# Patient Record
Sex: FEMALE | Race: WHITE | NOT HISPANIC OR LATINO | Employment: OTHER | URBAN - METROPOLITAN AREA
[De-identification: names, ages, dates, MRNs, and addresses within clinical notes are randomized per-mention and may not be internally consistent; named-entity substitution may affect disease eponyms.]

---

## 2023-03-23 RX ORDER — SPIRONOLACTONE 25 MG/1
25 TABLET ORAL DAILY
Status: ON HOLD | COMMUNITY

## 2023-03-23 RX ORDER — SIMVASTATIN 20 MG
20 TABLET ORAL AT BEDTIME
Status: ON HOLD | COMMUNITY

## 2023-03-23 RX ORDER — LATANOPROST 50 UG/ML
1 SOLUTION/ DROPS OPHTHALMIC AT BEDTIME
Status: ON HOLD | COMMUNITY

## 2023-03-28 ENCOUNTER — TRANSFERRED RECORDS (OUTPATIENT)
Dept: HEALTH INFORMATION MANAGEMENT | Facility: CLINIC | Age: 78
End: 2023-03-28
Payer: COMMERCIAL

## 2023-03-31 ENCOUNTER — ANESTHESIA (OUTPATIENT)
Dept: SURGERY | Facility: CLINIC | Age: 78
End: 2023-03-31
Payer: COMMERCIAL

## 2023-03-31 ENCOUNTER — ANESTHESIA EVENT (OUTPATIENT)
Dept: SURGERY | Facility: CLINIC | Age: 78
End: 2023-03-31
Payer: COMMERCIAL

## 2023-03-31 ENCOUNTER — HOSPITAL ENCOUNTER (OUTPATIENT)
Facility: CLINIC | Age: 78
Discharge: HOME | End: 2023-04-01
Attending: OBSTETRICS & GYNECOLOGY | Admitting: OBSTETRICS & GYNECOLOGY
Payer: COMMERCIAL

## 2023-03-31 DIAGNOSIS — Z98.890 POSTOPERATIVE STATE: Primary | ICD-10-CM

## 2023-03-31 LAB
ANION GAP SERPL CALCULATED.3IONS-SCNC: 19 MMOL/L (ref 7–15)
BUN SERPL-MCNC: 15.2 MG/DL (ref 8–23)
CALCIUM SERPL-MCNC: 9.2 MG/DL (ref 8.8–10.2)
CHLORIDE SERPL-SCNC: 97 MMOL/L (ref 98–107)
CREAT SERPL-MCNC: 0.73 MG/DL (ref 0.51–0.95)
DEPRECATED HCO3 PLAS-SCNC: 19 MMOL/L (ref 22–29)
GFR SERPL CREATININE-BSD FRML MDRD: 85 ML/MIN/1.73M2
GLUCOSE SERPL-MCNC: 92 MG/DL (ref 70–99)
HGB BLD-MCNC: 14.6 G/DL (ref 11.7–15.7)
POTASSIUM SERPL-SCNC: 4.5 MMOL/L (ref 3.4–5.3)
SODIUM SERPL-SCNC: 135 MMOL/L (ref 136–145)

## 2023-03-31 PROCEDURE — C1781 MESH (IMPLANTABLE): HCPCS | Performed by: OBSTETRICS & GYNECOLOGY

## 2023-03-31 PROCEDURE — 250N000011 HC RX IP 250 OP 636: Performed by: OBSTETRICS & GYNECOLOGY

## 2023-03-31 PROCEDURE — 258N000003 HC RX IP 258 OP 636: Performed by: NURSE ANESTHETIST, CERTIFIED REGISTERED

## 2023-03-31 PROCEDURE — C1771 REP DEV, URINARY, W/SLING: HCPCS | Performed by: OBSTETRICS & GYNECOLOGY

## 2023-03-31 PROCEDURE — 85018 HEMOGLOBIN: CPT | Performed by: OBSTETRICS & GYNECOLOGY

## 2023-03-31 PROCEDURE — 250N000009 HC RX 250: Performed by: NURSE ANESTHETIST, CERTIFIED REGISTERED

## 2023-03-31 PROCEDURE — 999N000141 HC STATISTIC PRE-PROCEDURE NURSING ASSESSMENT: Performed by: OBSTETRICS & GYNECOLOGY

## 2023-03-31 PROCEDURE — 250N000011 HC RX IP 250 OP 636: Performed by: NURSE ANESTHETIST, CERTIFIED REGISTERED

## 2023-03-31 PROCEDURE — 258N000003 HC RX IP 258 OP 636: Performed by: ANESTHESIOLOGY

## 2023-03-31 PROCEDURE — 250N000025 HC SEVOFLURANE, PER MIN: Performed by: OBSTETRICS & GYNECOLOGY

## 2023-03-31 PROCEDURE — 250N000009 HC RX 250: Performed by: OBSTETRICS & GYNECOLOGY

## 2023-03-31 PROCEDURE — 370N000017 HC ANESTHESIA TECHNICAL FEE, PER MIN: Performed by: OBSTETRICS & GYNECOLOGY

## 2023-03-31 PROCEDURE — 710N000009 HC RECOVERY PHASE 1, LEVEL 1, PER MIN: Performed by: OBSTETRICS & GYNECOLOGY

## 2023-03-31 PROCEDURE — 272N000001 HC OR GENERAL SUPPLY STERILE: Performed by: OBSTETRICS & GYNECOLOGY

## 2023-03-31 PROCEDURE — 80048 BASIC METABOLIC PNL TOTAL CA: CPT | Performed by: ANESTHESIOLOGY

## 2023-03-31 PROCEDURE — 88305 TISSUE EXAM BY PATHOLOGIST: CPT | Mod: TC | Performed by: OBSTETRICS & GYNECOLOGY

## 2023-03-31 PROCEDURE — 258N000003 HC RX IP 258 OP 636: Performed by: OBSTETRICS & GYNECOLOGY

## 2023-03-31 PROCEDURE — 88305 TISSUE EXAM BY PATHOLOGIST: CPT | Mod: 26 | Performed by: STUDENT IN AN ORGANIZED HEALTH CARE EDUCATION/TRAINING PROGRAM

## 2023-03-31 PROCEDURE — 360N000080 HC SURGERY LEVEL 7, PER MIN: Performed by: OBSTETRICS & GYNECOLOGY

## 2023-03-31 PROCEDURE — 250N000013 HC RX MED GY IP 250 OP 250 PS 637: Performed by: OBSTETRICS & GYNECOLOGY

## 2023-03-31 DEVICE — IMPLANTABLE DEVICE: Type: IMPLANTABLE DEVICE | Site: PELVIS | Status: FUNCTIONAL

## 2023-03-31 DEVICE — TRANSVAGINAL MID-URETHRAL SLING
Type: IMPLANTABLE DEVICE | Site: PELVIS | Status: FUNCTIONAL
Brand: ADVANTAGE FIT™  SYSTEM

## 2023-03-31 RX ORDER — POLYETHYLENE GLYCOL 3350 17 G/17G
17 POWDER, FOR SOLUTION ORAL DAILY
Status: DISCONTINUED | OUTPATIENT
Start: 2023-03-31 | End: 2023-04-01 | Stop reason: HOSPADM

## 2023-03-31 RX ORDER — CEFAZOLIN SODIUM/WATER 2 G/20 ML
2 SYRINGE (ML) INTRAVENOUS SEE ADMIN INSTRUCTIONS
Status: DISCONTINUED | OUTPATIENT
Start: 2023-03-31 | End: 2023-03-31 | Stop reason: HOSPADM

## 2023-03-31 RX ORDER — CEFAZOLIN SODIUM/WATER 2 G/20 ML
2 SYRINGE (ML) INTRAVENOUS
Status: COMPLETED | OUTPATIENT
Start: 2023-03-31 | End: 2023-03-31

## 2023-03-31 RX ORDER — HYDROMORPHONE HYDROCHLORIDE 2 MG/1
2 TABLET ORAL EVERY 4 HOURS PRN
Status: DISCONTINUED | OUTPATIENT
Start: 2023-03-31 | End: 2023-04-01 | Stop reason: HOSPADM

## 2023-03-31 RX ORDER — ONDANSETRON 4 MG/1
4 TABLET, ORALLY DISINTEGRATING ORAL EVERY 6 HOURS PRN
Status: DISCONTINUED | OUTPATIENT
Start: 2023-03-31 | End: 2023-04-01 | Stop reason: HOSPADM

## 2023-03-31 RX ORDER — LABETALOL HYDROCHLORIDE 5 MG/ML
10 INJECTION, SOLUTION INTRAVENOUS
Status: DISCONTINUED | OUTPATIENT
Start: 2023-03-31 | End: 2023-03-31 | Stop reason: HOSPADM

## 2023-03-31 RX ORDER — PHENAZOPYRIDINE HYDROCHLORIDE 200 MG/1
200 TABLET, FILM COATED ORAL ONCE
Status: COMPLETED | OUTPATIENT
Start: 2023-03-31 | End: 2023-03-31

## 2023-03-31 RX ORDER — ACETAMINOPHEN 325 MG/1
650 TABLET ORAL EVERY 6 HOURS
Status: DISCONTINUED | OUTPATIENT
Start: 2023-03-31 | End: 2023-04-01 | Stop reason: HOSPADM

## 2023-03-31 RX ORDER — HYDROMORPHONE HCL IN WATER/PF 6 MG/30 ML
0.4 PATIENT CONTROLLED ANALGESIA SYRINGE INTRAVENOUS
Status: DISCONTINUED | OUTPATIENT
Start: 2023-03-31 | End: 2023-04-01 | Stop reason: HOSPADM

## 2023-03-31 RX ORDER — PROCHLORPERAZINE MALEATE 5 MG
5 TABLET ORAL EVERY 6 HOURS PRN
Status: DISCONTINUED | OUTPATIENT
Start: 2023-03-31 | End: 2023-04-01 | Stop reason: HOSPADM

## 2023-03-31 RX ORDER — PROPOFOL 10 MG/ML
INJECTION, EMULSION INTRAVENOUS CONTINUOUS PRN
Status: DISCONTINUED | OUTPATIENT
Start: 2023-03-31 | End: 2023-03-31

## 2023-03-31 RX ORDER — NALOXONE HYDROCHLORIDE 0.4 MG/ML
0.4 INJECTION, SOLUTION INTRAMUSCULAR; INTRAVENOUS; SUBCUTANEOUS
Status: DISCONTINUED | OUTPATIENT
Start: 2023-03-31 | End: 2023-04-01 | Stop reason: HOSPADM

## 2023-03-31 RX ORDER — PROPOFOL 10 MG/ML
INJECTION, EMULSION INTRAVENOUS PRN
Status: DISCONTINUED | OUTPATIENT
Start: 2023-03-31 | End: 2023-03-31

## 2023-03-31 RX ORDER — HYDROMORPHONE HCL IN WATER/PF 6 MG/30 ML
0.4 PATIENT CONTROLLED ANALGESIA SYRINGE INTRAVENOUS EVERY 5 MIN PRN
Status: DISCONTINUED | OUTPATIENT
Start: 2023-03-31 | End: 2023-03-31 | Stop reason: HOSPADM

## 2023-03-31 RX ORDER — HYDROMORPHONE HYDROCHLORIDE 2 MG/1
4 TABLET ORAL EVERY 4 HOURS PRN
Status: DISCONTINUED | OUTPATIENT
Start: 2023-03-31 | End: 2023-04-01 | Stop reason: HOSPADM

## 2023-03-31 RX ORDER — ONDANSETRON 2 MG/ML
4 INJECTION INTRAMUSCULAR; INTRAVENOUS EVERY 6 HOURS PRN
Status: DISCONTINUED | OUTPATIENT
Start: 2023-03-31 | End: 2023-04-01 | Stop reason: HOSPADM

## 2023-03-31 RX ORDER — ONDANSETRON 4 MG/1
4 TABLET, ORALLY DISINTEGRATING ORAL EVERY 30 MIN PRN
Status: DISCONTINUED | OUTPATIENT
Start: 2023-03-31 | End: 2023-03-31 | Stop reason: HOSPADM

## 2023-03-31 RX ORDER — ONDANSETRON 2 MG/ML
INJECTION INTRAMUSCULAR; INTRAVENOUS PRN
Status: DISCONTINUED | OUTPATIENT
Start: 2023-03-31 | End: 2023-03-31

## 2023-03-31 RX ORDER — DEXAMETHASONE SODIUM PHOSPHATE 4 MG/ML
INJECTION, SOLUTION INTRA-ARTICULAR; INTRALESIONAL; INTRAMUSCULAR; INTRAVENOUS; SOFT TISSUE PRN
Status: DISCONTINUED | OUTPATIENT
Start: 2023-03-31 | End: 2023-03-31

## 2023-03-31 RX ORDER — FENTANYL CITRATE 50 UG/ML
50 INJECTION, SOLUTION INTRAMUSCULAR; INTRAVENOUS EVERY 5 MIN PRN
Status: DISCONTINUED | OUTPATIENT
Start: 2023-03-31 | End: 2023-03-31 | Stop reason: HOSPADM

## 2023-03-31 RX ORDER — HYDROMORPHONE HCL IN WATER/PF 6 MG/30 ML
0.2 PATIENT CONTROLLED ANALGESIA SYRINGE INTRAVENOUS EVERY 5 MIN PRN
Status: DISCONTINUED | OUTPATIENT
Start: 2023-03-31 | End: 2023-03-31 | Stop reason: HOSPADM

## 2023-03-31 RX ORDER — SODIUM CHLORIDE, SODIUM LACTATE, POTASSIUM CHLORIDE, CALCIUM CHLORIDE 600; 310; 30; 20 MG/100ML; MG/100ML; MG/100ML; MG/100ML
INJECTION, SOLUTION INTRAVENOUS CONTINUOUS
Status: DISCONTINUED | OUTPATIENT
Start: 2023-03-31 | End: 2023-03-31 | Stop reason: HOSPADM

## 2023-03-31 RX ORDER — KETOROLAC TROMETHAMINE 15 MG/ML
15 INJECTION, SOLUTION INTRAMUSCULAR; INTRAVENOUS EVERY 6 HOURS
Status: COMPLETED | OUTPATIENT
Start: 2023-03-31 | End: 2023-04-01

## 2023-03-31 RX ORDER — HYDROMORPHONE HCL IN WATER/PF 6 MG/30 ML
0.2 PATIENT CONTROLLED ANALGESIA SYRINGE INTRAVENOUS
Status: DISCONTINUED | OUTPATIENT
Start: 2023-03-31 | End: 2023-04-01 | Stop reason: HOSPADM

## 2023-03-31 RX ORDER — NALOXONE HYDROCHLORIDE 0.4 MG/ML
0.2 INJECTION, SOLUTION INTRAMUSCULAR; INTRAVENOUS; SUBCUTANEOUS
Status: DISCONTINUED | OUTPATIENT
Start: 2023-03-31 | End: 2023-04-01 | Stop reason: HOSPADM

## 2023-03-31 RX ORDER — FENTANYL CITRATE 50 UG/ML
INJECTION, SOLUTION INTRAMUSCULAR; INTRAVENOUS PRN
Status: DISCONTINUED | OUTPATIENT
Start: 2023-03-31 | End: 2023-03-31

## 2023-03-31 RX ORDER — FENTANYL CITRATE 50 UG/ML
25 INJECTION, SOLUTION INTRAMUSCULAR; INTRAVENOUS EVERY 5 MIN PRN
Status: DISCONTINUED | OUTPATIENT
Start: 2023-03-31 | End: 2023-03-31 | Stop reason: HOSPADM

## 2023-03-31 RX ORDER — ACETAMINOPHEN 325 MG/1
975 TABLET ORAL ONCE
Status: COMPLETED | OUTPATIENT
Start: 2023-03-31 | End: 2023-03-31

## 2023-03-31 RX ORDER — LIDOCAINE HYDROCHLORIDE 20 MG/ML
INJECTION, SOLUTION INFILTRATION; PERINEURAL PRN
Status: DISCONTINUED | OUTPATIENT
Start: 2023-03-31 | End: 2023-03-31

## 2023-03-31 RX ORDER — LIDOCAINE 40 MG/G
CREAM TOPICAL
Status: DISCONTINUED | OUTPATIENT
Start: 2023-03-31 | End: 2023-03-31 | Stop reason: HOSPADM

## 2023-03-31 RX ORDER — SODIUM CHLORIDE 9 MG/ML
INJECTION, SOLUTION INTRAVENOUS CONTINUOUS
Status: DISCONTINUED | OUTPATIENT
Start: 2023-03-31 | End: 2023-04-01 | Stop reason: HOSPADM

## 2023-03-31 RX ORDER — ONDANSETRON 2 MG/ML
4 INJECTION INTRAMUSCULAR; INTRAVENOUS EVERY 30 MIN PRN
Status: DISCONTINUED | OUTPATIENT
Start: 2023-03-31 | End: 2023-03-31 | Stop reason: HOSPADM

## 2023-03-31 RX ORDER — PROCHLORPERAZINE 25 MG
12.5 SUPPOSITORY, RECTAL RECTAL EVERY 12 HOURS PRN
Status: DISCONTINUED | OUTPATIENT
Start: 2023-03-31 | End: 2023-04-01 | Stop reason: HOSPADM

## 2023-03-31 RX ADMIN — PHENAZOPYRIDINE 200 MG: 200 TABLET ORAL at 10:54

## 2023-03-31 RX ADMIN — SUGAMMADEX 350 MG: 100 INJECTION, SOLUTION INTRAVENOUS at 15:18

## 2023-03-31 RX ADMIN — Medication 2 G: at 11:30

## 2023-03-31 RX ADMIN — ROCURONIUM BROMIDE 10 MG: 50 INJECTION, SOLUTION INTRAVENOUS at 14:04

## 2023-03-31 RX ADMIN — HYDROMORPHONE HYDROCHLORIDE 0.5 MG: 1 INJECTION, SOLUTION INTRAMUSCULAR; INTRAVENOUS; SUBCUTANEOUS at 13:37

## 2023-03-31 RX ADMIN — ACETAMINOPHEN 650 MG: 325 TABLET, FILM COATED ORAL at 18:36

## 2023-03-31 RX ADMIN — KETOROLAC TROMETHAMINE 15 MG: 15 INJECTION, SOLUTION INTRAMUSCULAR; INTRAVENOUS at 18:36

## 2023-03-31 RX ADMIN — PHENYLEPHRINE HYDROCHLORIDE 50 MCG: 10 INJECTION INTRAVENOUS at 12:03

## 2023-03-31 RX ADMIN — HYDROMORPHONE HYDROCHLORIDE 0.5 MG: 1 INJECTION, SOLUTION INTRAMUSCULAR; INTRAVENOUS; SUBCUTANEOUS at 12:04

## 2023-03-31 RX ADMIN — PROPOFOL 50 MCG/KG/MIN: 10 INJECTION, EMULSION INTRAVENOUS at 11:46

## 2023-03-31 RX ADMIN — POLYETHYLENE GLYCOL 3350 17 G: 17 POWDER, FOR SOLUTION ORAL at 18:36

## 2023-03-31 RX ADMIN — PROPOFOL 150 MG: 10 INJECTION, EMULSION INTRAVENOUS at 11:38

## 2023-03-31 RX ADMIN — TAZOBACTAM SODIUM AND PIPERACILLIN SODIUM 3.38 G: 375; 3 INJECTION, SOLUTION INTRAVENOUS at 20:20

## 2023-03-31 RX ADMIN — FENTANYL CITRATE 100 MCG: 50 INJECTION, SOLUTION INTRAMUSCULAR; INTRAVENOUS at 11:38

## 2023-03-31 RX ADMIN — ROCURONIUM BROMIDE 50 MG: 50 INJECTION, SOLUTION INTRAVENOUS at 11:38

## 2023-03-31 RX ADMIN — DEXAMETHASONE SODIUM PHOSPHATE 4 MG: 4 INJECTION, SOLUTION INTRA-ARTICULAR; INTRALESIONAL; INTRAMUSCULAR; INTRAVENOUS; SOFT TISSUE at 11:38

## 2023-03-31 RX ADMIN — SODIUM CHLORIDE, POTASSIUM CHLORIDE, SODIUM LACTATE AND CALCIUM CHLORIDE: 600; 310; 30; 20 INJECTION, SOLUTION INTRAVENOUS at 10:54

## 2023-03-31 RX ADMIN — LIDOCAINE HYDROCHLORIDE 30 MG: 20 INJECTION, SOLUTION INFILTRATION; PERINEURAL at 11:38

## 2023-03-31 RX ADMIN — SODIUM CHLORIDE: 9 INJECTION, SOLUTION INTRAVENOUS at 20:19

## 2023-03-31 RX ADMIN — HYDROMORPHONE HYDROCHLORIDE 0.5 MG: 1 INJECTION, SOLUTION INTRAMUSCULAR; INTRAVENOUS; SUBCUTANEOUS at 11:56

## 2023-03-31 RX ADMIN — HYDROMORPHONE HYDROCHLORIDE 0.5 MG: 1 INJECTION, SOLUTION INTRAMUSCULAR; INTRAVENOUS; SUBCUTANEOUS at 13:27

## 2023-03-31 RX ADMIN — ACETAMINOPHEN 975 MG: 325 TABLET ORAL at 10:54

## 2023-03-31 RX ADMIN — SODIUM CHLORIDE, POTASSIUM CHLORIDE, SODIUM LACTATE AND CALCIUM CHLORIDE: 600; 310; 30; 20 INJECTION, SOLUTION INTRAVENOUS at 12:22

## 2023-03-31 RX ADMIN — ROCURONIUM BROMIDE 10 MG: 50 INJECTION, SOLUTION INTRAVENOUS at 12:37

## 2023-03-31 RX ADMIN — ONDANSETRON 4 MG: 2 INJECTION INTRAMUSCULAR; INTRAVENOUS at 15:24

## 2023-03-31 ASSESSMENT — ACTIVITIES OF DAILY LIVING (ADL)
ADLS_ACUITY_SCORE: 24
ADLS_ACUITY_SCORE: 24
ADLS_ACUITY_SCORE: 20
ADLS_ACUITY_SCORE: 24

## 2023-03-31 ASSESSMENT — ENCOUNTER SYMPTOMS: DYSRHYTHMIAS: 0

## 2023-03-31 NOTE — ANESTHESIA CARE TRANSFER NOTE
Patient: Connie Greenwood    Procedure: Procedure(s):  Xi Robotic Assisted Laparoscopic Sacral Colpopexy, Robotic Laparoscopic Supracervical Hysterectomy with Bilateral Salpingectomy-Oopherectomy, Lysis of Adhesions,  Robotic Laparoscopic Abdominal Enterocele Repair, Cystoscopy, and Midurethral Sling       Diagnosis: Uterovaginal prolapse, incomplete [N81.2]  Enterocele [K46.9]  Female stress incontinence [N39.3]  Diagnosis Additional Information: No value filed.    Anesthesia Type:   General     Note:    Oropharynx: oral airway in place  Level of Consciousness: drowsy and awake  Oxygen Supplementation: face mask  Level of Supplemental Oxygen (L/min / FiO2): 6  Independent Airway: airway patency satisfactory and stable  Dentition: dentition unchanged  Vital Signs Stable: post-procedure vital signs reviewed and stable  Report to RN Given: handoff report given  Patient transferred to: PACU    Handoff Report: Identifed the Patient, Identified the Reponsible Provider, Reviewed the pertinent medical history, Discussed the surgical course, Reviewed Intra-OP anesthesia mangement and issues during anesthesia, Set expectations for post-procedure period and Allowed opportunity for questions and acknowledgement of understanding      Vitals:  Vitals Value Taken Time   /75 03/31/23 1535   Temp 37    Pulse 72 03/31/23 1538   Resp 26 03/31/23 1538   SpO2 100 % 03/31/23 1538   Vitals shown include unvalidated device data.    Electronically Signed By: KATHI Horvath CRNA  March 31, 2023  3:39 PM

## 2023-03-31 NOTE — ANESTHESIA PROCEDURE NOTES
Airway       Patient location during procedure: OR       Procedure Start/Stop Times: 3/31/2023 11:41 AM  Staff -        CRNA: Krish Abdullahi APRN CRNA       Performed By: CRNA  Consent for Airway        Urgency: elective  Indications and Patient Condition       Indications for airway management: sukh-procedural       Induction type:intravenous       Mask difficulty assessment: 1 - vent by mask    Final Airway Details       Final airway type: endotracheal airway       Successful airway: ETT - single and Oral  Endotracheal Airway Details        ETT size (mm): 7.0       Cuffed: yes       Cuff volume (mL): 6       Successful intubation technique: direct laryngoscopy       DL Blade Type: Dias 2       Grade View of Cords: 2       Adjucts: stylet       Position: Right       Measured from: gums/teeth       Secured at (cm): 21       Bite block used: Soft    Post intubation assessment        Placement verified by: capnometry, equal breath sounds and chest rise        Number of attempts at approach: 1       Number of other approaches attempted: 0       Secured with: plastic tape       Ease of procedure: easy       Dentition: Intact and Unchanged    Medication(s) Administered   Medication Administration Time: 3/31/2023 11:41 AM

## 2023-03-31 NOTE — ANESTHESIA PREPROCEDURE EVALUATION
Anesthesia Pre-Procedure Evaluation    Patient: Connie Gerenwood   MRN: [id number] : 1945        Procedure : Procedure(s):  Xi Robotic Assisted Laparoscopic Sacral Colpopexy, Robotic Laparoscopic Supracervical Hysterectomy with Bilateral Salpingectomy, Robotic Laparoscopic Abdominal Enterocele Repair, Cystoscopy, Possible Midurethral Sling and Posterior Repair          Past Medical History:   Diagnosis Date     Hypertension      Sleep apnea     Wears a mouth piece      Past Surgical History:   Procedure Laterality Date     CHOLECYSTECTOMY        No Known Allergies   Social History     Tobacco Use     Smoking status: Never     Smokeless tobacco: Never   Substance Use Topics     Alcohol use: Yes     Comment: occ      Wt Readings from Last 1 Encounters:   23 79.4 kg (175 lb)        Anesthesia Evaluation            ROS/MED HX  ENT/Pulmonary:     (+) sleep apnea (oral appliance), mild,     Neurologic:  - neg neurologic ROS     Cardiovascular:     (+) hypertension----- (-) CAD, CHF, arrhythmias and pulmonary hypertension   METS/Exercise Tolerance:     Hematologic:    (-) anemia   Musculoskeletal:   (+) arthritis,     GI/Hepatic:    (-) GERD and hepatitis   Renal/Genitourinary:  - neg Renal ROS     Endo:  - neg endo ROS     Psychiatric/Substance Use:  - neg psychiatric ROS     Infectious Disease:  - neg infectious disease ROS     Malignancy:       Other:            Physical Exam    Airway        Mallampati: II   TM distance: > 3 FB   Neck ROM: full   Mouth opening: > 3 cm    Respiratory Devices and Support         Dental           Cardiovascular   cardiovascular exam normal          Pulmonary   pulmonary exam normal            Other findings: No lab results found.   No lab results found.    OUTSIDE LABS:  CBC: No results found for: WBC, HGB, HCT, PLT  BMP: No results found for: NA, POTASSIUM, CHLORIDE, CO2, BUN, CR, GLC  COAGS: No results found for: PTT, INR, FIBR  POC: No results found for: BGM, HCG,  HCGS  HEPATIC: No results found for: ALBUMIN, PROTTOTAL, ALT, AST, GGT, ALKPHOS, BILITOTAL, BILIDIRECT, BRIE  OTHER: No results found for: PH, LACT, A1C, ISAURO, PHOS, MAG, LIPASE, AMYLASE, TSH, T4, T3, CRP, SED    Anesthesia Plan    ASA Status:  2      Anesthesia Type: General.     - Airway: ETT   Induction: Propofol.   Maintenance: Balanced.        Consents    Anesthesia Plan(s) and associated risks, benefits, and realistic alternatives discussed. Questions answered and patient/representative(s) expressed understanding.    - Discussed:     - Discussed with:  Patient      - Extended Intubation/Ventilatory Support Discussed: No.      - Patient is DNR/DNI Status: No    Use of blood products discussed: No .     Postoperative Care    Pain management: IV analgesics, Oral pain medications.   PONV prophylaxis: Ondansetron (or other 5HT-3), Background Propofol Infusion     Comments:                Darrion Leyva MD

## 2023-03-31 NOTE — PLAN OF CARE
Goal Outcome Evaluation:    ROOM # 206-1    Living Situation (if not independent, order SW consult):  Facility name: Home  : Not sure but it will be a friend.     Activity level at baseline: Indep  Activity level on admit: Assists X 1    Who will be transporting you at discharge: Family    Patient registered to observation; given Patient Bill of Rights; given the opportunity to ask questions about observation status and their plan of care.  Patient has been oriented to the observation room, bathroom and call light is in place.    Discussed discharge goals and expectations with patient/family.

## 2023-03-31 NOTE — PROVIDER NOTIFICATION
Pt continues to have urge to void.  Bladder scanned for 0ml.  Catheter draining without difficulty.  Text page sent to Dr. Patiño to see if other interventions are possible.  Denies pain.

## 2023-03-31 NOTE — OP NOTE
OPERATIVE REPORT    NAME: Connie Greenwood  MR#: [id number]  : 1945    DATE OF OPERATION: 2023    SURGEON: Sheryl Patiño MD    PREOPERATIVE DIAGNOSES:  1. Complete uterovaginal prolapse.  2. Associated cystocele, rectocele and enterocele  3. Stress urinary incontinence with intrinsic urethral sphincter deficiency    POSTOPERATIVE DIAGNOSES:  1. Complete uterovaginal prolapse.  2. Associated cystocele, rectocele and enterocele  3. Stress urinary incontinence with intrinsic sphincter deficiency  4. Intra-abdominal adhesions of omentum to anterior abdominal wall and sigmoid to the left pelvic side wall and adnexa.    PROCEDURE:  1. Da Jabari laparoscopic sacral colpopexy  2. Da Jabari laparoscopic supracervical hysterectomy  3. Da Jabari laparoscopic bilateral salpingo-Oophorectomy  4. Da Jabari laparoscopic abdominal anterior, posterior and enterocele repairs  5. Retropubic midurethral sling  6. Cystourethroscopy    ASSISTANT:  TOMÁS Domínguez    ANESTHESIA:  General endotracheal.    ESTIMATED BLOOD LOSS:  75 ml    IV FLUIDS:  1800 ml crystalloid    FINDINGS:  1. The bladder was found to be free of lesion. Ureters were in their normal anatomic positions, were noted to be patent via administration of dye.  2. The ovaries were normal appearing, removed per surgical plan  3. Normal anorectal examination at the conclusion of the procedure.    DRAINS:  16-Romanian Roldan catheter to gravity drainage.    PACKING:  Saline-soaked vaginal packing placed.    COMPLICATIONS:  None.    INDICATIONS :  This patient was seen in consultation regarding uterovaginal prolapse and urinary incontinence . Please refer to her clinic documentation for a complete description of her evaluation and treatment plan. She was desirous of a definitive surgical approach. Prior to the procedure the risks, benefits, indications, and alternatives were discussed. Written and verbal consent were obtained.    PROCEDURE IN DETAIL:  The patient  was brought to the operating suite. She was administered prophylactic IV antibiotics, had sequential compression devices present and functioning on her lower extremities.    She was placed in a supine position, administered general endotracheal anesthesia without complication. She was now carefully positioned in the dorsal lithotomy position with her legs carefully stationed in Yellofin stirrups, with attention to avoiding pressure points. An exam under anesthesia was performed with noted relaxation, no adnexal or parametrial masses, normal anorectal exam. She was now sterilely prepped and draped both abdominally and vaginally, and an 16-Slovak Roldan catheter was placed to gravity drainage.    Laparoscopic entry:  At the base of the umbilicus, a skin incision was created. The incision was extended to 25- 30 mm and a dissection was performed down to the peritoneum and entry into the abdominal cavity was achieved. The gelpoint retractor/trocar galicia was inserted. Inspection of the intra-abdominal cavity showed there to be no lesions. She was placed in steep Trendelenburg position and 3 additional laparoscopic ports were placed, 8 mm far right quadrant, 8 mm mid left quadrant, and 8 mm far left quadrant. The da Jabari robotic arms were brought to the patient's bedside and operative control was assumed at the console.    Lysis of adhesions:  In order to visualize and enter the operative space, the Intra-abdominal adhesions of omentum to anterior abdominal wall and sigmoid to the left pelvic side wall and adnexa were taken down sharply.    Bilateral Salpingo-oophorectomy:  The right infundibulo-pelvic ligament was elevated. A peritoneal window was created below the ligament and above the level of the visualized ureter. The right IP ligament was then cauterized. The adnexa was grasped, the peritoneum was cauterized mobilizing and the specimen. This was repeated on the left side in-a similar fashion. The specimens were  left attached to the uterus.ed to the uterus.    Supracervical hysterectomy:  The left round ligament was grasped, cauterized, and incised. The anterior broad ligament was then scored opened. The utero-ovarian pedicle was now cauterized and incised. The broad ligament was further dissected using cautery. The uterine vessels were visualized and cauterized. Anteriorly, a peritoneal bladder flap had been developed transversely and dissected down past the external cervical os. This procedure was then repeated in a similar fashion on the patient's right side. At this point, the specimen was truncated from the residual cervix leaving 1-2 cm of residual cervical tissue. The specimen was tagged for later removal.    Sacral colpopexy:  A Lucite probe was placed within the vaginal canal. Anteriorly, the bladder was dissected down the anterior vaginal muscularis approximately 9 cm sagitally. Posteriorly, the peritoneum was dissected off the posterior rectovaginal septum and dissected down to the rectovaginal septum, approximately 11 cm sagitally, as close to the perineal body as possible.    Anterior and Posterior Repairs:  The posterior perirectal tissue and vaginal muscularis were cinched using several longitudinal placed 2-0 PDS sutures, performing an internal vaginal rectocele repair.  The anterior perivesical tissue and vaginal muscularis were cinched using several longitudinal placed sutures, performing an internal vaginal cystocele repair.    Sacral dissection:  At the sacral promontory the right ureter was noted to be lateral to the area of dissection. The peritoneum was elevated and incised. The peritoneal incision was taken down around the pelvic curvature to meet up with the posterior vaginal dissection. The peritoneal edges were carefully mobilized for future closure. At the sacral promontory the connective tissue was dissected down to the anterior longitudinal ligament. The middle sacral vessel was  cauterized.    Mesh Attachment.  A 5 cm x 15 cm piece of [employer] Polyform polypropylene mesh was attached to the anterior vaginal muscularis using approximately 9 interrupted sutures of 2-0 PDS. An identical sheet of mesh was attached to the posterior vaginal wall using approximately 9 interrupted sutures of 2-0 PDS. The long arms of the mesh were now brought to the sacral promontory and attached to the anterior longitudinal ligament using 3 interrupted sutures of 0 Othello-Tesfaye. Appropriate tensioning was ascertained using visual and palpating clues. Redundant longitudinal mesh was trimmed and the resultant peritoneum was closed with monocryl suture, thus retroperitonealizing the mesh repair.    Abdominal Enterocele Repair  Using a Halban technique, the deep pelvis was closed/obliterated using 2-0 PDS suture, imbricating the peritoneal tissue.    Cystourethroscopy was now performed, which noted patent ureters bilaterally and normal appearing bladder.    Specimen removal:  The uterine/adnexal specimen was now removed from the abdominal cavity through the umbilical incision using an endo-catch bag.    Laparoscopic closure:  The umbilical fascia was closed with 0-vicryl. The CO2 gas was allowed to escape from the patient's abdomen, and the resultant 5 skin incisions were closed with a subcuticular suture of 4-0 vicryl and skin glue was applied.    Retropubic midurethral sling:  Two marks were created on the anterior abdominal wall 2.5 cm lateral to midline at the level of the pubic bone. Attention was turned to the vagina, where an Allis clamp was applied 1 cm distal from the meatus, an additional Allis clamp 2.5 cm from the meatus. Periurethral tissue was injected with a solution of Marcaine with epinephrine. A 1.5 cm incision was created between the 2 Allis clamps beneath the mid urethra in the sagittal plane. Two periurethral tunnels were then created out laterally towards the pubic bone. Once an adequate  dissection had been performed, the Safello Advantage Fit device was selected and loaded. Trocar was brought through the patient's left periurethral tunnel back behind the pubic bone, through the space of Retzius, and out through the previously created peri on the anterior abdominal wall. The blue stay sheath was left in place.    This was repeated in a similar fashion on the patient's right side. The urethra was diverted in ipsilateral fashion during trocar placement. Cystourethroscopy was now performed with the above noted normal findings. The cystoscope was removed. The sling material was appropriately positioned beneath the mid urethra with no overt tension. The resultant incision was closed with a running locking suture of 2-0 Vicryl. Excess sling material on the anterior abdominal wall was trimmed. The resultant incisions were closed with Dermabond.    The vagina was packed with a saline-soaked vaginal packing. She had a 16-Guinean Roldan catheter present to gravity drainage. Sponge, lap, and needle counts were found to be correct. There were no complications from surgery. Patient was awoken from anesthesia, and brought to the recovery room in excellent condition.    Sheryl Patiño MD

## 2023-03-31 NOTE — PHARMACY-ADMISSION MEDICATION HISTORY
Medication reconciliation interview completed by pre-admitting nurse     Reviewed by Rosenda Blancas, RN (Registered Nurse) on 03/23/23 at 1746    Reviewed by pharmacy. No further clarifications needed.       Prior to Admission medications    Medication Sig Last Dose Taking? Auth Provider Long Term End Date   latanoprost (XALATAN) 0.005 % ophthalmic solution Place 1 drop into both eyes At Bedtime Past Week Yes Reported, Patient Yes    simvastatin (ZOCOR) 20 MG tablet Take 20 mg by mouth At Bedtime Past Week Yes Reported, Patient Yes    spironolactone (ALDACTONE) 25 MG tablet Take 25 mg by mouth daily Past Week Yes Reported, Patient Yes

## 2023-03-31 NOTE — ANESTHESIA POSTPROCEDURE EVALUATION
Patient: Connie Greenwood    Procedure: Procedure(s):  Xi Robotic Assisted Laparoscopic Sacral Colpopexy, Robotic Laparoscopic Supracervical Hysterectomy with Bilateral Salpingectomy-Oopherectomy, Lysis of Adhesions,  Robotic Laparoscopic Abdominal Enterocele Repair, Cystoscopy, and Midurethral Sling       Anesthesia Type:  General    Note:  Disposition: Inpatient   Postop Pain Control: Uneventful            Sign Out: Well controlled pain   PONV: No   Neuro/Psych: Uneventful            Sign Out: Acceptable/Baseline neuro status   Airway/Respiratory: Uneventful            Sign Out: Acceptable/Baseline resp. status   CV/Hemodynamics: Uneventful            Sign Out: Acceptable CV status; No obvious hypovolemia; No obvious fluid overload   Other NRE: NONE   DID A NON-ROUTINE EVENT OCCUR? No           Last vitals:  Vitals Value Taken Time   /70 03/31/23 1700   Temp 97.7  F (36.5  C) 03/31/23 1705   Pulse 64 03/31/23 1715   Resp 11 03/31/23 1715   SpO2 100 % 03/31/23 1715   Vitals shown include unvalidated device data.    Electronically Signed By: Darrion Leyva MD  March 31, 2023  5:16 PM

## 2023-04-01 VITALS
RESPIRATION RATE: 18 BRPM | SYSTOLIC BLOOD PRESSURE: 145 MMHG | HEART RATE: 83 BPM | WEIGHT: 176 LBS | DIASTOLIC BLOOD PRESSURE: 94 MMHG | BODY MASS INDEX: 28.28 KG/M2 | TEMPERATURE: 97.8 F | HEIGHT: 66 IN | OXYGEN SATURATION: 94 %

## 2023-04-01 LAB
HGB BLD-MCNC: 12.5 G/DL (ref 11.7–15.7)
HOLD SPECIMEN: NORMAL

## 2023-04-01 PROCEDURE — 250N000011 HC RX IP 250 OP 636: Performed by: OBSTETRICS & GYNECOLOGY

## 2023-04-01 PROCEDURE — 250N000013 HC RX MED GY IP 250 OP 250 PS 637: Performed by: OBSTETRICS & GYNECOLOGY

## 2023-04-01 PROCEDURE — 36415 COLL VENOUS BLD VENIPUNCTURE: CPT | Performed by: OBSTETRICS & GYNECOLOGY

## 2023-04-01 PROCEDURE — 85018 HEMOGLOBIN: CPT | Performed by: OBSTETRICS & GYNECOLOGY

## 2023-04-01 RX ORDER — POLYETHYLENE GLYCOL 3350 17 G/17G
17 POWDER, FOR SOLUTION ORAL DAILY
Qty: 510 G | Refills: 0 | Status: ON HOLD | OUTPATIENT
Start: 2023-04-01 | End: 2024-09-16

## 2023-04-01 RX ORDER — HYDROMORPHONE HYDROCHLORIDE 2 MG/1
2 TABLET ORAL EVERY 4 HOURS
Qty: 20 TABLET | Refills: 0 | Status: SHIPPED | OUTPATIENT
Start: 2023-04-01 | End: 2023-04-04

## 2023-04-01 RX ORDER — CIPROFLOXACIN 250 MG/1
250 TABLET, FILM COATED ORAL 2 TIMES DAILY
Qty: 14 TABLET | Refills: 0 | Status: SHIPPED | OUTPATIENT
Start: 2023-04-01 | End: 2023-04-08

## 2023-04-01 RX ORDER — IBUPROFEN 600 MG/1
600 TABLET, FILM COATED ORAL EVERY 6 HOURS PRN
Qty: 30 TABLET | Refills: 0 | Status: ON HOLD | OUTPATIENT
Start: 2023-04-01 | End: 2024-09-16

## 2023-04-01 RX ADMIN — TAZOBACTAM SODIUM AND PIPERACILLIN SODIUM 3.38 G: 375; 3 INJECTION, SOLUTION INTRAVENOUS at 01:59

## 2023-04-01 RX ADMIN — TAZOBACTAM SODIUM AND PIPERACILLIN SODIUM 3.38 G: 375; 3 INJECTION, SOLUTION INTRAVENOUS at 09:09

## 2023-04-01 RX ADMIN — ACETAMINOPHEN 650 MG: 325 TABLET, FILM COATED ORAL at 00:17

## 2023-04-01 RX ADMIN — POLYETHYLENE GLYCOL 3350 17 G: 17 POWDER, FOR SOLUTION ORAL at 09:09

## 2023-04-01 RX ADMIN — KETOROLAC TROMETHAMINE 15 MG: 15 INJECTION, SOLUTION INTRAMUSCULAR; INTRAVENOUS at 06:22

## 2023-04-01 RX ADMIN — KETOROLAC TROMETHAMINE 15 MG: 15 INJECTION, SOLUTION INTRAMUSCULAR; INTRAVENOUS at 12:10

## 2023-04-01 RX ADMIN — KETOROLAC TROMETHAMINE 15 MG: 15 INJECTION, SOLUTION INTRAMUSCULAR; INTRAVENOUS at 00:18

## 2023-04-01 RX ADMIN — ACETAMINOPHEN 650 MG: 325 TABLET, FILM COATED ORAL at 12:01

## 2023-04-01 RX ADMIN — ACETAMINOPHEN 650 MG: 325 TABLET, FILM COATED ORAL at 06:22

## 2023-04-01 ASSESSMENT — ACTIVITIES OF DAILY LIVING (ADL)
ADLS_ACUITY_SCORE: 26

## 2023-04-01 NOTE — PROGRESS NOTES
"UROGYNECOLOGY    POST-OP DAY #1    S: Doing well   Ambulating: up once  Diet: regular  Flatus: yes  Pain control: adequate  Vaginal Pack: removed now by MD  Roldan catheter: in place    O:  Vitals: /64   Pulse 90   Temp 98.1  F (36.7  C)   Resp 18   Ht 1.676 m (5' 6\")   Wt 79.8 kg (176 lb)   SpO2 94%   BMI 28.41 kg/m    BMI= Body mass index is 28.41 kg/m .      Intake/Output Summary (Last 24 hours) at 4/1/2023 0715  Last data filed at 4/1/2023 0600  Gross per 24 hour   Intake 4128 ml   Output 1800 ml   Net 2328 ml       Exam:  Appears healthy and well, A&O x3  Abdomen is soft, slight bloating, incisions C/D/I, good BS  Ext SCD, no edema  Roldan catheter in place  Vaginal packing removed now by MD  no perineal edema, incisions intact.    Labs:  HGB:  pre-op 14.6   Post-op 12.5    Assessment and Plan:  POD# 1  A) Post-Operative Care:    ambulate     ADAT    continue with pain control strategies.    perform voiding trial when able to ambulate without assistance. ORDER NOW.    I reviewed post-operative instructions and precautions/ written information provided.    Discharge home anticipated THIS AFTERNOON    Follow-up based on findings of voiding trial.    B) Medical:     STABLE    Sheryl Patiño MD    "

## 2023-04-01 NOTE — PLAN OF CARE
PRIMARY DIAGNOSIS: Gynecology Procedure    OUTPATIENT/OBSERVATION GOALS TO BE MET BEFORE DISCHARGE:  1. Stable vital signs Yes  2. Tolerating diet:Yes tolerating yogurt and coffee for breakfast   3. Pain controlled with oral pain medications:  Yes  4. Positive bowel sounds:  Yes  5. Voiding without difficulty:   Roldan present  6. Able to ambulate:  Yes  7. Provider specific discharge goals met:  Yes    Discharge Planner Nurse   Safe discharge environment identified: Yes  Barriers to discharge: Yes       Entered by: ISA FISHER RN 04/01/2023 12:48 PM   Vital signs:  Temp: 98.7  F (37.1  C) Temp src: Oral BP: 135/72 Pulse: 83   Resp: 16 SpO2: 99 % O2 Device: None (Room air) Alert and oriented x3. Appeared to be disoriented to palce at times. Pt thought she is at her home and wanted to go downstairs to get newspaper. Reoriented to place. Pain controlled with scheduled Tylenol and Toradol. Ambulated in a arizmendi stand by assist with a GB. Gait steady. Denies lightheadedness/ dizziness. Roldan catheter and x1 vaginal packing present. Small brownish vaginal drainage noted. Incision sites c.d.i. on regular diet. PIV saline locked. Zosyn Q6 hrs. Will continue to monitor.       Please review provider order for any additional goals.   Nurse to notify provider when observation goals have been met and patient is ready for discharge.

## 2023-04-01 NOTE — PROGRESS NOTES
Voiding trial completed per order. Bladder filled with 170 ml saline water per pt's tolerance. With in ten minutes, pt was able to void 75cc. PVR showed 9cc. Dr. Patiño updated. Okay to discharge pt without Roldan.

## 2023-04-01 NOTE — PLAN OF CARE
PRIMARY DIAGNOSIS: Gyn Procedure  OUTPATIENT/OBSERVATION GOALS TO BE MET BEFORE DISCHARGE:  1. Stable vital signs Yes   2. Tolerating diet: Tolerated crackers and water at bedside  3. Pain controlled with oral pain medications: On scheduled Tylenol and IV Toradol q6h  4. Positive bowel sounds:  Yes  5. Voiding without difficulty: Roldan in place  6. Able to ambulate:  Patient up at bedside with assist of one  7. Provider specific discharge goals met:  No    Discharge Planner Nurse   Safe discharge environment identified: Yes  Barriers to discharge: Yes       Entered by: Ema Beal RN 04/01/2023 4:25 AM     Vitals are Temp: 98.1  F (36.7  C) Temp src: Oral BP: 133/64 Pulse: 90   Resp: 18 SpO2: 94 %.  Patient is Alert and Oriented x4 but forgetful. They are 1 Assist with Gait Belt. Pt is a Regular diet.  They are complaining of 8/10 pain in their abdomen/incision sites.  Tylenol and Toradol given for pain, ice intermittently used.  Patient has Normal Saline 0.9% running at 100 mL per hour. Patient has 6 lap sites WDL. Brownish vaginal drainage. Juanita pad changed. Vaginal packing x1, UTV. Patient denies dizziness, SOB, and nausea. CMS intact. Mild facial swelling remains.             Please review provider order for any additional goals.   Nurse to notify provider when observation goals have been met and patient is ready for discharge.

## 2023-04-01 NOTE — PLAN OF CARE
PRIMARY DIAGNOSIS: Gyn Procedure  OUTPATIENT/OBSERVATION GOALS TO BE MET BEFORE DISCHARGE:  1. Stable vital signs Yes but still requiring oxygen  2. Tolerating diet: has not eaten postop  3. Pain controlled with oral pain medications: On scheduled Tylenol and IV Toradol q6h  4. Positive bowel sounds:  Yes  5. Voiding without difficulty: Roldan in place  6. Able to ambulate:  has not been OOB postop  7. Provider specific discharge goals met:  No    Discharge Planner Nurse   Safe discharge environment identified: Yes  Barriers to discharge: Yes       Entered by: Jordan Leyva RN 03/31/2023 9:51 PM        Please review provider order for any additional goals.   Nurse to notify provider when observation goals have been met and patient is ready for discharge.

## 2023-04-01 NOTE — DISCHARGE INSTRUCTIONS
[facility]  Prolapse/Pelvic Reconstructive Surgery  Instructions for Caring for yourself after Surgery     How do I manage my pain?   Pain and tenderness should lessen each day. To help keep pain under control, use the following guidelines:  Apply ice packs to your perineum (vaginal and rectal area) for the 1st couple of days.  Take 600 milligrams (mg) of ibuprofen (Advil) every 6 hours for the 1st several days.   Use your prescribed narcotic (hydromorphone) for additional pain relief as needed.  Do not drive, drink alcohol or make any major decisions, such as signing important papers or managing legal issues, while taking prescription pain medication.   Take pain medication with food to avoid an upset stomach.    How do I care for my perineum?  Use pads for vaginal discharge after surgery. Discharge is normal and can last several weeks. Discharge may appear bloody, yellow or white.  Do not place anything in your vagina until advised by your doctor.     What about bathing?     Do not take a tub bath, use a hot tub or swim until advised by your doctor. You may take showers.    What about bowel and bladder management?  Keep stools soft and regular. We recommend using the following medicine that loosens stools and increases bowel movements:  MiraLAX-  17 grams or a capful daily following surgery.    When urinating, do not bear down. Relax and allow the bladder muscle to contract. If you are unable to urinate, contact your doctor.  If you go home with a catheter, your doctor may prescribe an antibiotic for you to take before bed to help prevent infection. Follow up in the clinic as instructed to have the catheter removed.    What about activity?  Do not lift more than 10 pounds for 12 weeks after surgery. Avoid heavy pushing or pulling, such as vacuuming or lawn mowing. Your body s tissues need time to heal and regain maximum strength.  Keep Active. Walking is encouraged. Gradually build up how long and far  you walk. Climbing stairs is OK if able.      You may resume driving when you are no longer taking narcotic pain medication and have the strength to use the brake pedal as needed.     When do I call my doctor?  Call Dr. Patiño call [phone] if you have:     (for urgent questions/concerns CELL PHONE [phone])  -Any post-operative questions or concerns   -A fever over 100.4 F (38 C)    -Difficulty emptying your bladder  -Chills       -Worsening pain              -Nausea or vomiting

## 2023-04-01 NOTE — PLAN OF CARE
PRIMARY DIAGNOSIS: Gyn Procedure  OUTPATIENT/OBSERVATION GOALS TO BE MET BEFORE DISCHARGE:  1. Stable vital signs Yes   2. Tolerating diet: has not eaten postop  3. Pain controlled with oral pain medications: On scheduled Tylenol and IV Toradol q6h  4. Positive bowel sounds:  Yes  5. Voiding without difficulty: Roldan in place  6. Able to ambulate:  Patient up at bedside with assist of one  7. Provider specific discharge goals met:  No    Discharge Planner Nurse   Safe discharge environment identified: Yes  Barriers to discharge: Yes       Entered by: Ema Beal RN 04/01/2023 12:31 AM     Vitals are Temp: 98.4  F (36.9  C) Temp src: Oral BP: (!) 141/74 Pulse: 90   Resp: 12 SpO2: 100 %.  Patient is Alert and Oriented x4 but forgetful. They are 1 Assist with Gait Belt. Pt is a Regular diet.  They are complaining of 8/10 pain in their abdomen/incision sites.  Tylenol and Toradol given for pain, ice intermittently used.  Patient has Normal Saline 0.9% running at 100 mL per hour. Patient has 6 lap sites WDL. Brownish vaginal drainage. Juanita pad changed. Vaginal packing x1, UTV. Patient denies dizziness, SOB, and nausea. CMS intact. Mild facial swelling remains.             Please review provider order for any additional goals.   Nurse to notify provider when observation goals have been met and patient is ready for discharge.

## 2023-04-01 NOTE — PLAN OF CARE
PRIMARY DIAGNOSIS: Gynecology Procedure    OUTPATIENT/OBSERVATION GOALS TO BE MET BEFORE DISCHARGE:  1. Stable vital signs Yes  2. Tolerating diet:Yes  3. Pain controlled with oral pain medications:  Yes  4. Positive bowel sounds:  Yes  5. Voiding without difficulty:   Roldan present   6. Able to ambulate:  No  7. Provider specific discharge goals met:  No    Discharge Planner Nurse   Safe discharge environment identified: Yes  Barriers to discharge: Yes       Entered by: ISA FISHER RN 04/01/2023    Vital signs:  Temp: 98.7  F (37.1  C) Temp src: Oral BP: 135/72 Pulse: 83   Resp: 16 SpO2: 99 % O2 Device: None (Room air) Alert and oriented x4. Denies pain. Hasn't been out of bed yet. Will ambulate after breakfast. Advanced to regular diet. IVF saline locked. Zosyn Q6 hrs. Roldan catheter present and draining without an issue. Vaginal packing x1 present. Lap sites x6 c.d.I. will continue to monitor.       Please review provider order for any additional goals.   Nurse to notify provider when observation goals have been met and patient is ready for discharge.

## 2023-04-01 NOTE — PLAN OF CARE
Patient's After Visit Summary was reviewed with patient and Friend.   Patient verbalized understanding of After Visit Summary, recommended follow up and was given an opportunity to ask questions.   Discharge medications sent home with patient/family: No   Discharged with other: friend  VSS. Scripts for Cipro, Dilaudid, Ibuprofen, and Miralax given. WC ride provided.

## 2023-04-03 LAB
PATH REPORT.COMMENTS IMP SPEC: NORMAL
PATH REPORT.COMMENTS IMP SPEC: NORMAL
PATH REPORT.FINAL DX SPEC: NORMAL
PATH REPORT.GROSS SPEC: NORMAL
PATH REPORT.MICROSCOPIC SPEC OTHER STN: NORMAL
PATH REPORT.RELEVANT HX SPEC: NORMAL
PHOTO IMAGE: NORMAL

## 2023-05-21 ENCOUNTER — HEALTH MAINTENANCE LETTER (OUTPATIENT)
Age: 78
End: 2023-05-21

## 2024-07-28 ENCOUNTER — HEALTH MAINTENANCE LETTER (OUTPATIENT)
Age: 79
End: 2024-07-28

## 2024-09-15 ENCOUNTER — APPOINTMENT (OUTPATIENT)
Dept: CT IMAGING | Facility: CLINIC | Age: 79
DRG: 641 | End: 2024-09-15
Attending: EMERGENCY MEDICINE
Payer: COMMERCIAL

## 2024-09-15 ENCOUNTER — HOSPITAL ENCOUNTER (INPATIENT)
Facility: CLINIC | Age: 79
LOS: 1 days | Discharge: HOME | DRG: 641 | End: 2024-09-17
Attending: EMERGENCY MEDICINE | Admitting: HOSPITALIST
Payer: COMMERCIAL

## 2024-09-15 ENCOUNTER — APPOINTMENT (OUTPATIENT)
Dept: GENERAL RADIOLOGY | Facility: CLINIC | Age: 79
DRG: 641 | End: 2024-09-15
Attending: EMERGENCY MEDICINE
Payer: COMMERCIAL

## 2024-09-15 DIAGNOSIS — S09.90XA CLOSED HEAD INJURY, INITIAL ENCOUNTER: ICD-10-CM

## 2024-09-15 DIAGNOSIS — F03.90 DEMENTIA WITHOUT BEHAVIORAL DISTURBANCE, PSYCHOTIC DISTURBANCE, MOOD DISTURBANCE, OR ANXIETY, UNSPECIFIED DEMENTIA SEVERITY, UNSPECIFIED DEMENTIA TYPE (H): Primary | ICD-10-CM

## 2024-09-15 DIAGNOSIS — S01.81XA LACERATION OF FOREHEAD, INITIAL ENCOUNTER: ICD-10-CM

## 2024-09-15 DIAGNOSIS — S52.502A: ICD-10-CM

## 2024-09-15 DIAGNOSIS — S62.102A WRIST FRACTURE, LEFT, CLOSED, INITIAL ENCOUNTER: ICD-10-CM

## 2024-09-15 DIAGNOSIS — S52.602A: ICD-10-CM

## 2024-09-15 DIAGNOSIS — R55 SYNCOPE AND COLLAPSE: ICD-10-CM

## 2024-09-15 LAB
ALBUMIN SERPL BCG-MCNC: 3.8 G/DL (ref 3.5–5.2)
ALP SERPL-CCNC: 61 U/L (ref 40–150)
ALT SERPL W P-5'-P-CCNC: 16 U/L (ref 0–50)
ANION GAP SERPL CALCULATED.3IONS-SCNC: 12 MMOL/L (ref 7–15)
AST SERPL W P-5'-P-CCNC: 27 U/L (ref 0–45)
BASOPHILS # BLD AUTO: 0 10E3/UL (ref 0–0.2)
BASOPHILS NFR BLD AUTO: 0 %
BILIRUB SERPL-MCNC: 0.4 MG/DL
BUN SERPL-MCNC: 17.8 MG/DL (ref 8–23)
CALCIUM SERPL-MCNC: 8.8 MG/DL (ref 8.8–10.4)
CHLORIDE SERPL-SCNC: 105 MMOL/L (ref 98–107)
CREAT SERPL-MCNC: 0.81 MG/DL (ref 0.51–0.95)
EGFRCR SERPLBLD CKD-EPI 2021: 74 ML/MIN/1.73M2
EOSINOPHIL # BLD AUTO: 0.1 10E3/UL (ref 0–0.7)
EOSINOPHIL NFR BLD AUTO: 1 %
ERYTHROCYTE [DISTWIDTH] IN BLOOD BY AUTOMATED COUNT: 12.7 % (ref 10–15)
GLUCOSE SERPL-MCNC: 94 MG/DL (ref 70–99)
HCO3 SERPL-SCNC: 25 MMOL/L (ref 22–29)
HCT VFR BLD AUTO: 37.6 % (ref 35–47)
HGB BLD-MCNC: 12.6 G/DL (ref 11.7–15.7)
HOLD SPECIMEN: NORMAL
HOLD SPECIMEN: NORMAL
IMM GRANULOCYTES # BLD: 0 10E3/UL
IMM GRANULOCYTES NFR BLD: 0 %
LYMPHOCYTES # BLD AUTO: 2.3 10E3/UL (ref 0.8–5.3)
LYMPHOCYTES NFR BLD AUTO: 42 %
MCH RBC QN AUTO: 31.7 PG (ref 26.5–33)
MCHC RBC AUTO-ENTMCNC: 33.5 G/DL (ref 31.5–36.5)
MCV RBC AUTO: 95 FL (ref 78–100)
MONOCYTES # BLD AUTO: 0.6 10E3/UL (ref 0–1.3)
MONOCYTES NFR BLD AUTO: 10 %
NEUTROPHILS # BLD AUTO: 2.5 10E3/UL (ref 1.6–8.3)
NEUTROPHILS NFR BLD AUTO: 46 %
NRBC # BLD AUTO: 0 10E3/UL
NRBC BLD AUTO-RTO: 0 /100
PLATELET # BLD AUTO: 183 10E3/UL (ref 150–450)
POTASSIUM SERPL-SCNC: 3.9 MMOL/L (ref 3.4–5.3)
PROT SERPL-MCNC: 6.5 G/DL (ref 6.4–8.3)
RBC # BLD AUTO: 3.97 10E6/UL (ref 3.8–5.2)
SODIUM SERPL-SCNC: 142 MMOL/L (ref 135–145)
TROPONIN T SERPL HS-MCNC: 11 NG/L
TROPONIN T SERPL HS-MCNC: 9 NG/L
WBC # BLD AUTO: 5.4 10E3/UL (ref 4–11)

## 2024-09-15 PROCEDURE — 99222 1ST HOSP IP/OBS MODERATE 55: CPT | Performed by: HOSPITALIST

## 2024-09-15 PROCEDURE — 29125 APPL SHORT ARM SPLINT STATIC: CPT | Mod: LT

## 2024-09-15 PROCEDURE — 84484 ASSAY OF TROPONIN QUANT: CPT | Performed by: EMERGENCY MEDICINE

## 2024-09-15 PROCEDURE — G0378 HOSPITAL OBSERVATION PER HR: HCPCS

## 2024-09-15 PROCEDURE — 0HQ0XZZ REPAIR SCALP SKIN, EXTERNAL APPROACH: ICD-10-PCS | Performed by: EMERGENCY MEDICINE

## 2024-09-15 PROCEDURE — 90471 IMMUNIZATION ADMIN: CPT | Performed by: EMERGENCY MEDICINE

## 2024-09-15 PROCEDURE — 90715 TDAP VACCINE 7 YRS/> IM: CPT | Performed by: EMERGENCY MEDICINE

## 2024-09-15 PROCEDURE — 73110 X-RAY EXAM OF WRIST: CPT | Mod: LT

## 2024-09-15 PROCEDURE — 70450 CT HEAD/BRAIN W/O DYE: CPT

## 2024-09-15 PROCEDURE — 2W3DX1Z IMMOBILIZATION OF LEFT LOWER ARM USING SPLINT: ICD-10-PCS | Performed by: EMERGENCY MEDICINE

## 2024-09-15 PROCEDURE — 250N000013 HC RX MED GY IP 250 OP 250 PS 637: Performed by: EMERGENCY MEDICINE

## 2024-09-15 PROCEDURE — 36415 COLL VENOUS BLD VENIPUNCTURE: CPT | Performed by: EMERGENCY MEDICINE

## 2024-09-15 PROCEDURE — 99285 EMERGENCY DEPT VISIT HI MDM: CPT | Mod: 25

## 2024-09-15 PROCEDURE — 93005 ELECTROCARDIOGRAM TRACING: CPT

## 2024-09-15 PROCEDURE — 85004 AUTOMATED DIFF WBC COUNT: CPT | Performed by: EMERGENCY MEDICINE

## 2024-09-15 PROCEDURE — 12001 RPR S/N/AX/GEN/TRNK 2.5CM/<: CPT

## 2024-09-15 PROCEDURE — 250N000011 HC RX IP 250 OP 636: Performed by: EMERGENCY MEDICINE

## 2024-09-15 PROCEDURE — 80053 COMPREHEN METABOLIC PANEL: CPT | Performed by: EMERGENCY MEDICINE

## 2024-09-15 RX ORDER — ACETAMINOPHEN 500 MG
1000 TABLET ORAL EVERY 6 HOURS PRN
Qty: 60 TABLET | Refills: 0 | Status: SHIPPED | OUTPATIENT
Start: 2024-09-15 | End: 2024-09-17

## 2024-09-15 RX ORDER — OXYCODONE HYDROCHLORIDE 5 MG/1
5 TABLET ORAL EVERY 6 HOURS PRN
Qty: 12 TABLET | Refills: 0 | Status: SHIPPED | OUTPATIENT
Start: 2024-09-15 | End: 2024-09-17

## 2024-09-15 RX ORDER — ACETAMINOPHEN 500 MG
1000 TABLET ORAL ONCE
Status: COMPLETED | OUTPATIENT
Start: 2024-09-15 | End: 2024-09-15

## 2024-09-15 RX ORDER — OXYCODONE HYDROCHLORIDE 5 MG/1
5 TABLET ORAL ONCE
Status: COMPLETED | OUTPATIENT
Start: 2024-09-15 | End: 2024-09-15

## 2024-09-15 RX ADMIN — ACETAMINOPHEN 1000 MG: 500 TABLET ORAL at 21:05

## 2024-09-15 RX ADMIN — OXYCODONE HYDROCHLORIDE 5 MG: 5 TABLET ORAL at 21:21

## 2024-09-15 RX ADMIN — CLOSTRIDIUM TETANI TOXOID ANTIGEN (FORMALDEHYDE INACTIVATED), CORYNEBACTERIUM DIPHTHERIAE TOXOID ANTIGEN (FORMALDEHYDE INACTIVATED), BORDETELLA PERTUSSIS TOXOID ANTIGEN (GLUTARALDEHYDE INACTIVATED), BORDETELLA PERTUSSIS FILAMENTOUS HEMAGGLUTININ ANTIGEN (FORMALDEHYDE INACTIVATED), BORDETELLA PERTUSSIS PERTACTIN ANTIGEN, AND BORDETELLA PERTUSSIS FIMBRIAE 2/3 ANTIGEN 0.5 ML: 5; 2; 2.5; 5; 3; 5 INJECTION, SUSPENSION INTRAMUSCULAR at 21:06

## 2024-09-15 ASSESSMENT — ACTIVITIES OF DAILY LIVING (ADL)
ADLS_ACUITY_SCORE: 38
ADLS_ACUITY_SCORE: 39

## 2024-09-15 ASSESSMENT — COLUMBIA-SUICIDE SEVERITY RATING SCALE - C-SSRS
1. IN THE PAST MONTH, HAVE YOU WISHED YOU WERE DEAD OR WISHED YOU COULD GO TO SLEEP AND NOT WAKE UP?: NO
2. HAVE YOU ACTUALLY HAD ANY THOUGHTS OF KILLING YOURSELF IN THE PAST MONTH?: NO
6. HAVE YOU EVER DONE ANYTHING, STARTED TO DO ANYTHING, OR PREPARED TO DO ANYTHING TO END YOUR LIFE?: NO

## 2024-09-15 NOTE — ED NOTES
Bed: ED08  Expected date:   Expected time:   Means of arrival:   Comments:  542  79 F hit head/+ loc/head lac/no thinners/vss/fentanyl for pain  1850

## 2024-09-16 ENCOUNTER — APPOINTMENT (OUTPATIENT)
Dept: GENERAL RADIOLOGY | Facility: CLINIC | Age: 79
DRG: 641 | End: 2024-09-16
Attending: HOSPITALIST
Payer: COMMERCIAL

## 2024-09-16 ENCOUNTER — APPOINTMENT (OUTPATIENT)
Dept: GENERAL RADIOLOGY | Facility: CLINIC | Age: 79
DRG: 641 | End: 2024-09-16
Attending: PHYSICIAN ASSISTANT
Payer: COMMERCIAL

## 2024-09-16 PROBLEM — S22.49XA RIB FRACTURES: Status: ACTIVE | Noted: 2024-09-16

## 2024-09-16 PROBLEM — R41.0 CONFUSION: Status: ACTIVE | Noted: 2024-09-16

## 2024-09-16 LAB
ANION GAP SERPL CALCULATED.3IONS-SCNC: 10 MMOL/L (ref 7–15)
ATRIAL RATE - MUSE: 66 BPM
BUN SERPL-MCNC: 15.3 MG/DL (ref 8–23)
CALCIUM SERPL-MCNC: 8.7 MG/DL (ref 8.8–10.4)
CHLORIDE SERPL-SCNC: 106 MMOL/L (ref 98–107)
CREAT SERPL-MCNC: 0.69 MG/DL (ref 0.51–0.95)
DIASTOLIC BLOOD PRESSURE - MUSE: NORMAL MMHG
EGFRCR SERPLBLD CKD-EPI 2021: 88 ML/MIN/1.73M2
GLUCOSE SERPL-MCNC: 113 MG/DL (ref 70–99)
HCO3 SERPL-SCNC: 23 MMOL/L (ref 22–29)
INTERPRETATION ECG - MUSE: NORMAL
P AXIS - MUSE: 56 DEGREES
POTASSIUM SERPL-SCNC: 4 MMOL/L (ref 3.4–5.3)
PR INTERVAL - MUSE: 150 MS
QRS DURATION - MUSE: 82 MS
QT - MUSE: 418 MS
QTC - MUSE: 438 MS
R AXIS - MUSE: 20 DEGREES
SODIUM SERPL-SCNC: 139 MMOL/L (ref 135–145)
SYSTOLIC BLOOD PRESSURE - MUSE: NORMAL MMHG
T AXIS - MUSE: 31 DEGREES
VENTRICULAR RATE- MUSE: 66 BPM

## 2024-09-16 PROCEDURE — 250N000013 HC RX MED GY IP 250 OP 250 PS 637: Performed by: STUDENT IN AN ORGANIZED HEALTH CARE EDUCATION/TRAINING PROGRAM

## 2024-09-16 PROCEDURE — 258N000003 HC RX IP 258 OP 636: Performed by: EMERGENCY MEDICINE

## 2024-09-16 PROCEDURE — 99221 1ST HOSP IP/OBS SF/LOW 40: CPT | Performed by: SURGERY

## 2024-09-16 PROCEDURE — 99232 SBSQ HOSP IP/OBS MODERATE 35: CPT | Performed by: STUDENT IN AN ORGANIZED HEALTH CARE EDUCATION/TRAINING PROGRAM

## 2024-09-16 PROCEDURE — 36415 COLL VENOUS BLD VENIPUNCTURE: CPT | Performed by: STUDENT IN AN ORGANIZED HEALTH CARE EDUCATION/TRAINING PROGRAM

## 2024-09-16 PROCEDURE — G0378 HOSPITAL OBSERVATION PER HR: HCPCS

## 2024-09-16 PROCEDURE — 94150 VITAL CAPACITY TEST: CPT

## 2024-09-16 PROCEDURE — 250N000013 HC RX MED GY IP 250 OP 250 PS 637: Performed by: HOSPITALIST

## 2024-09-16 PROCEDURE — 80048 BASIC METABOLIC PNL TOTAL CA: CPT | Performed by: STUDENT IN AN ORGANIZED HEALTH CARE EDUCATION/TRAINING PROGRAM

## 2024-09-16 PROCEDURE — 258N000003 HC RX IP 258 OP 636: Performed by: HOSPITALIST

## 2024-09-16 PROCEDURE — 120N000001 HC R&B MED SURG/OB

## 2024-09-16 PROCEDURE — 71101 X-RAY EXAM UNILAT RIBS/CHEST: CPT | Mod: LT

## 2024-09-16 PROCEDURE — 999N000065 XR WRIST LEFT G/E 3 VIEWS: Mod: LT

## 2024-09-16 RX ORDER — OXYCODONE HYDROCHLORIDE 5 MG/1
10 TABLET ORAL EVERY 4 HOURS PRN
Status: DISCONTINUED | OUTPATIENT
Start: 2024-09-16 | End: 2024-09-17

## 2024-09-16 RX ORDER — ACETAMINOPHEN 650 MG/1
650 SUPPOSITORY RECTAL EVERY 4 HOURS PRN
Status: DISCONTINUED | OUTPATIENT
Start: 2024-09-16 | End: 2024-09-16

## 2024-09-16 RX ORDER — METHOCARBAMOL 500 MG/1
500 TABLET, FILM COATED ORAL EVERY 6 HOURS PRN
Status: DISCONTINUED | OUTPATIENT
Start: 2024-09-16 | End: 2024-09-17

## 2024-09-16 RX ORDER — SODIUM CHLORIDE, SODIUM LACTATE, POTASSIUM CHLORIDE, CALCIUM CHLORIDE 600; 310; 30; 20 MG/100ML; MG/100ML; MG/100ML; MG/100ML
INJECTION, SOLUTION INTRAVENOUS CONTINUOUS
Status: DISCONTINUED | OUTPATIENT
Start: 2024-09-16 | End: 2024-09-17 | Stop reason: HOSPADM

## 2024-09-16 RX ORDER — GABAPENTIN 100 MG/1
100 CAPSULE ORAL 3 TIMES DAILY
Status: DISCONTINUED | OUTPATIENT
Start: 2024-09-16 | End: 2024-09-16

## 2024-09-16 RX ORDER — AMOXICILLIN 250 MG
2 CAPSULE ORAL 2 TIMES DAILY PRN
Status: DISCONTINUED | OUTPATIENT
Start: 2024-09-16 | End: 2024-09-17 | Stop reason: HOSPADM

## 2024-09-16 RX ORDER — HYDROMORPHONE HCL IN WATER/PF 6 MG/30 ML
0.4 PATIENT CONTROLLED ANALGESIA SYRINGE INTRAVENOUS
Status: DISCONTINUED | OUTPATIENT
Start: 2024-09-16 | End: 2024-09-16

## 2024-09-16 RX ORDER — NALOXONE HYDROCHLORIDE 0.4 MG/ML
0.4 INJECTION, SOLUTION INTRAMUSCULAR; INTRAVENOUS; SUBCUTANEOUS
Status: DISCONTINUED | OUTPATIENT
Start: 2024-09-16 | End: 2024-09-17 | Stop reason: HOSPADM

## 2024-09-16 RX ORDER — DONEPEZIL HYDROCHLORIDE 5 MG/1
5 TABLET, FILM COATED ORAL DAILY
Status: DISCONTINUED | OUTPATIENT
Start: 2024-09-17 | End: 2024-09-17 | Stop reason: HOSPADM

## 2024-09-16 RX ORDER — AMOXICILLIN 250 MG
1 CAPSULE ORAL 2 TIMES DAILY PRN
Status: DISCONTINUED | OUTPATIENT
Start: 2024-09-16 | End: 2024-09-17 | Stop reason: HOSPADM

## 2024-09-16 RX ORDER — ONDANSETRON 4 MG/1
4 TABLET, ORALLY DISINTEGRATING ORAL EVERY 6 HOURS PRN
Status: DISCONTINUED | OUTPATIENT
Start: 2024-09-16 | End: 2024-09-17 | Stop reason: HOSPADM

## 2024-09-16 RX ORDER — IBUPROFEN 200 MG
200 TABLET ORAL DAILY PRN
Status: ON HOLD | COMMUNITY

## 2024-09-16 RX ORDER — ACETAMINOPHEN 325 MG/1
975 TABLET ORAL EVERY 8 HOURS
Status: DISCONTINUED | OUTPATIENT
Start: 2024-09-16 | End: 2024-09-17 | Stop reason: HOSPADM

## 2024-09-16 RX ORDER — ONDANSETRON 2 MG/ML
4 INJECTION INTRAMUSCULAR; INTRAVENOUS EVERY 6 HOURS PRN
Status: DISCONTINUED | OUTPATIENT
Start: 2024-09-16 | End: 2024-09-17 | Stop reason: HOSPADM

## 2024-09-16 RX ORDER — NALOXONE HYDROCHLORIDE 0.4 MG/ML
0.2 INJECTION, SOLUTION INTRAMUSCULAR; INTRAVENOUS; SUBCUTANEOUS
Status: DISCONTINUED | OUTPATIENT
Start: 2024-09-16 | End: 2024-09-17 | Stop reason: HOSPADM

## 2024-09-16 RX ORDER — LIDOCAINE 4 G/G
1 PATCH TOPICAL
Status: DISCONTINUED | OUTPATIENT
Start: 2024-09-16 | End: 2024-09-17 | Stop reason: HOSPADM

## 2024-09-16 RX ORDER — ACETAMINOPHEN 325 MG/1
650 TABLET ORAL EVERY 4 HOURS PRN
Status: DISCONTINUED | OUTPATIENT
Start: 2024-09-16 | End: 2024-09-16

## 2024-09-16 RX ORDER — POLYETHYLENE GLYCOL 3350 17 G/17G
17 POWDER, FOR SOLUTION ORAL DAILY PRN
Status: ON HOLD | COMMUNITY

## 2024-09-16 RX ORDER — OXYCODONE HYDROCHLORIDE 5 MG/1
5 TABLET ORAL EVERY 4 HOURS PRN
Status: DISCONTINUED | OUTPATIENT
Start: 2024-09-16 | End: 2024-09-17

## 2024-09-16 RX ADMIN — SODIUM CHLORIDE, POTASSIUM CHLORIDE, SODIUM LACTATE AND CALCIUM CHLORIDE: 600; 310; 30; 20 INJECTION, SOLUTION INTRAVENOUS at 01:55

## 2024-09-16 RX ADMIN — ACETAMINOPHEN 650 MG: 325 TABLET ORAL at 12:39

## 2024-09-16 RX ADMIN — ACETAMINOPHEN 975 MG: 325 TABLET ORAL at 16:46

## 2024-09-16 RX ADMIN — LIDOCAINE 1 PATCH: 4 PATCH TOPICAL at 20:36

## 2024-09-16 RX ADMIN — ACETAMINOPHEN 975 MG: 325 TABLET ORAL at 22:35

## 2024-09-16 RX ADMIN — SODIUM CHLORIDE 1000 ML: 9 INJECTION, SOLUTION INTRAVENOUS at 00:36

## 2024-09-16 RX ADMIN — HYDROMORPHONE HYDROCHLORIDE 1 MG: 2 TABLET ORAL at 12:39

## 2024-09-16 RX ADMIN — GABAPENTIN 100 MG: 100 CAPSULE ORAL at 16:46

## 2024-09-16 ASSESSMENT — ACTIVITIES OF DAILY LIVING (ADL)
ADLS_ACUITY_SCORE: 39
ADLS_ACUITY_SCORE: 24
ADLS_ACUITY_SCORE: 39
ADLS_ACUITY_SCORE: 24

## 2024-09-16 NOTE — PHARMACY-ADMISSION MEDICATION HISTORY
Pharmacist Admission Medication History    Admission medication history is complete. The information provided in this note is only as accurate as the sources available at the time of the update.    Information Source(s): Patient and CareEverywhere/SureScripts via in-person      Changes made to PTA medication list:  Added: None  Deleted: None  Changed: Ibuprofen lowered to 200mg daily as needed and Miralax updated to as needed.     Allergies reviewed with patient and updates made in EHR: yes    Medication History Completed By: Arlette Georges, PharmD 9/16/2024 8:51 AM    PTA Med List   Medication Sig Last Dose    acetaminophen (TYLENOL) 500 MG tablet Take 2 tablets (1,000 mg) by mouth every 6 hours as needed for pain or fever.     ibuprofen (ADVIL/MOTRIN) 200 MG tablet Take 200 mg by mouth daily as needed for pain. 9/15/2024    latanoprost (XALATAN) 0.005 % ophthalmic solution Place 1 drop into both eyes At Bedtime Past Week    oxyCODONE (ROXICODONE) 5 MG tablet Take 1 tablet (5 mg) by mouth every 6 hours as needed for moderate to severe pain.     polyethylene glycol (MIRALAX) 17 GM/Dose powder Take 17 g by mouth daily as needed for constipation. prn med    simvastatin (ZOCOR) 20 MG tablet Take 20 mg by mouth At Bedtime Past Week    spironolactone (ALDACTONE) 25 MG tablet Take 25 mg by mouth daily Past Week

## 2024-09-16 NOTE — PROGRESS NOTES
Diagnosis: Fall, LOC, Left wrist fx   POD#: NA  Mental Status: A&Ox4  Activity/dangle SBA  Diet: reg  Pain: denies   Roldan/Voiding: BR  Tele/Restraints/Iso: tele NSR  02/LDA: RA, PIV infusing   D/C Date: TBD  Other Info: scattered bruising, left wrist splinted, 3 sutures to left side of head

## 2024-09-16 NOTE — ED NOTES
Observation Brochure and Video    Patient informed of observation status based on provider's order.  Observation brochure was given and the video watched. Patient/Family stated understanding. Questions answered.  Nereida Dias RN

## 2024-09-16 NOTE — PLAN OF CARE
Goal Outcome Evaluation:      Plan of Care Reviewed With: patient    Overall Patient Progress: no changeOverall Patient Progress: no change     A&O x 2-3, forgetful. VSS, RA. CMS intact. LUE splint CDI. Pain managed with po dilaudid. SBA/Independent in room. Pending UA sample.

## 2024-09-16 NOTE — ED NOTES
Patient was about to go ome when she felt dizzy and almost fell again, VS were taken and patient BP was low.  She was reassessed by MD and she decided that she would stay the night
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
## 2024-09-16 NOTE — CONSULTS
General Surgery Trauma Consultation/H&P    Connie Greenwood MRN#: [id number]   Age: 79 year old YOB: 1945     Date of Admission:          9/15/2024  Reason for consult/H&P: Fall with injuries   Surgeon:      Chaz Gastelum MD                  Chief Complaint:   Left wrist pain, left chest pain         History of Present Illness:   This patient is a 79 year old  female who presented to the [facility] ER with with injuries after a fall outside. She does not remember all of the details. Her left temple, wrist, and chest hurt. She is able to stand. . Denies fever, chills, nausea, vomiting, change in BM or urination.    History is obtained from the patient and chart.         Past Medical History:    has a past medical history of Hypertension and Sleep apnea.          Past Surgical History:     Past Surgical History:   Procedure Laterality Date    CHOLECYSTECTOMY      DAVINCI PELVIC PROCEDURE N/A 3/31/2023    Procedure: Xi Robotic Assisted Laparoscopic Sacral Colpopexy, Robotic Laparoscopic Supracervical Hysterectomy with Bilateral Salpingectomy-Oopherectomy, Lysis of Adhesions,  Robotic Laparoscopic Abdominal Enterocele Repair, Cystoscopy, and Midurethral Sling;  Surgeon: Sheryl Patiño MD;  Location:  OR            Medications:     Prior to Admission medications    Medication Sig Start Date End Date Taking? Authorizing Provider   acetaminophen (TYLENOL) 500 MG tablet Take 2 tablets (1,000 mg) by mouth every 6 hours as needed for pain or fever. 9/15/24 9/23/24 Yes Francisco Blue MD   ibuprofen (ADVIL/MOTRIN) 200 MG tablet Take 200 mg by mouth daily as needed for pain.   Yes Unknown, Entered By History   latanoprost (XALATAN) 0.005 % ophthalmic solution Place 1 drop into both eyes At Bedtime   Yes Reported, Patient   oxyCODONE (ROXICODONE) 5 MG tablet Take 1 tablet (5 mg) by mouth every 6 hours as needed for moderate to severe pain. 9/15/24 9/18/24 Yes Francisco Blue MD   polyethylene  glycol (MIRALAX) 17 GM/Dose powder Take 17 g by mouth daily as needed for constipation.   Yes Unknown, Entered By History   simvastatin (ZOCOR) 20 MG tablet Take 20 mg by mouth At Bedtime   Yes Reported, Patient   spironolactone (ALDACTONE) 25 MG tablet Take 25 mg by mouth daily   Yes Reported, Patient            Allergies:   No Known Allergies         Social History:     Social History     Tobacco Use    Smoking status: Never    Smokeless tobacco: Never   Substance Use Topics    Alcohol use: Yes     Comment: occ             Family History:    This patient has no significant relevant family history.  Family history is reviewed in detail.          Review of Systems:   Complete ROS is negative other than noted in the HPI.  C: NEGATIVE for fever, chills, change in weight  R: NEGATIVE for significant cough or SOB  CV: NEGATIVE for palpitations or peripheral edema  GI:  NEGATIVE for dysuria, heartburn, or change in bowel habits  H: NEGATIVE for bleeding problems         Physical Exam:   Blood pressure 115/71, pulse 66, temperature 97.3  F (36.3  C), temperature source Oral, resp. rate 16, SpO2 100%.  No intake/output data recorded.    General - This is a well developed, well nourished female .  Facial bones: no clinical fracture.   Scalp: repaired laceration left temple. Several scrapes. No other deep wounds.   HEENT - Normocephalic. Moist mucous membranes. Pupils equal.  No scleral icterus. Nose normal.  Neck - Supple without masses. No cervical adenopathy or thyromegaly. No tenderness  Clavicles and sternum: non tender  Lungs - Breathing not labored  Chest - Not tender on right. Tender low, lateral left chest. CVA's nontender  Heart - Regular rate & rhythm   Abdomen - Soft, nontender, nondistended with +bowel sounds, no organomegaly.  Pelvis, non tender  Extremities - Moves all extremities. Warm without edema. Pulses noted. Left forearm in a splint. Hand hurts to move, but she can move all fingers.  Neurologic -  Nonfocal. Alert and oriented          Data:   Labs:  Recent Labs   Lab Test 09/15/24  1915 04/01/23  0649 03/31/23  1039   WBC 5.4  --   --    HGB 12.6 12.5 14.6   HCT 37.6  --   --      --   --      Recent Labs   Lab Test 09/16/24  1556 09/15/24  1915 03/31/23  1039   POTASSIUM 4.0 3.9 4.5   CHLORIDE 106 105 97*   CO2 23 25 19*   BUN 15.3 17.8 15.2   CR 0.69 0.81 0.73     Recent Labs   Lab Test 09/15/24  1915   BILITOTAL 0.4   ALT 16   AST 27   ALKPHOS 61     No lab results found.  Recent Labs   Lab Test 09/16/24  1556 09/15/24  1915 03/31/23  1039   ISAURO 8.7* 8.8 9.2     Recent Labs   Lab Test 09/16/24  1556 09/15/24  1915 03/31/23  1039   ANIONGAP 10 12 19*   ALBUMIN  --  3.8  --        Chest and rib views: left rib fx 7, 8  Head CT: no acute injury  L wrist films: fx both bones                 Assessment:   Fall from standing with wrist fx, rib fx. We discussed the need for ambulation and pulmonary care over the next few weeks. The family and patient appear to understand.          Plan:    Ortho care. Ambulate. OK with us to feed. Home when stable and independent.        I have discussed the history, physical, and plan with the patient.   Chaz Gastelum M.D.

## 2024-09-16 NOTE — DISCHARGE INSTRUCTIONS
Discharge Instructions  Syncope    Syncope (fainting) is a sudden, short loss of consciousness (passing out spell). People will usually fall to the ground when they faint or slump over if seated.  People may also shake when this happens, and it can sometimes be difficult to tell the difference between syncope and a seizure. At this time, your provider does not find a reason to suspect that your fainting spell is a sign of anything dangerous or life-threatening.  However, sometimes the signs of serious illness do not show up right away.     Generally, every Emergency Department visit should have a follow-up clinic visit with either a primary or a specialty clinic/provider. Please follow-up as instructed by your emergency provider today.    Return to the Emergency Department if:  You faint again.   You have any significant bleeding.  You have chest pain or a fast or irregular heartbeat.  You feel short of breath.  You cough up any blood.  You have abdominal (belly) pain or unusual back pain.  You have ongoing vomiting (throwing up) or diarrhea (loose stools).  You have a black or tarry bowel movement, or blood in the stool or in your vomit.  You have a fever over 101 F.  You lose feeling or cannot move a part of your body or cannot talk normally.  You are confused, have a headache, cannot see well, or have a seizure.  DO NOT DRIVE. CALL 911 INSTEAD!    What can I do to help myself?  Follow any specific instructions that your provider discussed with you.  If you feel light-headed, make sure to sit down right away, even if you have to sit on the floor.  Follow up with your regular medical provider as discussed for further management. This may include lowering your blood pressure medications, insulin or other diabetic medications, checking your blood sugar more frequently, and drinking more fluids, taking medicines for vomiting or diarrhea or getting up slower.  If you were given a prescription for medicine here today,  be sure to read all of the information (including the package insert) that comes with your prescription.  This will include important information about the medicine, its side effects, and any warnings that you need to know about.  The pharmacist who fills the prescription can provide more information and answer questions you may have about the medicine.  If you have questions or concerns that the pharmacist cannot address, please call or return to the Emergency Department.   Remember that you can always come back to the Emergency Department if you are not able to see your regular provider in the amount of time listed above, if you get any new symptoms, or if there is anything that worries you.      Discharge Instructions  Splint Care    You had a splint put on today to help protect your injury and help it heal.  Splints are used to treat things like strains, sprains, large cuts, and fractures (broken bones). Splints are temporary and are designed to allow for swelling.    Be sure your splint is not too tight!  If you splint is too tight, it may cause loss of blood supply.  Signs of your splint being too tight include: your arm or leg hurting a lot more; your fingers or toes getting numb, cold, pale or blue; or your child is crying, fussing or seeming restless.    Generally, every Emergency Department visit should have a follow-up clinic visit with either a primary or a specialty clinic/provider. Please follow-up as instructed by your emergency provider today.  Return to the Emergency Department right away if:  You have increased pain or pressure around the injury.  You have numbness, tingling, or cool, pale, or blue toes or fingers past the injury.  Your child is more fussy than normal, crying a lot, or restless.  Your splint becomes soft, breaks, or is wet.  Your splint begins to smell bad.  Your splint is cutting into your skin.    Home care:  Keep the injured area above the level of your heart while laying or  sitting down.  This will help decrease the swelling and the pain.  Keep the splint dry.  Do not put objects down or inside the splint.  If there is an elastic bandage (Ace  wrap) holding the splint on this may be loosened slightly to relieve pressure or pain.  If pain continues return to the Emergency Department right away.  Do not remove your splint by yourself unless told to by your provider.    Follow-up:  Sometimes the splint put on in the Emergency Department needs to be changed once the swelling has gone down and a more permanent cast needs to be placed.  This is usually done by a bone specialist provider (Orthopedist).  Follow the instructions given to you by your provider today.    If you were given a prescription for medicine here today, be sure to read all of the information (including the package insert) that comes with your prescription.  This will include important information about the medicine, its side effects, and any warnings that you need to know about.  The pharmacist who fills the prescription can provide more information and answer questions you may have about the medicine.  If you have questions or concerns that the pharmacist cannot address, please call or return to the Emergency Department.     Remember that you can always come back to the Emergency Department if you are not able to see your regular provider in the amount of time listed above, if you get any new symptoms, or if there is anything that worries you.  Opioid Medication Information    You have been given a prescription for an opioid (narcotic) pain medicine and/or have received a pain medicine while here in the Emergency Department. These medicines can make you drowsy or impaired. You must not drive, operate dangerous equipment, or engage in any other dangerous activities while taking these medications. If you drive while taking these medications, you could be arrested for driving under the influence (DUI). Do not drink any  alcohol while you are taking these medications.     Opioid pain medications can cause addiction. If you have a history of chemical dependency of any type, you are at a higher risk of becoming addicted to pain medications.  Only take these prescribed medications to treat your pain when all other options have been tried. Take it for as short a time and as few doses as possible. Store your pain pills in a secure place, as they are frequently stolen and provide a dangerous opportunity for children or visitors in your house to start abusing these powerful medications. We will not replace any lost or stolen medicine.    If you do not finish your medication, it is a good idea to get rid of it but please do not flush it down the toilet. Please dispose of the remaining medication at a local pharmacy or law enforcement facility. The [place] [employer] has additional information on medication disposal: https://www.pca.[email or web address].[email or web address]/managing-[email or web address]-medications.      Many prescription pain medications contain Tylenol  (acetaminophen), including Vicodin , Tylenol #3 , Norco , Lortab , and Percocet .  You should not take any extra pills of Tylenol  if you are using these prescription medications or you can get very sick.  Do not ever take more than 3000 mg of acetaminophen in any 24 hour period.    All opioids tend to cause constipation. Drink plenty of water and eat foods that have a lot of fiber, such as fruits, vegetables, prune juice, apple juice and high fiber cereal.  Take a laxative if you don t move your bowels at least every other day. Miralax , Milk of Magnesia, Colace , or Senna  can be used to keep you regular.
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
## 2024-09-16 NOTE — PROGRESS NOTES
RECEIVING UNIT ED HANDOFF REVIEW    ED Nurse Handoff Report was reviewed by: Soheila Cannon RN on September 16, 2024 at 1:17 AM
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
## 2024-09-17 ENCOUNTER — APPOINTMENT (OUTPATIENT)
Dept: OCCUPATIONAL THERAPY | Facility: CLINIC | Age: 79
DRG: 641 | End: 2024-09-17
Attending: STUDENT IN AN ORGANIZED HEALTH CARE EDUCATION/TRAINING PROGRAM
Payer: COMMERCIAL

## 2024-09-17 ENCOUNTER — APPOINTMENT (OUTPATIENT)
Dept: GENERAL RADIOLOGY | Facility: CLINIC | Age: 79
DRG: 641 | End: 2024-09-17
Attending: STUDENT IN AN ORGANIZED HEALTH CARE EDUCATION/TRAINING PROGRAM
Payer: COMMERCIAL

## 2024-09-17 ENCOUNTER — APPOINTMENT (OUTPATIENT)
Dept: CARDIOLOGY | Facility: CLINIC | Age: 79
DRG: 641 | End: 2024-09-17
Attending: STUDENT IN AN ORGANIZED HEALTH CARE EDUCATION/TRAINING PROGRAM
Payer: COMMERCIAL

## 2024-09-17 VITALS
TEMPERATURE: 98.2 F | SYSTOLIC BLOOD PRESSURE: 113 MMHG | HEART RATE: 60 BPM | RESPIRATION RATE: 16 BRPM | OXYGEN SATURATION: 98 % | DIASTOLIC BLOOD PRESSURE: 55 MMHG

## 2024-09-17 LAB
ALBUMIN UR-MCNC: NEGATIVE MG/DL
ANION GAP SERPL CALCULATED.3IONS-SCNC: 7 MMOL/L (ref 7–15)
APPEARANCE UR: CLEAR
BILIRUB UR QL STRIP: NEGATIVE
BUN SERPL-MCNC: 10.7 MG/DL (ref 8–23)
CALCIUM SERPL-MCNC: 8.4 MG/DL (ref 8.8–10.4)
CHLORIDE SERPL-SCNC: 107 MMOL/L (ref 98–107)
COLOR UR AUTO: ABNORMAL
CREAT SERPL-MCNC: 0.59 MG/DL (ref 0.51–0.95)
EGFRCR SERPLBLD CKD-EPI 2021: >90 ML/MIN/1.73M2
GLUCOSE SERPL-MCNC: 97 MG/DL (ref 70–99)
GLUCOSE UR STRIP-MCNC: NEGATIVE MG/DL
HCO3 SERPL-SCNC: 26 MMOL/L (ref 22–29)
HGB UR QL STRIP: NEGATIVE
KETONES UR STRIP-MCNC: NEGATIVE MG/DL
LEUKOCYTE ESTERASE UR QL STRIP: ABNORMAL
LVEF ECHO: NORMAL
MAGNESIUM SERPL-MCNC: 2.1 MG/DL (ref 1.7–2.3)
NITRATE UR QL: NEGATIVE
PH UR STRIP: 6 [PH] (ref 5–7)
PHOSPHATE SERPL-MCNC: 2.6 MG/DL (ref 2.5–4.5)
POTASSIUM SERPL-SCNC: 4.7 MMOL/L (ref 3.4–5.3)
RBC URINE: 1 /HPF
SODIUM SERPL-SCNC: 140 MMOL/L (ref 135–145)
SP GR UR STRIP: 1.01 (ref 1–1.03)
SQUAMOUS EPITHELIAL: <1 /HPF
UROBILINOGEN UR STRIP-MCNC: NORMAL MG/DL
WBC URINE: 1 /HPF

## 2024-09-17 PROCEDURE — 999N000157 HC STATISTIC RCP TIME EA 10 MIN

## 2024-09-17 PROCEDURE — 97165 OT EVAL LOW COMPLEX 30 MIN: CPT | Mod: GO | Performed by: OCCUPATIONAL THERAPIST

## 2024-09-17 PROCEDURE — 93306 TTE W/DOPPLER COMPLETE: CPT | Mod: 26 | Performed by: INTERNAL MEDICINE

## 2024-09-17 PROCEDURE — 93306 TTE W/DOPPLER COMPLETE: CPT

## 2024-09-17 PROCEDURE — 99239 HOSP IP/OBS DSCHRG MGMT >30: CPT | Performed by: STUDENT IN AN ORGANIZED HEALTH CARE EDUCATION/TRAINING PROGRAM

## 2024-09-17 PROCEDURE — 999N000147 HC STATISTIC PT IP EVAL DEFER

## 2024-09-17 PROCEDURE — 94150 VITAL CAPACITY TEST: CPT

## 2024-09-17 PROCEDURE — 97535 SELF CARE MNGMENT TRAINING: CPT | Mod: GO | Performed by: OCCUPATIONAL THERAPIST

## 2024-09-17 PROCEDURE — 84100 ASSAY OF PHOSPHORUS: CPT | Performed by: STUDENT IN AN ORGANIZED HEALTH CARE EDUCATION/TRAINING PROGRAM

## 2024-09-17 PROCEDURE — 80048 BASIC METABOLIC PNL TOTAL CA: CPT | Performed by: STUDENT IN AN ORGANIZED HEALTH CARE EDUCATION/TRAINING PROGRAM

## 2024-09-17 PROCEDURE — 250N000013 HC RX MED GY IP 250 OP 250 PS 637: Performed by: STUDENT IN AN ORGANIZED HEALTH CARE EDUCATION/TRAINING PROGRAM

## 2024-09-17 PROCEDURE — 71045 X-RAY EXAM CHEST 1 VIEW: CPT

## 2024-09-17 PROCEDURE — 36415 COLL VENOUS BLD VENIPUNCTURE: CPT | Performed by: STUDENT IN AN ORGANIZED HEALTH CARE EDUCATION/TRAINING PROGRAM

## 2024-09-17 PROCEDURE — 83735 ASSAY OF MAGNESIUM: CPT | Performed by: STUDENT IN AN ORGANIZED HEALTH CARE EDUCATION/TRAINING PROGRAM

## 2024-09-17 PROCEDURE — 81001 URINALYSIS AUTO W/SCOPE: CPT | Performed by: STUDENT IN AN ORGANIZED HEALTH CARE EDUCATION/TRAINING PROGRAM

## 2024-09-17 RX ORDER — ACETAMINOPHEN 500 MG
1000 TABLET ORAL EVERY 8 HOURS PRN
Qty: 60 TABLET | Refills: 0 | Status: ON HOLD | OUTPATIENT
Start: 2024-09-17

## 2024-09-17 RX ORDER — DONEPEZIL HYDROCHLORIDE 5 MG/1
5 TABLET, FILM COATED ORAL DAILY
Qty: 30 TABLET | Refills: 0 | Status: ON HOLD | OUTPATIENT
Start: 2024-09-18 | End: 2024-10-18

## 2024-09-17 RX ADMIN — DONEPEZIL HYDROCHLORIDE 5 MG: 5 TABLET, FILM COATED ORAL at 08:36

## 2024-09-17 RX ADMIN — ACETAMINOPHEN 975 MG: 325 TABLET ORAL at 15:30

## 2024-09-17 RX ADMIN — ACETAMINOPHEN 975 MG: 325 TABLET ORAL at 06:14

## 2024-09-17 ASSESSMENT — ACTIVITIES OF DAILY LIVING (ADL)
ADLS_ACUITY_SCORE: 24
PREVIOUS_RESPONSIBILITIES: MEAL PREP;HOUSEKEEPING;LAUNDRY;SHOPPING;YARDWORK;MEDICATION MANAGEMENT;FINANCES;DRIVING
ADLS_ACUITY_SCORE: 24

## 2024-09-17 NOTE — PROVIDER NOTIFICATION
.MD Notification    Notified Person: MD    Notified Person Name:Zainab Bunn     Notification Date/Time:9/17/24 @ 1047    Notification Interaction:Vocera    Purpose of Notification:  FYI her Potassium 4.7 per Lab specimen Moderately Hemolyzed, due to the result will be falsely high. do you want to redraw / thank you    Orders Received: No need to order.     Comments:
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
## 2024-09-17 NOTE — PROGRESS NOTES
DX: L wrist fx, splinted, scalp laceration with 3 sutures, ff a fall    Alert, oriented to self only. Impulsive.  VSS, room air. Pain managed with scheduled Tylenol. Dressing to LUE CDI.  PIV saline locked. Tele: NSR. SBA assist ot BR, voiding adequately.  UA collected, result negative. Discharge TBD.

## 2024-09-17 NOTE — PLAN OF CARE
PT orders received, chart reviewed. OT initiated evaluation. Per discussion with OT, pt mobilizing near baseline. Falls related to syncope. No skilled IP PT needs identified. OT will continue to follow to address mobility needs during IP stay. PT will complete orders

## 2024-09-17 NOTE — PROGRESS NOTES
Patient Alert & Oriented x3, Intermittently confused. VSS on RA. Pain controlled with tylenol. Dressing LUE intact. Sling on LUE for comfort. Tele NSR. Family by Bedside. Up with SBA, walked the hallway. OK to Be discharge home with OT & pt ready to be discharged home. All belonging & Discharge instruction Folder with pt & Family member.

## 2024-09-17 NOTE — DISCHARGE SUMMARY
[facility]  Hospitalist Discharge Summary      Date of Admission:  9/15/2024  Date of Discharge:  9/17/2024  5:30 PM  Discharging Provider: Zainab Bunn MD  Discharge Service: Hospitalist Service    Discharge Diagnoses     Clinically Significant Risk Factors          Follow-ups Needed After Discharge   Follow-up Appointments     Follow-up and recommended labs and tests       Follow up with a hand/wrist specialist at [facility] [place]   (preferred location) such as Dr. Jose Cruz Jung, Dr. Ami Chavez, Dr. Marsha Lucas, or Dr. Juliana Carballo within 1 week for repeat imaging and   reevaluation of the left wrist. Family should call [phone] to   schedule.        Follow-up and recommended labs and tests       Follow up with primary care provider, Jae Mendiola, within 7   days for hospital follow- up.  The following labs/tests are recommended:   cbc and bmp.          Unresulted Labs Ordered in the Past 30 Days of this Admission       No orders found from 8/16/2024 to 9/16/2024.          Discharge Disposition   Discharged to home  Condition at discharge: Stable    Hospital Course    Connie Greenwood is a 79 year old female with a past medical history of hypertension, dyslipidemia, primary degenerative dementia presents hospital after a fall with loss of consciousness.     Fall  Possible syncopal episode  .  CBC, chemistry, EKG, CT head all within normal limits.  She may have syncopized.  She denies any palpitations, chest pain, shortness of breath before or after her syncopal episode.  She was experiencing dizziness and lightheadedness in the emergency room and may be mildly dehydrated.  Patient could have vasovagal syncope because of hot weather/dehydration and was a musical festival  Telemetry did not show any concerning arrhythmias.  Patient's symptoms of dizziness improved she was discharged home.    Close follow-up with primary care provider              Left sided rib Fractures     -Rib fracture protocol  -Consulted trauma surgery discussed with trauma surgery and no additional workup needed for the patient  -Patient was seen by trauma surgery and no additional workup recommended  -Incentive spirometry  -Patient does not want any oxycodone for pain and okay with Tylenol        # Delirium/Confusion  # Cognitive decline, Dementia ?     .  She has a history of dementia and she has been previously on Aricept.  She follows [facility] neurology  Patient has stopped taking Aricept due to increased urination.  Currently patient is more confused as per the family  No no pneumonia on chest x-ray and urinalysis does not show any UTI  Patient's mental status improved and she was at her baseline cognition.  Therapy has seen the patient and discharged home with home assist    Patient's family members live nearby and assist the patient when needed    Patient stopped taking Aricept at in the evening because of increased urinary frequency and asked her to take Aricept in the morning at a lower dose of 5 mg and this can be closely followed up by her primary care to see if the dose can be increased        Fractured left wrist  Comminuted, transverse predominant, impacted and angulated fracture of the distal left radial meta physis.  Mildly impacted transverse fracture distal left ulna.  In a splint  -Ortho consulted    -No surgical intervention outpatient follow-up with orthopedics  -Patient asked not to drive until she is in the splint and outpatient follow-up with orthopedics    Scalp laceration  Status post 3 nylon sutures in emergency room.  Removed in 5 days ~9/20  -Patient needs outpatient follow-up with primary care to remove sutures     Chronic stable medical conditions:  Hypertension  Dyslipidemia  Primary degenerative dementia    Consultations This Hospital Stay   ORTHOPEDIC SURGERY IP CONSULT  SURGERY GENERAL IP CONSULT  PHYSICAL THERAPY ADULT IP  CONSULT  OCCUPATIONAL THERAPY ADULT IP CONSULT  CARE MANAGEMENT / SOCIAL WORK IP CONSULT    Code Status   Full Code    Time Spent on this Encounter   I, Zainab Bunn MD, personally saw the patient today and spent greater than 30 minutes discharging this patient.       Zainab Bunn MD  [facility]  [address] [place] [facility] [address]  Phone: [phone]  Fax: [phone]  ______________________________________________________________________    Physical Exam   Vital Signs: Temp: 98.2  F (36.8  C) Temp src: Oral BP: 113/55 Pulse: 60   Resp: 16 SpO2: 98 % O2 Device: None (Room air)    Weight: 0 lbs 0 oz  Physical Exam  Cardiovascular:      Rate and Rhythm: Normal rate and regular rhythm.      Heart sounds: Normal heart sounds.   Pulmonary:      Effort: Pulmonary effort is normal. No respiratory distress.      Breath sounds: Normal breath sounds.   Abdominal:      General: There is no distension.      Palpations: Abdomen is soft.      Tenderness: There is no abdominal tenderness.   Musculoskeletal:      Comments: Splint +             Primary Care Physician   Jae Mendiola    Discharge Orders      Follow-up and recommended labs and tests     Follow up with a [facility]wrist specialist [facility] (preferred [facility]) such as Dr. Jose Cruz Jung, Dr. Ami Chavez, Dr. Marsha Lucas, or Dr. Juliana Carballo within 1 week for repeat imaging and reevaluation of the left wrist. Family should call [phone] to schedule.     Activity    Your activity upon discharge: Left wrist:  -Remain in right sugar tong splint. This must remain clean, dry, and intact at all times. Cover securely for showering. Absolutely no soaking.   -No lifting more than an eating utensil with the left hand. Okay for use of platform walker if needed.  -Okay for finger/thumb and shoulder ROMAT. Sling ordered to wear for comfort.  -Encourage use of cold compresses and  elevation for swelling management.  -Analgesics per medicine team.     Reason for your hospital stay    Fall ,Loss of consciouness     Follow-up and recommended labs and tests     Follow up with primary care provider, Jae Mendiola, within 7 days for hospital follow- up.  The following labs/tests are recommended: cbc and bmp.     Activity    No driving until cleared by orthopedics     Neck/Shoulder/Back/Abdomen Bracing Supplies Order Sling; Left    Bracing Documentation:   Patient requires the use of the ordered bracing device due to following medical need/condition: Left wrist fracture.    I, the undersigned, certify that the above prescribed supplies are medically necessary for this patient and is both reasonable and necessary in reference to accepted standards of medical and necessary in reference to accepted standards of medical practice in the treatment of this patient's condition and is not prescribed as a convenience.     Diet    Follow this diet upon discharge: Current Diet:Orders Placed This Encounter      Regular Diet Adult       Significant Results and Procedures   Most Recent 3 CBC's:  Recent Labs   Lab Test 09/15/24  1915 04/01/23  0649 03/31/23  1039   WBC 5.4  --   --    HGB 12.6 12.5 14.6   MCV 95  --   --      --   --      Most Recent 3 BMP's:  Recent Labs   Lab Test 09/17/24  0817 09/16/24  1556 09/15/24  1915    139 142   POTASSIUM 4.7 4.0 3.9   CHLORIDE 107 106 105   CO2 26 23 25   BUN 10.7 15.3 17.8   CR 0.59 0.69 0.81   ANIONGAP 7 10 12   ISAURO 8.4* 8.7* 8.8   GLC 97 113* 94   ,   Results for orders placed or performed during the hospital encounter of 09/15/24   XR Wrist Left G/E 3 Views    Narrative    EXAM: XR WRIST LEFT G/E 3 VIEWS  LOCATION: [facility]  DATE: 9/15/2024    INDICATION: Fall, swelling deformed.  COMPARISON: None.      Impression    IMPRESSION: Comminuted, transverse predominant, impacted and angulated fracture distal left radial  metaphysis. Mildly impacted transverse fracture distal left ulna. Mild ulnar positive variance. Left wrist soft tissue swelling. Carpal bones and   metacarpals intact. Degenerative change at the thumb CMC and STT joints. Diffuse bone demineralization.    NOTE: ABNORMAL REPORT    THE DICTATION ABOVE DESCRIBES AN ABNORMALITY FOR WHICH FOLLOW-UP IS NEEDED.    CT Head w/o Contrast    Narrative    EXAM: CT HEAD W/O CONTRAST  LOCATION: [facility]  DATE: 9/15/2024    INDICATION: Fall, head injury, left forehead laceration.  COMPARISON: None.  TECHNIQUE: Routine CT Head without IV contrast. Multiplanar reformats. Dose reduction techniques were used.    FINDINGS:  INTRACRANIAL CONTENTS: No intracranial hemorrhage, extraaxial collection, or mass effect.  No CT evidence of acute infarct. Mild presumed chronic small vessel ischemic changes. Mild generalized volume loss. No hydrocephalus.     VISUALIZED ORBITS/SINUSES/MASTOIDS: No intraorbital abnormality. No paranasal sinus mucosal disease. No middle ear or mastoid effusion.    BONES/SOFT TISSUES: No acute abnormality.      Impression    IMPRESSION:  1.  No CT evidence for acute intracranial process.  2.  Brain atrophy and presumed chronic microvascular ischemic changes as above.   XR Ribs & Chest Left G/E 3 Views    Narrative    EXAM: XR RIBS AND CHEST LEFT 3 VIEWS  LOCATION: [facility]  DATE: 9/16/2024    INDICATION: left sided pain after fall  COMPARISON: None.      Impression    IMPRESSION: Cardiomediastinal silhouette within normal limits. No focal consolidation or pleural effusion. Acute fracture anterolateral left rib 7 and 8. Multiple remote left rib fractures. Surgical clips right upper quadrant.   XR Wrist Left G/E 3 Views    Narrative    LEFT WRIST THREE OR MORE VIEWS  9/16/2024 3:50 PM     HISTORY: Left distal radius and distal ulna fractures, evaluate for  post-splint application and possible reduction  alignment.    COMPARISON: 9/15/2024      Impression    IMPRESSION:  Reduced and splinted distal left radial metaphyseal  fracture and comminuted ulnar metaphyseal and styloid fracture. There  is similar impaction and apex volar angulation. Splinting obscures  fine osseous detail.    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    MISHEL FERMIN MD         SYSTEM ID:  [id number]   XR Chest 1 View    Narrative    CHEST ONE VIEW  2024 10:23 AM     HISTORY: Trauma Patient with Rib Fracture(s).    COMPARISON: Chest radiograph 2024.      Impression    IMPRESSION: No focal consolidation, significant pleural effusion or  convincing pneumothorax. Acute left rib fractures are better  appreciated on the prior dedicated rib radiograph. Remote left rib  fractures.    DANIELA DE LA CRUZ MD         SYSTEM ID:  [id number]   Echocardiogram Complete     Value    LVEF  60-65%    Narrative    [id number]  [id number]  [id number]  [id number]^GALO^PATRICA^WESTON     [facility]  Echocardiography Laboratory  [address] [place] [address]     Name: DAHLIA CARRERA  MRN: [id number]  : 1945  Study Date: 2024 01:04 PM  Age: 79 yrs  Gender: Female  Patient Location: [facility]  Reason For Study: Syncope and Collapse, Syncope  Ordering Physician: PATRICA ROSENTHAL  Referring Physician: Jae Mendiola  Performed By: Parker Medellin     BSA: 1.9 m2  Height: 66 in  Weight: 176 lb  HR: 63  BP: 115/61 mmHg  ______________________________________________________________________________  Procedure  Complete Portable Echo Adult.  ______________________________________________________________________________  Interpretation Summary     1. Normal biventricular size and function. Left ventricular ejection fraction  of 60-65%.  2. No segmental wall motion abnormalities noted.  3. There is mild (1+) tricuspid regurgitation.  No prior study for comparison.  Technically adequate study.  ______________________________________________________________________________  Left Ventricle  The left ventricle is normal in size. There is normal left ventricular wall  thickness. Left ventricular systolic function is normal. The visual ejection  fraction is 60-65%. Grade I or early diastolic dysfunction. No regional wall  motion abnormalities noted.     Right Ventricle  The right ventricle is normal in size and function.     Atria  The left atrium is mildly dilated. Right atrial size is normal.     Mitral Valve  The mitral valve leaflets appear normal. There is no evidence of stenosis,  fluttering, or prolapse. There is trace mitral regurgitation.     Tricuspid Valve  Normal tricuspid valve. There is mild (1+) tricuspid regurgitation. The right  ventricular systolic pressure is approximated at 28.7 mmHg plus the right  atrial pressure.     Aortic Valve  There is mild trileaflet aortic sclerosis. There is trace aortic  regurgitation.     Pulmonic Valve  The pulmonic valve is not well visualized.     Vessels  Normal size aorta. IVC diameter <2.1 cm collapsing >50% with sniff suggests a  normal RA pressure of 3 mmHg.     Pericardium  There is no pericardial effusion.     Rhythm  Sinus rhythm was noted.  ______________________________________________________________________________  MMode/2D Measurements & Calculations  IVSd: 0.80 cm     LVIDd: 4.2 cm  LVIDs: 3.4 cm  LVPWd: 0.90 cm  FS: 20.0 %  LV mass(C)d: 109.8 grams  LV mass(C)dI: 58.0 grams/m2  Ao root diam: 3.0 cm  asc Aorta Diam: 3.4 cm  LVOT diam: 1.9 cm  LVOT area: 2.8 cm2  Ao root diam index Ht(cm/m): 1.8  Ao root diam index BSA (cm/m2): 1.6  Asc Ao diam index BSA (cm/m2): 1.8  Asc Ao diam index Ht(cm/m): 2.0  LA Volume (BP): 47.9 ml     LA Volume Index (BP): 25.3 ml/m2  RV Base: 2.4 cm  RWT: 0.43  TAPSE: 2.4 cm     Doppler Measurements & Calculations  MV E max jordon: 83.8 cm/sec  MV A max jordon: 104.0 cm/sec  MV E/A: 0.81  MV dec  time: 0.26 sec  PA acc time: 0.09 sec  TR max kendall: 267.9 cm/sec  TR max P.7 mmHg  E/E' av.8  Lateral E/e': 6.0  Medial E/e': 11.5  RV S Kendall: 16.9 cm/sec     ______________________________________________________________________________  Report approved by: Pia Carrasco 2024 02:55 PM             Discharge Medications   Current Discharge Medication List        START taking these medications    Details   acetaminophen (TYLENOL) 500 MG tablet Take 2 tablets (1,000 mg) by mouth every 8 hours as needed for pain or fever.  Qty: 60 tablet, Refills: 0    Associated Diagnoses: Wrist fracture, left, closed, initial encounter      donepezil (ARICEPT) 5 MG tablet Take 1 tablet (5 mg) by mouth daily.  Qty: 30 tablet, Refills: 0    Associated Diagnoses: Dementia without behavioral disturbance, psychotic disturbance, mood disturbance, or anxiety, unspecified dementia severity, unspecified dementia type (H)           CONTINUE these medications which have NOT CHANGED    Details   ibuprofen (ADVIL/MOTRIN) 200 MG tablet Take 200 mg by mouth daily as needed for pain.      latanoprost (XALATAN) 0.005 % ophthalmic solution Place 1 drop into both eyes At Bedtime      polyethylene glycol (MIRALAX) 17 GM/Dose powder Take 17 g by mouth daily as needed for constipation.      simvastatin (ZOCOR) 20 MG tablet Take 20 mg by mouth At Bedtime      spironolactone (ALDACTONE) 25 MG tablet Take 25 mg by mouth daily           Allergies   No Known Allergies

## 2024-09-18 ENCOUNTER — PATIENT OUTREACH (OUTPATIENT)
Dept: CARE COORDINATION | Facility: CLINIC | Age: 79
End: 2024-09-18
Payer: COMMERCIAL

## 2024-09-18 NOTE — PROGRESS NOTES
"Clinic Care Coordination Contact  Transitions of Care Outreach  Chief Complaint   Patient presents with    Clinic Care Coordination - [facility]       Most Recent Admission Date: 9/15/2024   Most Recent Admission Diagnosis: Syncope and collapse - R55  Closed head injury, initial encounter - S09.90XA  Laceration of forehead, initial encounter - S01.81XA  Wrist fracture, left, closed, initial encounter - S62.102A  Radius with ulna, distal end fracture, left, closed, initial encounter - S52.502A, S52.602A     Most Recent Discharge Date: 9/17/2024   Most Recent Discharge Diagnosis: Syncope and collapse - R55  Closed head injury, initial encounter - S09.90XA  Laceration of forehead, initial encounter - S01.81XA  Wrist fracture, left, closed, initial encounter - S62.102A  Radius with ulna, distal end fracture, left, closed, initial encounter - S52.502A, S52.602A  Dementia without behavioral disturbance, psychotic disturbance, mood disturbance, or anxiety, unspecified dementia severity, unspecified dementia type (H) - F03.90     Transitions of Care Assessment    Discharge Assessment  How are you doing now that you are home?: \" Okay \"  How are your symptoms? (Red Flag symptoms escalate to triage hotline per guidelines): Improved  Do you know how to contact your clinic care team if you have future questions or changes to your health status? : Yes  Does the patient have their discharge instructions? : Yes  Does the patient have questions regarding their discharge instructions? : No  Were you started on any new medications or were there changes to any of your previous medications? : Yes  Does the patient have all of their medications?: Yes  Do you have questions regarding any of your medications? : No  Do you have all of your needed medical supplies or equipment (DME)?  (i.e. oxygen tank, CPAP, cane, etc.): Yes    Post-op ([facility] CTA Only)  If the patient had a surgery or procedure, do they have any questions for a nurse?: " No             Follow up Plan     Discharge Follow-Up  Discharge follow up appointment scheduled in alignment with recommended follow up timeframe or Transitions of Risk Category? (Low = within 30 days; Moderate= within 14 days; High= within 7 days): No    No future appointments.    Outpatient Plan as outlined on AVS reviewed with patient.    For any urgent concerns, please contact our 24 hour nurse triage line: [phone] ([phone])       Fifi Pinon MA

## 2024-09-18 NOTE — PLAN OF CARE
Occupational Therapy Discharge Summary    Reason for therapy discharge:    Discharged to home.    Progress towards therapy goal(s). See goals on Care Plan in [facility] electronic health record for goal details.  Goals partially met.  Barriers to achieving goals:   discharge from facility.    Therapy recommendation(s):    Patient appears near her baseline with ADLs and mobility. Pt will require assist with IADLs including driving, grocery shopping, laundry and cooking. Family reports they are able to assist as needed. Defer further therapy until after restrictions are lifted.

## 2024-10-06 ENCOUNTER — APPOINTMENT (OUTPATIENT)
Dept: CT IMAGING | Facility: CLINIC | Age: 79
DRG: 965 | End: 2024-10-06
Attending: PHYSICIAN ASSISTANT
Payer: COMMERCIAL

## 2024-10-06 ENCOUNTER — APPOINTMENT (OUTPATIENT)
Dept: GENERAL RADIOLOGY | Facility: CLINIC | Age: 79
DRG: 965 | End: 2024-10-06
Attending: STUDENT IN AN ORGANIZED HEALTH CARE EDUCATION/TRAINING PROGRAM
Payer: COMMERCIAL

## 2024-10-06 ENCOUNTER — HOSPITAL ENCOUNTER (INPATIENT)
Facility: CLINIC | Age: 79
LOS: 4 days | Discharge: NURSING, REHAB OR LONG-TERM CARE | DRG: 965 | End: 2024-10-10
Attending: STUDENT IN AN ORGANIZED HEALTH CARE EDUCATION/TRAINING PROGRAM | Admitting: INTERNAL MEDICINE
Payer: COMMERCIAL

## 2024-10-06 ENCOUNTER — APPOINTMENT (OUTPATIENT)
Dept: CT IMAGING | Facility: CLINIC | Age: 79
DRG: 965 | End: 2024-10-06
Attending: STUDENT IN AN ORGANIZED HEALTH CARE EDUCATION/TRAINING PROGRAM
Payer: COMMERCIAL

## 2024-10-06 DIAGNOSIS — S06.5XAA SUBDURAL HEMATOMA (H): Primary | ICD-10-CM
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
## 2024-10-06 DIAGNOSIS — T79.6XXA TRAUMATIC RHABDOMYOLYSIS, INITIAL ENCOUNTER (H): ICD-10-CM

## 2024-10-06 DIAGNOSIS — S42.002A CLOSED NONDISPLACED FRACTURE OF LEFT CLAVICLE, UNSPECIFIED PART OF CLAVICLE, INITIAL ENCOUNTER: ICD-10-CM

## 2024-10-06 PROBLEM — G47.33 OBSTRUCTIVE SLEEP APNEA ON CPAP: Status: ACTIVE | Noted: 2018-07-06

## 2024-10-06 PROBLEM — F03.90: Status: ACTIVE | Noted: 2023-05-12

## 2024-10-06 PROBLEM — I10 HTN (HYPERTENSION): Status: ACTIVE | Noted: 2017-06-14

## 2024-10-06 PROBLEM — R60.9 EDEMA: Status: ACTIVE | Noted: 2024-10-06

## 2024-10-06 LAB
ALBUMIN UR-MCNC: NEGATIVE MG/DL
ANION GAP SERPL CALCULATED.3IONS-SCNC: 14 MMOL/L (ref 7–15)
APPEARANCE UR: CLEAR
ATRIAL RATE - MUSE: 68 BPM
BASOPHILS # BLD AUTO: 0 10E3/UL (ref 0–0.2)
BASOPHILS NFR BLD AUTO: 0 %
BILIRUB UR QL STRIP: NEGATIVE
BUN SERPL-MCNC: 20 MG/DL (ref 8–23)
CALCIUM SERPL-MCNC: 9.1 MG/DL (ref 8.8–10.4)
CHLORIDE SERPL-SCNC: 101 MMOL/L (ref 98–107)
CK SERPL-CCNC: 1186 U/L (ref 26–192)
COLOR UR AUTO: ABNORMAL
CREAT SERPL-MCNC: 0.74 MG/DL (ref 0.51–0.95)
DIASTOLIC BLOOD PRESSURE - MUSE: NORMAL MMHG
EGFRCR SERPLBLD CKD-EPI 2021: 82 ML/MIN/1.73M2
EOSINOPHIL # BLD AUTO: 0 10E3/UL (ref 0–0.7)
EOSINOPHIL NFR BLD AUTO: 0 %
ERYTHROCYTE [DISTWIDTH] IN BLOOD BY AUTOMATED COUNT: 12.6 % (ref 10–15)
GLUCOSE SERPL-MCNC: 99 MG/DL (ref 70–99)
GLUCOSE UR STRIP-MCNC: NEGATIVE MG/DL
HCO3 SERPL-SCNC: 25 MMOL/L (ref 22–29)
HCT VFR BLD AUTO: 37.1 % (ref 35–47)
HGB BLD-MCNC: 12.7 G/DL (ref 11.7–15.7)
HGB UR QL STRIP: NEGATIVE
HOLD SPECIMEN: 0
HOLD SPECIMEN: 0
HOLD SPECIMEN: NORMAL
IMM GRANULOCYTES # BLD: 0 10E3/UL
IMM GRANULOCYTES NFR BLD: 0 %
INTERPRETATION ECG - MUSE: NORMAL
KETONES UR STRIP-MCNC: ABNORMAL MG/DL
LEUKOCYTE ESTERASE UR QL STRIP: NEGATIVE
LYMPHOCYTES # BLD AUTO: 0.8 10E3/UL (ref 0.8–5.3)
LYMPHOCYTES NFR BLD AUTO: 6 %
MCH RBC QN AUTO: 32.1 PG (ref 26.5–33)
MCHC RBC AUTO-ENTMCNC: 34.2 G/DL (ref 31.5–36.5)
MCV RBC AUTO: 94 FL (ref 78–100)
MONOCYTES # BLD AUTO: 1.2 10E3/UL (ref 0–1.3)
MONOCYTES NFR BLD AUTO: 9 %
MUCOUS THREADS #/AREA URNS LPF: PRESENT /LPF
NEUTROPHILS # BLD AUTO: 12 10E3/UL (ref 1.6–8.3)
NEUTROPHILS NFR BLD AUTO: 85 %
NITRATE UR QL: NEGATIVE
NRBC # BLD AUTO: 0 10E3/UL
NRBC BLD AUTO-RTO: 0 /100
P AXIS - MUSE: 46 DEGREES
PH UR STRIP: 5.5 [PH] (ref 5–7)
PLATELET # BLD AUTO: 252 10E3/UL (ref 150–450)
POTASSIUM SERPL-SCNC: 4 MMOL/L (ref 3.4–5.3)
PR INTERVAL - MUSE: 154 MS
QRS DURATION - MUSE: 82 MS
QT - MUSE: 400 MS
QTC - MUSE: 425 MS
R AXIS - MUSE: 13 DEGREES
RBC # BLD AUTO: 3.96 10E6/UL (ref 3.8–5.2)
RBC URINE: <1 /HPF
SODIUM SERPL-SCNC: 140 MMOL/L (ref 135–145)
SP GR UR STRIP: 1.01 (ref 1–1.03)
SQUAMOUS EPITHELIAL: <1 /HPF
SYSTOLIC BLOOD PRESSURE - MUSE: NORMAL MMHG
T AXIS - MUSE: 67 DEGREES
UROBILINOGEN UR STRIP-MCNC: NORMAL MG/DL
VENTRICULAR RATE- MUSE: 68 BPM
WBC # BLD AUTO: 14 10E3/UL (ref 4–11)
WBC URINE: 1 /HPF

## 2024-10-06 PROCEDURE — 250N000013 HC RX MED GY IP 250 OP 250 PS 637: Performed by: PHYSICIAN ASSISTANT

## 2024-10-06 PROCEDURE — 23500 CLTX CLAVICULAR FX W/O MNPJ: CPT | Mod: LT

## 2024-10-06 PROCEDURE — 85025 COMPLETE CBC W/AUTO DIFF WBC: CPT | Performed by: STUDENT IN AN ORGANIZED HEALTH CARE EDUCATION/TRAINING PROGRAM

## 2024-10-06 PROCEDURE — 93005 ELECTROCARDIOGRAM TRACING: CPT

## 2024-10-06 PROCEDURE — 99207 PR APP CREDIT; MD BILLING SHARED VISIT: CPT | Performed by: PHYSICIAN ASSISTANT

## 2024-10-06 PROCEDURE — 81001 URINALYSIS AUTO W/SCOPE: CPT | Performed by: STUDENT IN AN ORGANIZED HEALTH CARE EDUCATION/TRAINING PROGRAM

## 2024-10-06 PROCEDURE — 73030 X-RAY EXAM OF SHOULDER: CPT | Mod: LT

## 2024-10-06 PROCEDURE — 70450 CT HEAD/BRAIN W/O DYE: CPT

## 2024-10-06 PROCEDURE — 82550 ASSAY OF CK (CPK): CPT | Performed by: PHYSICIAN ASSISTANT

## 2024-10-06 PROCEDURE — 99223 1ST HOSP IP/OBS HIGH 75: CPT | Mod: FS | Performed by: INTERNAL MEDICINE

## 2024-10-06 PROCEDURE — 80048 BASIC METABOLIC PNL TOTAL CA: CPT | Performed by: STUDENT IN AN ORGANIZED HEALTH CARE EDUCATION/TRAINING PROGRAM

## 2024-10-06 PROCEDURE — 258N000003 HC RX IP 258 OP 636: Performed by: PHYSICIAN ASSISTANT

## 2024-10-06 PROCEDURE — 120N000013 HC R&B IMCU

## 2024-10-06 PROCEDURE — 36415 COLL VENOUS BLD VENIPUNCTURE: CPT | Performed by: STUDENT IN AN ORGANIZED HEALTH CARE EDUCATION/TRAINING PROGRAM

## 2024-10-06 PROCEDURE — 70450 CT HEAD/BRAIN W/O DYE: CPT | Mod: 77

## 2024-10-06 PROCEDURE — 99285 EMERGENCY DEPT VISIT HI MDM: CPT | Mod: 25

## 2024-10-06 RX ORDER — ACETAMINOPHEN 650 MG/1
650 SUPPOSITORY RECTAL EVERY 4 HOURS PRN
Status: DISCONTINUED | OUTPATIENT
Start: 2024-10-06 | End: 2024-10-10 | Stop reason: HOSPADM

## 2024-10-06 RX ORDER — LATANOPROST 50 UG/ML
1 SOLUTION/ DROPS OPHTHALMIC AT BEDTIME
Status: DISCONTINUED | OUTPATIENT
Start: 2024-10-06 | End: 2024-10-10 | Stop reason: HOSPADM

## 2024-10-06 RX ORDER — DONEPEZIL HYDROCHLORIDE 5 MG/1
5 TABLET, FILM COATED ORAL DAILY
Status: DISCONTINUED | OUTPATIENT
Start: 2024-10-07 | End: 2024-10-10 | Stop reason: HOSPADM

## 2024-10-06 RX ORDER — CALCIUM CARBONATE 500 MG/1
1000 TABLET, CHEWABLE ORAL 4 TIMES DAILY PRN
Status: DISCONTINUED | OUTPATIENT
Start: 2024-10-06 | End: 2024-10-10 | Stop reason: HOSPADM

## 2024-10-06 RX ORDER — LIDOCAINE 4 G/G
1 PATCH TOPICAL
Status: DISCONTINUED | OUTPATIENT
Start: 2024-10-07 | End: 2024-10-10 | Stop reason: HOSPADM

## 2024-10-06 RX ORDER — AMOXICILLIN 250 MG
2 CAPSULE ORAL 2 TIMES DAILY PRN
Status: DISCONTINUED | OUTPATIENT
Start: 2024-10-06 | End: 2024-10-10 | Stop reason: HOSPADM

## 2024-10-06 RX ORDER — ONDANSETRON 4 MG/1
4 TABLET, ORALLY DISINTEGRATING ORAL EVERY 6 HOURS PRN
Status: DISCONTINUED | OUTPATIENT
Start: 2024-10-06 | End: 2024-10-10 | Stop reason: HOSPADM

## 2024-10-06 RX ORDER — AMOXICILLIN 250 MG
1 CAPSULE ORAL 2 TIMES DAILY PRN
Status: DISCONTINUED | OUTPATIENT
Start: 2024-10-06 | End: 2024-10-10 | Stop reason: HOSPADM

## 2024-10-06 RX ORDER — LIDOCAINE 40 MG/G
CREAM TOPICAL
Status: DISCONTINUED | OUTPATIENT
Start: 2024-10-06 | End: 2024-10-10 | Stop reason: HOSPADM

## 2024-10-06 RX ORDER — MULTIPLE VITAMINS W/ MINERALS TAB 9MG-400MCG
1 TAB ORAL DAILY
COMMUNITY

## 2024-10-06 RX ORDER — HYDRALAZINE HYDROCHLORIDE 20 MG/ML
10 INJECTION INTRAMUSCULAR; INTRAVENOUS EVERY 6 HOURS PRN
Status: DISCONTINUED | OUTPATIENT
Start: 2024-10-06 | End: 2024-10-10 | Stop reason: HOSPADM

## 2024-10-06 RX ORDER — SIMVASTATIN 20 MG
20 TABLET ORAL AT BEDTIME
Status: DISCONTINUED | OUTPATIENT
Start: 2024-10-06 | End: 2024-10-10 | Stop reason: HOSPADM

## 2024-10-06 RX ORDER — ACETAMINOPHEN 325 MG/1
650 TABLET ORAL EVERY 4 HOURS PRN
Status: DISCONTINUED | OUTPATIENT
Start: 2024-10-06 | End: 2024-10-10 | Stop reason: HOSPADM

## 2024-10-06 RX ORDER — SODIUM CHLORIDE 9 MG/ML
INJECTION, SOLUTION INTRAVENOUS CONTINUOUS
Status: ACTIVE | OUTPATIENT
Start: 2024-10-06 | End: 2024-10-07

## 2024-10-06 RX ORDER — ONDANSETRON 2 MG/ML
4 INJECTION INTRAMUSCULAR; INTRAVENOUS EVERY 6 HOURS PRN
Status: DISCONTINUED | OUTPATIENT
Start: 2024-10-06 | End: 2024-10-10 | Stop reason: HOSPADM

## 2024-10-06 RX ORDER — AMLODIPINE BESYLATE 2.5 MG/1
2.5 TABLET ORAL DAILY
Status: DISCONTINUED | OUTPATIENT
Start: 2024-10-06 | End: 2024-10-10 | Stop reason: HOSPADM

## 2024-10-06 RX ADMIN — LATANOPROST 1 DROP: 50 SOLUTION OPHTHALMIC at 22:07

## 2024-10-06 RX ADMIN — AMLODIPINE BESYLATE 2.5 MG: 2.5 TABLET ORAL at 15:52

## 2024-10-06 RX ADMIN — SODIUM CHLORIDE: 9 INJECTION, SOLUTION INTRAVENOUS at 14:40

## 2024-10-06 RX ADMIN — ACETAMINOPHEN 650 MG: 325 TABLET ORAL at 19:00

## 2024-10-06 ASSESSMENT — ACTIVITIES OF DAILY LIVING (ADL)
ADLS_ACUITY_SCORE: 38
ADLS_ACUITY_SCORE: 45
ADLS_ACUITY_SCORE: 38
ADLS_ACUITY_SCORE: 43
ADLS_ACUITY_SCORE: 43
ADLS_ACUITY_SCORE: 38

## 2024-10-06 NOTE — PROGRESS NOTES
Neurosurgery Note    On-call Neurosurgery team was contacted regarding 79 year old female who presented to the [facility] ED due to fall in home overnight. No neuro deficits, not on blood  thinners. No significant headache. Patient also with clavicle fracture.      Head CT WO   IMPRESSION:  1.  New subdural hematoma along the left frontoparietal convexity with mild mass effect on the frontal and parietal lobes and 3 mm rightward midline shift.     These findings were communicated by phone to Dr. Galeas on 10/6/2024 12:01 PM CDT        RECOMMENDATIONS:   --Repeat head CT in six hours (ordered)   --HOB > 30 degrees   --SBP < 150 mmHg  --Neuro checks q2h  --No ASA or anti-inflammatory products   --Neurosurgery will formally consult this afternoon       Case reviewed with Dr. Radu Gomez, PA-[facility] Neurosurgery

## 2024-10-06 NOTE — PROGRESS NOTES
"[facility]  ED Nurse Handoff Report    ED Chief complaint: Fall      ED Diagnosis:   Final diagnoses:   Subdural hematoma (H)   Fall, initial encounter   Closed nondisplaced fracture of left clavicle, unspecified part of clavicle, initial encounter       Code Status: to be discussed with provider    Allergies: No Known Allergies    Patient Story: Patient had an unwitnessed fall sometime between when son dropped her off at home last night to early this morning when he checked on her. Patient was found down in the closet  laying on her back on a carpeted surface. Patient has early stages of dementia and does not remember the fall.  Focused Assessment:  On assessment patient was reported to have 2/10 left shoulder pain at rest and 10/10 left shoulder pain with movement. Patient also endorses low back pain. On imaging, patient was found to have a new left clavicle fracture and a left frontoparietal SDH with a 3 mm right midline shift.    Treatments and/or interventions provided: Labs drawn and imaging done. HOB placed at 30 degrees and SBP monitored to keep below 150.  Patient's response to treatments and/or interventions: Patient tolerated interventions well.    To be done/followed up on inpatient unit:  Strict fall precautions. Keep SBP <150 and HOB >30*. Follow plan of care. Treat any pain.    Does this patient have any cognitive concerns?: Alzheimer's    Activity level - Baseline/Home:  Independent  Activity Level - Current:   Stand with assist x2    Patient's Preferred language: English   Needed?: No    Isolation: None  Infection: Not Applicable  Patient tested for COVID 19 prior to admission: NO  Bariatric?: No    Vital Signs:   Vitals:    10/06/24 1054 10/06/24 1300   BP: (!) 148/74 132/71   Pulse: 72 71   Resp: 17 14   Temp: 97.9  F (36.6  C)    TempSrc: Oral    SpO2: 100% 99%   Weight: 73.5 kg (162 lb)    Height: 1.676 m (5' 6\")        Cardiac Rhythm:     Was the PSS-3 completed:   " Yes  What interventions are required if any?  None needed             Family Comments: Family at bedside and supportive in cares.  OBS brochure/video discussed/provided to patient/family: No              Name of person given brochure if not patient: n/a              Relationship to patient:     For the majority of the shift this patient's behavior was Yellow for forgetfulness.   Behavioral interventions performed were none needed.    ED NURSE PHONE NUMBER: [phone]

## 2024-10-06 NOTE — PHARMACY-ADMISSION MEDICATION HISTORY
Pharmacy Intern Admission Medication History    Admission medication history is complete. The information provided in this note is only as accurate as the sources available at the time of the update.    Information Source(s): Patient, Family member, and CareEverywhere/SureScripts via in-person    Pertinent Information: None    Changes made to PTA medication list:  Added:   Tylenol PM  Multivitamin  Deleted: None  Changed: None    Allergies reviewed with patient and updates made in EHR: yes    Medication History Completed By: Dunia Orona 10/6/2024 12:19 PM    PTA Med List   Medication Sig Last Dose    acetaminophen (TYLENOL) 500 MG tablet Take 2 tablets (1,000 mg) by mouth every 8 hours as needed for pain or fever.  at PRN    diphenhydrAMINE-acetaminophen (TYLENOL PM)  MG tablet Take 1 tablet by mouth nightly as needed for sleep.  at PRN    donepezil (ARICEPT) 5 MG tablet Take 1 tablet (5 mg) by mouth daily. 10/6/2024    ibuprofen (ADVIL/MOTRIN) 200 MG tablet Take 200 mg by mouth daily as needed for pain.  at PRN    latanoprost (XALATAN) 0.005 % ophthalmic solution Place 1 drop into both eyes At Bedtime 10/5/2024    multivitamin w/minerals (THERA-VIT-M) tablet Take 1 tablet by mouth daily. 10/6/2024    polyethylene glycol (MIRALAX) 17 GM/Dose powder Take 17 g by mouth daily as needed for constipation.  at PRN    simvastatin (ZOCOR) 20 MG tablet Take 20 mg by mouth At Bedtime 10/5/2024    spironolactone (ALDACTONE) 25 MG tablet Take 25 mg by mouth daily 10/6/2024

## 2024-10-06 NOTE — ED NOTES
Bed: ED11  Expected date:   Expected time:   Means of arrival:   Comments:  A 520 79 F fall mild confusion hr 81 99 % room air eta 1031

## 2024-10-06 NOTE — H&P
[facility]  History and Physical - Hospitalist Service       Date of Admission:  10/6/2024  PRIMARY CARE PROVIDER:    Jae Mendiola    Assessment & Plan   Connie Gerenwood is a 79 year old female with PMH of HTN, HLD, dementia who presented to the ED on 10/6/24 for evaluation after a fall at home.    Patient's son found patient on the ground this morning. He saw her last night and she was noted to be wearing the same clothing. Patient has underlying dementia and is unable to recall any details surrounding her fall or if she passed out. She has left shoulder pain and says she bumped her head, but denies any headache or any other pain. Denies chest pain, dyspnea, palpitations, fevers, chills, dysuria. She admits to visual hallucinations, unclear duration, per family relatively new today although she did have some increased confusion last hospitalization associated with narcotics. She has not received any narcotics today.    In the ED, VSS. CBC with WBC 14.0. BMP unremarkable. UA bland. CT head with new subdural hematoma along the left frontoparietal convexity with mild mass effect on the frontal and parietal lobe and 3 mm rightward midline shift. Left shoulder xray showed a non displaced fracture of the distal aspect of the left clavicle.    *Note, patient was recently hospitalized at [facility] 9/15/24 - 9/17/24 for fall with possible syncope. She sustained two acute left sided rib fractures, left distal radius fracture, scalp laceration. CT head NAD. Syncope was felt to possibly be vasovagal due to hot weather/dehydration.    Acute subdural hematoma  HTN  *CT head shows acute subdural hematoma along the left frontoparietal convexity measuring up to 17 mm with 3 mm rightward midline shift. No neuro deficits on exam.  - Neurosurgery consult  - Goal SBP <150, IV Hydralazine available PRN  - Given her recurrent falls, will stop Spironolactone and start Norvasc 2.5 mg daily  - Keep HOB >30  degrees  - Neurochecks and vitals q2h  - Repeat CT head in 6 hours    Left clavicle fracture  - Sling to LUE  - Acetaminophen as needed  - Routine orthopedic consult  - PT/OT eval before discharge    Recurrent falls  Elevated CK  *Unclear etiology of fall or if any associated syncope. Note that patient was recently hospitalized for a syncopal episode 2 weeks ago, felt to be vasovagal due to hot weather and dehydration. Patient is unable to recall the events surrounding her most recent fall.  *Patient lives alone in independent living, currently had no  services. Her family checks in on her regularly.  *Echo 9/17/24 with EF 60-65%, mild tricuspid regurgitation  - Total CK level mildly elevated at 1,186 -> Give 1 L IVF over 10 hours and will recheck level  - Stop PTA Spironolactone, start Norvasc 2.5 mg daily for BP control  - Continuous telemetry  - Check orthostatics once stable from neuro standpoint  - PT/OT evaluation prior to discharge  - SW consult for discharge planning  - Consider discharge with Zio patch    Increased confusion in the setting of dementia  *Oriented x 3 but forgetful at baseline. She is currently oriented to self and knew she was in the hospital, but not which. Disoriented to date. She has been having visual hallucinations today which may be related to her head injury and dementia.  *UA is bland. WBC 14 but no s/s infection, favor stress reaction.  - Continue PTA Aricept  - Delirium precautions    Recent left 7th and 8th rib fractures  *Diagnosed 9/15/24 after fall at home  - Lidoderm patch PRN  - Acetaminophen PRN    Recent distal left radius fracture  *Diagnosed 9/15/24 after a fall at home. Seen by orthopedics last admission and was placed in a splint, conservative management recommended.  - Continue OP orthopedics follow up    HLD  - Continue PTA Simvastatin    Clinically Significant Risk Factors Present on Admission                  # Hypertension: Noted on problem list   # Dementia:  "noted on problem list       # Overweight: Estimated body mass index is 26.15 kg/m  as calculated from the following:    Height as of this encounter: 1.676 m (5' 6\").    Weight as of this encounter: 73.5 kg (162 lb).                   Diet:  Regular  DVT Prophylaxis: Pneumatic Compression Devices  Roldan Catheter: Not present  Lines: None     Cardiac Monitoring: None  Code Status:  Full         Disposition Plan      Expected Discharge Date: 10/08/2024             Entered: Wanda Wild PA-C 10/06/2024, 1:46 PM     Medically Ready for Discharge: Anticipated in 2-4 Days       The patient's care was discussed with the Attending Physician, Dr. Blanco, Patient, and Patient's Family.    Wanda Wild PA-C  [facility]  Securely message with the Vocera Web Console (learn more here)      ______________________________________________________________________    Chief Complaint   Fall    History is obtained from the patient and EMR.      History of Present Illness   Connie Greenwood is a 79 year old female with PMH of HTN, HLD, dementia who presented to the ED on 10/6/24 for evaluation after a fall at home.    Patient's son found patient on the ground this morning. He saw her last night and she was noted to be wearing the same clothing. Patient has underlying dementia and is unable to recall any details surrounding her fall or if she passed out. She has left shoulder pain and says she bumped her head, but denies any headache or any other pain. Denies chest pain, dyspnea, palpitations, fevers, chills, dysuria. She admits to visual hallucinations, unclear duration, per family relatively new today although she did have some increased confusion last hospitalization associated with narcotics. She has not received any narcotics today.    In the ED, VSS. CBC with WBC 14.0. BMP unremarkable. UA bland. CT head with new subdural hematoma along the left frontoparietal convexity with mild mass effect on " the frontal and parietal lobe and 3 mm rightward midline shift. Left shoulder xray showed a non displaced fracture of the distal aspect of the left clavicle.    *Note, patient was recently hospitalized at [facility] 9/15/24 - 9/17/24 for fall with possible syncope. She sustained two acute left sided rib fractures, left distal radius fracture, scalp laceration. CT head NAD. Syncope was felt to possibly be vasovagal due to hot weather/dehydration.    Past Medical History    I have reviewed this patient's medical history and updated it with pertinent information if needed.   Past Medical History:   Diagnosis Date    Hypertension     Sleep apnea     Wears a mouth piece       Prior to Admission Medications   Prior to Admission Medications   Prescriptions Last Dose Informant Patient Reported? Taking?   acetaminophen (TYLENOL) 500 MG tablet  at PRN Self No Yes   Sig: Take 2 tablets (1,000 mg) by mouth every 8 hours as needed for pain or fever.   diphenhydrAMINE-acetaminophen (TYLENOL PM)  MG tablet  at PRN Self, Daughter Yes Yes   Sig: Take 1 tablet by mouth nightly as needed for sleep.   donepezil (ARICEPT) 5 MG tablet 10/6/2024 Self No Yes   Sig: Take 1 tablet (5 mg) by mouth daily.   ibuprofen (ADVIL/MOTRIN) 200 MG tablet  at PRN Self Yes Yes   Sig: Take 200 mg by mouth daily as needed for pain.   latanoprost (XALATAN) 0.005 % ophthalmic solution 10/5/2024 Self Yes Yes   Sig: Place 1 drop into both eyes At Bedtime   multivitamin w/minerals (THERA-VIT-M) tablet 10/6/2024 Self Yes Yes   Sig: Take 1 tablet by mouth daily.   polyethylene glycol (MIRALAX) 17 GM/Dose powder  at PRN Self Yes Yes   Sig: Take 17 g by mouth daily as needed for constipation.   simvastatin (ZOCOR) 20 MG tablet 10/5/2024 Self Yes Yes   Sig: Take 20 mg by mouth At Bedtime   spironolactone (ALDACTONE) 25 MG tablet 10/6/2024 Self Yes Yes   Sig: Take 25 mg by mouth daily      Facility-Administered Medications: None     Allergies   No Known  Allergies    Physical Exam   Vital Signs: Temp: 97.9  F (36.6  C) Temp src: Oral BP: (!) 148/74 Pulse: 72   Resp: 17 SpO2: 100 %      Weight: 162 lbs 0 oz    Constitutional: Awake, alert, cooperative, no apparent distress.    ENT: Normocephalic, without obvious abnormality, atraumatic. Normal sclera.    Neck: Supple, symmetrical, trachea midline  Pulmonary: No increased work of breathing, good air exchange, clear to auscultation bilaterally  Cardiovascular: Regular rate and rhythm  GI: Normal bowel sounds, soft, non-distended, non-tender.   Skin: Ecchymosis left clavicle, some slight swelling and bruising beneath the left eye  Neuro: Oriented to self, knew she was in the hospital but not which. Knew it was Sunday but disoriented to date. Moves all extremities spontaneously. Legs appear weak b/l but able to lift both off of the bed. No numbness. Finger-to-nose intact. No slurred speech or facial droop.  Psych:  Normal affect and mood. Describes some visual hallucinations.  Extremities: Normal muscle tone. No gross deformities noted. Calves non-tender b/l. No pitting edema b/l LE    Medical Decision Making       60 MINUTES SPENT BY ME on the date of service doing chart review, history, exam, documentation & further activities per the note.         Data   Data reviewed today: I reviewed all medications, new labs and imaging results over the last 24 hours. I personally reviewed no images or EKG's today.      I have personally reviewed the following data over the past 24 hrs:    14.0 (H)  \   12.7   / 252     140 101 20.0 /  99   4.0 25 0.74 \       Imaging results reviewed over the past 24 hrs:   Recent Results (from the past 24 hour(s))   CT Head w/o Contrast    Narrative    EXAM: CT HEAD W/O CONTRAST  LOCATION: [facility]  DATE: 10/6/2024    INDICATION: fall  COMPARISON: 9/15/2024.  TECHNIQUE: Routine CT Head without IV contrast. Multiplanar reformats. Dose reduction techniques were  used.    FINDINGS:  INTRACRANIAL CONTENTS: Left frontoparietal subdural hematoma measuring up to 17 mm in thickness, new compared to 9/15/2024. The hematoma is overall hypodense relative to the adjacent brain parenchyma, although there are more hyperdense layering blood   products posteriorly. Mild mass effect on the left frontal and parietal lobes with 3 mm rightward midline shift. No CT evidence of acute infarct. Mild presumed chronic small vessel ischemic changes. Mild generalized volume loss. No hydrocephalus. J Carlos   cisterna magna is incidentally noted.    VISUALIZED ORBITS/SINUSES/MASTOIDS: No intraorbital abnormality. No paranasal sinus mucosal disease. No middle ear or mastoid effusion.    BONES/SOFT TISSUES: No acute abnormality.      Impression    IMPRESSION:  1.  New subdural hematoma along the left frontoparietal convexity with mild mass effect on the frontal and parietal lobes and 3 mm rightward midline shift.    These findings were communicated by phone to Dr. Galeas on 10/6/2024 12:01 PM CDT.     XR Shoulder Left G/E 3 Views    Narrative    EXAM: XR SHOULDER LEFT G/E 3 VIEWS  LOCATION: [facility]  DATE: 10/6/2024    INDICATION: left arm pain  COMPARISON: None.      Impression    IMPRESSION: There is a nondisplaced fracture of the distal aspect of the left clavicle. The AC joint is intact with mild degenerative change. The glenohumeral joint is negative for fracture or dislocation.

## 2024-10-06 NOTE — ED PROVIDER NOTES
"  Emergency Department Note      History of Present Illness     Chief Complaint   Fall      HPI   Connie Greenwood is a very pleasant 79 year old female with history of hypertension, KING, dementia, among others presenting with fall.  Patient went to bed last night after an event with her family.  Her son found her lying on the floor of her closet.  Patient did not remember falling or how she ended up that.  She complains of pain in the left shoulder.  She does not remember hitting her head.  She does not take anticoagulation.  She denies pain elsewhere.  Family does note a history of worsening recent dementia. Of note, patient was admitted to this hospital on 9/15/2024 and discharged on 9/17/2024 after a fall with possible syncopal episode.  She also sustained rib fractures and left wrist fracture at that time.  She is still wearing a cast on the left arm.    Independent Historian   Son and Daughter as detailed above.    Review of External Notes   I personally reviewed notes from the patient's discharge summary dated  9/17/2024 . This provided me with information regarding patient's recent hospitalization.     I personally reviewed the patient's chart, including available medication list and available past medical history, past surgical history, family history, and social history.    Physical Exam     Patient Vitals for the past 24 hrs:   BP Temp Temp src Pulse Resp SpO2 Height Weight   10/06/24 1300 132/71 -- -- 71 14 99 % -- --   10/06/24 1054 (!) 148/74 97.9  F (36.6  C) Oral 72 17 100 % 1.676 m (5' 6\") 73.5 kg (162 lb)     Physical Exam  Vitals and nursing note reviewed.   HENT:      Head: Atraumatic.      Comments: No Vargas sign or raccoon eyes.  Eyes:      Extraocular Movements: Extraocular movements intact.   Cardiovascular:      Rate and Rhythm: Normal rate and regular rhythm.   Pulmonary:      Effort: Pulmonary effort is normal.      Breath sounds: Normal breath sounds.   Abdominal:      Palpations: Abdomen " is soft.      Tenderness: There is no abdominal tenderness.   Musculoskeletal:         General: Swelling, tenderness and signs of injury present.      Cervical back: Normal range of motion. No tenderness.      Comments: There is swelling, tenderness to the left shoulder superior to the clavicle and painful motion of the left shoulder   Skin:     General: Skin is warm and dry.      Findings: Bruising (Ecchymosis around superior aspect of left shoulder.  No skin tenting.) present. No lesion.   Neurological:      Mental Status: She is alert and oriented to person, place, and time.      Sensory: No sensory deficit.      Motor: No weakness.           Diagnostics     Lab Results   Labs Ordered and Resulted from Time of ED Arrival to Time of ED Departure   ROUTINE UA WITH MICROSCOPIC REFLEX TO CULTURE - Abnormal       Result Value    Color Urine Light Yellow      Appearance Urine Clear      Glucose Urine Negative      Bilirubin Urine Negative      Ketones Urine Trace (*)     Specific Gravity Urine 1.010      Blood Urine Negative      pH Urine 5.5      Protein Albumin Urine Negative      Urobilinogen Urine Normal      Nitrite Urine Negative      Leukocyte Esterase Urine Negative      Mucus Urine Present (*)     RBC Urine <1      WBC Urine 1      Squamous Epithelials Urine <1     CBC WITH PLATELETS AND DIFFERENTIAL - Abnormal    WBC Count 14.0 (*)     RBC Count 3.96      Hemoglobin 12.7      Hematocrit 37.1      MCV 94      MCH 32.1      MCHC 34.2      RDW 12.6      Platelet Count 252      % Neutrophils 85      % Lymphocytes 6      % Monocytes 9      % Eosinophils 0      % Basophils 0      % Immature Granulocytes 0      NRBCs per 100 WBC 0      Absolute Neutrophils 12.0 (*)     Absolute Lymphocytes 0.8      Absolute Monocytes 1.2      Absolute Eosinophils 0.0      Absolute Basophils 0.0      Absolute Immature Granulocytes 0.0      Absolute NRBCs 0.0     CK TOTAL - Abnormal    CK 1,186 (*)    BASIC METABOLIC PANEL - Normal     Sodium 140      Potassium 4.0      Chloride 101      Carbon Dioxide (CO2) 25      Anion Gap 14      Urea Nitrogen 20.0      Creatinine 0.74      GFR Estimate 82      Calcium 9.1      Glucose 99         Imaging   XR Shoulder Left G/E 3 Views   Final Result   IMPRESSION: There is a nondisplaced fracture of the distal aspect of the left clavicle. The AC joint is intact with mild degenerative change. The glenohumeral joint is negative for fracture or dislocation.      CT Head w/o Contrast   Final Result   IMPRESSION:   1.  New subdural hematoma along the left frontoparietal convexity with mild mass effect on the frontal and parietal lobes and 3 mm rightward midline shift.      These findings were communicated by phone to Dr. Galeas on 10/6/2024 12:01 PM CDT.         CT Head w/o Contrast    (Results Pending)       EKG   ECG results from 10/06/24   EKG 12-lead, tracing only     Value    Systolic Blood Pressure     Diastolic Blood Pressure     Ventricular Rate 68    Atrial Rate 68    RI Interval 154    QRS Duration 82        QTc 425    P Axis 46    R AXIS 13    T Axis 67    Interpretation ECG      Sinus rhythm  Nonspecific T wave abnormality  Abnormal ECG  When compared with ECG of 15-Sep-2024 19:10,  Premature supraventricular complexes are no longer Present  Nonspecific T wave abnormality now evident in Lateral leads           Independent Interpretation - See ED Course Below    ED Course      Medications Administered   Medications   amLODIPine (NORVASC) tablet 2.5 mg (has no administration in time range)   hydrALAZINE (APRESOLINE) injection 10 mg (has no administration in time range)   sodium chloride 0.9 % infusion ( Intravenous $New Bag 10/6/24 1440)       Procedures   Procedures   None performed    Discussion of Management - See ED Course Below    ED Course   Independent Interpretation / Discussion of Management / Repeat Assessments  ED Course as of 10/06/24 1528   Sun Oct 06, 2024   1211 CT Head w/o  Contrast  I independently interpreted the patient's non-contrast head CT; subdural         Additional Documentation  None    Medical Decision Making / Diagnosis     CMS Diagnoses: None    MIPS       None    MDM   Patient presenting with fall.  Vital signs are reassuring on arrival.  Unclear etiology, mechanical versus syncope given patient has no memory.  There is notable swelling and ecchymosis to the left shoulder suspicious for fracture.  We did obtain x-ray of the left shoulder which showed a nondisplaced clavicle fracture.  Patient was placed in a sling.  Given patient's age, concern for intracranial hemorrhage.  CT of the head was obtained and showed a 17 mm subdural hematoma with a small amount of midline shift.  Neurosurgery was consulted.  Plan for every 2 hours neurochecks, SBP less than 150, head of bed greater than 30.  Screening labs to evaluate for etiology of fall were unremarkable.  Patient has a mild leukocytosis, probably secondary to stress demargination.  Did note that patient has evidence of rhabdomyolysis, likely due to prolonged downtime.  Patient was admitted to hospitalist, [facility] status, for further evaluation and management.      Critical Care  The critical condition was: Intracranial Hemorrhage: SDH, EDH, SAH, IPH    Critical interventions performed or strongly considered: Arrange Definitive Care: IMC and neurosurgery    Critical care time was 10 minutes exclusive of time spent on separately billable procedures.     Disposition   The patient was admitted to the hospital.     Diagnosis     ICD-10-CM    1. Subdural hematoma (H)  S06.5XAA       2. Fall, initial encounter  W19.XXXA       3. Closed nondisplaced fracture of left clavicle, unspecified part of clavicle, initial encounter  S42.002A Wrist/Arm/Hand Bracing Supplies Order Sling; Left      4. Traumatic rhabdomyolysis, initial encounter (H)  T79.6XXA            Discharge Medications   New Prescriptions    No medications on file         Figueroa Galeas MD  10/06/24 5615

## 2024-10-06 NOTE — PROGRESS NOTES
RECEIVING UNIT ED HANDOFF REVIEW    ED Nurse Handoff Report was reviewed by: Kathy Griffin RN on October 6, 2024 at 5:36 PM

## 2024-10-06 NOTE — ED TRIAGE NOTES
Pt BIB EMS, from home (independently), after sustaining a fall (unknown time) and pt presents with new left shoulder pain.    When pt's son arrived to see her this morning, he found her on the floor. Fire arrived and assisted her off the floor. Pt does not remember the fall. She was wearing the same outfit she wore yesterday at a gala she attended with her family.      Over the last few years pt has had a small decline of memory (pt takes donepezil).     Correction: Of note, pt had a fall mid-September, and injured left wrist at that time. Pt has a cast.    Pt does not take blood thinners. A&O x3 with EMS. Pt denies head/neck pain.     .

## 2024-10-07 ENCOUNTER — APPOINTMENT (OUTPATIENT)
Dept: PHYSICAL THERAPY | Facility: CLINIC | Age: 79
DRG: 965 | End: 2024-10-07
Attending: HOSPITALIST
Payer: COMMERCIAL

## 2024-10-07 ENCOUNTER — APPOINTMENT (OUTPATIENT)
Dept: GENERAL RADIOLOGY | Facility: CLINIC | Age: 79
DRG: 965 | End: 2024-10-07
Attending: HOSPITALIST
Payer: COMMERCIAL

## 2024-10-07 LAB
ALBUMIN SERPL BCG-MCNC: 3.3 G/DL (ref 3.5–5.2)
ALP SERPL-CCNC: 94 U/L (ref 40–150)
ALT SERPL W P-5'-P-CCNC: 21 U/L (ref 0–50)
ANION GAP SERPL CALCULATED.3IONS-SCNC: 9 MMOL/L (ref 7–15)
AST SERPL W P-5'-P-CCNC: 49 U/L (ref 0–45)
BASOPHILS # BLD AUTO: 0 10E3/UL (ref 0–0.2)
BASOPHILS NFR BLD AUTO: 0 %
BILIRUB SERPL-MCNC: 0.7 MG/DL
BUN SERPL-MCNC: 14.8 MG/DL (ref 8–23)
CALCIUM SERPL-MCNC: 8.5 MG/DL (ref 8.8–10.4)
CHLORIDE SERPL-SCNC: 108 MMOL/L (ref 98–107)
CK SERPL-CCNC: 1190 U/L (ref 26–192)
CK SERPL-CCNC: 742 U/L (ref 26–192)
CREAT SERPL-MCNC: 0.64 MG/DL (ref 0.51–0.95)
EGFRCR SERPLBLD CKD-EPI 2021: 90 ML/MIN/1.73M2
EOSINOPHIL # BLD AUTO: 0 10E3/UL (ref 0–0.7)
EOSINOPHIL NFR BLD AUTO: 1 %
ERYTHROCYTE [DISTWIDTH] IN BLOOD BY AUTOMATED COUNT: 13.2 % (ref 10–15)
GLUCOSE SERPL-MCNC: 89 MG/DL (ref 70–99)
HCO3 SERPL-SCNC: 23 MMOL/L (ref 22–29)
HCT VFR BLD AUTO: 34.5 % (ref 35–47)
HGB BLD-MCNC: 11.9 G/DL (ref 11.7–15.7)
IMM GRANULOCYTES # BLD: 0 10E3/UL
IMM GRANULOCYTES NFR BLD: 0 %
LYMPHOCYTES # BLD AUTO: 1.4 10E3/UL (ref 0.8–5.3)
LYMPHOCYTES NFR BLD AUTO: 22 %
MCH RBC QN AUTO: 32.4 PG (ref 26.5–33)
MCHC RBC AUTO-ENTMCNC: 34.5 G/DL (ref 31.5–36.5)
MCV RBC AUTO: 94 FL (ref 78–100)
MONOCYTES # BLD AUTO: 0.7 10E3/UL (ref 0–1.3)
MONOCYTES NFR BLD AUTO: 11 %
NEUTROPHILS # BLD AUTO: 4.3 10E3/UL (ref 1.6–8.3)
NEUTROPHILS NFR BLD AUTO: 66 %
NRBC # BLD AUTO: 0 10E3/UL
NRBC BLD AUTO-RTO: 0 /100
PLATELET # BLD AUTO: 219 10E3/UL (ref 150–450)
POTASSIUM SERPL-SCNC: 4.1 MMOL/L (ref 3.4–5.3)
PROT SERPL-MCNC: 5.9 G/DL (ref 6.4–8.3)
RBC # BLD AUTO: 3.67 10E6/UL (ref 3.8–5.2)
SODIUM SERPL-SCNC: 140 MMOL/L (ref 135–145)
WBC # BLD AUTO: 6.5 10E3/UL (ref 4–11)

## 2024-10-07 PROCEDURE — 250N000013 HC RX MED GY IP 250 OP 250 PS 637: Performed by: PHYSICIAN ASSISTANT

## 2024-10-07 PROCEDURE — 85025 COMPLETE CBC W/AUTO DIFF WBC: CPT | Performed by: PHYSICIAN ASSISTANT

## 2024-10-07 PROCEDURE — 82040 ASSAY OF SERUM ALBUMIN: CPT | Performed by: PHYSICIAN ASSISTANT

## 2024-10-07 PROCEDURE — 97116 GAIT TRAINING THERAPY: CPT | Mod: GP

## 2024-10-07 PROCEDURE — 99232 SBSQ HOSP IP/OBS MODERATE 35: CPT | Performed by: HOSPITALIST

## 2024-10-07 PROCEDURE — 82550 ASSAY OF CK (CPK): CPT | Performed by: HOSPITALIST

## 2024-10-07 PROCEDURE — 36415 COLL VENOUS BLD VENIPUNCTURE: CPT | Performed by: HOSPITALIST

## 2024-10-07 PROCEDURE — 71046 X-RAY EXAM CHEST 2 VIEWS: CPT

## 2024-10-07 PROCEDURE — 97530 THERAPEUTIC ACTIVITIES: CPT | Mod: GP

## 2024-10-07 PROCEDURE — 82550 ASSAY OF CK (CPK): CPT | Performed by: PHYSICIAN ASSISTANT

## 2024-10-07 PROCEDURE — 97161 PT EVAL LOW COMPLEX 20 MIN: CPT | Mod: GP

## 2024-10-07 PROCEDURE — 120N000001 HC R&B MED SURG/OB

## 2024-10-07 PROCEDURE — 36415 COLL VENOUS BLD VENIPUNCTURE: CPT | Performed by: PHYSICIAN ASSISTANT

## 2024-10-07 RX ADMIN — LATANOPROST 1 DROP: 50 SOLUTION OPHTHALMIC at 21:55

## 2024-10-07 RX ADMIN — DONEPEZIL HYDROCHLORIDE 5 MG: 5 TABLET, FILM COATED ORAL at 21:55

## 2024-10-07 RX ADMIN — ACETAMINOPHEN 650 MG: 325 TABLET ORAL at 18:34

## 2024-10-07 RX ADMIN — LIDOCAINE 1 PATCH: 4 PATCH TOPICAL at 09:25

## 2024-10-07 ASSESSMENT — ACTIVITIES OF DAILY LIVING (ADL)
ADLS_ACUITY_SCORE: 54
ADLS_ACUITY_SCORE: 49
ADLS_ACUITY_SCORE: 54
ADLS_ACUITY_SCORE: 45
ADLS_ACUITY_SCORE: 50
ADLS_ACUITY_SCORE: 45
ADLS_ACUITY_SCORE: 49
ADLS_ACUITY_SCORE: 45
ADLS_ACUITY_SCORE: 54
ADLS_ACUITY_SCORE: 54
ADLS_ACUITY_SCORE: 49
ADLS_ACUITY_SCORE: 45
ADLS_ACUITY_SCORE: 54
ADLS_ACUITY_SCORE: 54
ADLS_ACUITY_SCORE: 45
ADLS_ACUITY_SCORE: 49
ADLS_ACUITY_SCORE: 54
ADLS_ACUITY_SCORE: 45
ADLS_ACUITY_SCORE: 49
ADLS_ACUITY_SCORE: 45
ADLS_ACUITY_SCORE: 54

## 2024-10-07 NOTE — PROGRESS NOTES
"   10/07/24 1800   Appointment Info   Signing Clinician's Name / Credentials (PT) Meka Barrera, PT, DPT   Rehab Comments (PT) L UE no lifting more than coffee cup, can use platform walker   Living Environment   People in Home alone   Current Living Arrangements independent living facility   Home Accessibility no concerns   Transportation Anticipated family or friend will provide   Living Environment Comments Pt lives in an independent living facility, no stairs to navigate (does have stairs at children's home). Pt has a walk in shower and tub showe, standard height toilets   Self-Care   Usual Activity Tolerance good   Current Activity Tolerance moderate   Regular Exercise Yes   Activity/Exercise Type walking   Equipment Currently Used at Home none   Fall history within last six months yes   Number of times patient has fallen within last six months 2   Activity/Exercise/Self-Care Comment Pt is typically INd with all ADL's at baseline   General Information   Onset of Illness/Injury or Date of Surgery 10/06/24   Referring Physician Franklin Blanco, DO   Patient/Family Therapy Goals Statement (PT) \"To go back home\"   Pertinent History of Current Problem (include personal factors and/or comorbidities that impact the POC) Pt is a very pleasant 79 year old female with history of hypertension, KING, dementia, among others presenting with fall.  Patient went to bed last night after an event with her family.  Her son found her lying on the floor of her closet.  Patient did not remember falling or how she ended up that.  She complains of pain in the left shoulder.  She does not remember hitting her head.  She does not take anticoagulation.  She denies pain elsewhere.  Family does note a history of worsening recent dementia. Of note, patient was admitted to this hospital on 9/15/2024 and discharged on 9/17/2024 after a fall with possible syncopal episode.  She also sustained rib fractures and left wrist fracture at that time. "  She is still wearing a cast on the left arm.   Existing Precautions/Restrictions fall;weight bearing;brace worn when out of bed  (L UE sling)   Weight-Bearing Status - LUE other (see comments)  (No lifting more than coffee cup, can use platform walker)   Weight-Bearing Status - RUE full weight-bearing   Weight-Bearing Status - LLE full weight-bearing   Weight-Bearing Status - RLE full weight-bearing   Cognition   Affect/Mental Status (Cognition) WFL;confused  (Forgetful)   Orientation Status (Cognition) oriented x 3   Follows Commands (Cognition) WFL   Pain Assessment   Patient Currently in Pain Yes, see Vital Sign flowsheet  (Pain in L UE)   Integumentary/Edema   Integumentary/Edema no deficits were identifed   Posture    Posture Forward head position;Protracted shoulders   Range of Motion (ROM)   Range of Motion ROM deficits secondary to pain;ROM deficits secondary to swelling;ROM deficits secondary to weakness   Strength (Manual Muscle Testing)   Strength (Manual Muscle Testing) Able to perform R SLR;Able to perform L SLR;Deficits observed during functional mobility   Bed Mobility   Comment, (Bed Mobility) Supine>sitting EOB completed w/ Mod Ax1   Transfers   Comment, (Transfers) Sit>stand completed w/ Min Ax1   Gait/Stairs (Locomotion)   Comment, (Gait/Stairs) Pt ambulated w/ Min Ax1   Balance   Balance other (describe)   Balance Comments Pt demonstrated good sitting balance, required HHA oor platform walker for dynamic balance   Sensory Examination   Sensory Perception patient reports no sensory changes   Coordination   Coordination no deficits were identified   Muscle Tone   Muscle Tone no deficits were identified   Clinical Impression   Criteria for Skilled Therapeutic Intervention Yes, treatment indicated   PT Diagnosis (PT) Impaired functioanl mobility   Influenced by the following impairments Weakness, impaired balance, increased pain, impaired activity tolerance   Functional limitations due to  impairments Impaired gait and inability to complete ADL's   Clinical Presentation (PT Evaluation Complexity) stable   Clinical Presentation Rationale Clinical judgment   Clinical Decision Making (Complexity) low complexity   Planned Therapy Interventions (PT) balance training;bed mobility training;gait training;home exercise program;manual therapy techniques;motor coordination training;neuromuscular re-education;patient/family education;postural re-education;ROM (range of motion);stair training;strengthening;stretching;transfer training;progressive activity/exercise;risk factor education;home program guidelines   Risk & Benefits of therapy have been explained evaluation/treatment results reviewed;care plan/treatment goals reviewed;risks/benefits reviewed;current/potential barriers reviewed;participants voiced agreement with care plan;participants included;patient;son   PT Total Evaluation Time   PT Eval, Low Complexity Minutes (81424) 10   Physical Therapy Goals   PT Frequency Daily   PT Predicted Duration/Target Date for Goal Attainment 10/14/24   PT Goals Bed Mobility;Transfers;Gait   PT: Bed Mobility Independent;Supine to/from sit;Rolling;Bridging;Within precautions   PT: Transfers Modified independent;Sit to/from stand;Bed to/from chair;Assistive device;Within precautions   PT: Gait Supervision/stand-by assist;Assistive device;Greater than 200 feet;Within precautions   Interventions   Interventions Quick Adds Gait Training;Therapeutic Activity   Therapeutic Activity   Therapeutic Activities: dynamic activities to improve functional performance Minutes (52594) 15   Treatment Detail/Skilled Intervention Eval completed and treatment indicated. Pt and son educated on precautions and ability to use platform walker as needed. Pt also educated on proper sling positioning for support. Supine>sitting EOB completed w/ Min Ax1, cues given for R UE support on bed rails to assist. Sit>stand completed w/ Min AX1, pt cued to  push up from bed w/ R UE for stability. Post-ambulation, pt and son educated on discharge planning, pt in agreement with rehab. Sit>supine completed w/ Mod Ax1, cues given for postioning in bed. Pt left supine in bed with all needs in reach and bed alarm on, RN notified,   Gait Training   Gait Training Minutes (46628) 10   Treatment Detail/Skilled Intervention Pt ambulated w/ R HHA and Min Ax1 initally, demonstrated slight LOB while ambulating, cued for increased step length and upright posture throughout. Pt then ambulated w/ platform walker on L side, cued for technique, improved balance noted with platform walker. Pt and son educated on usage of fWW for all functioanl mobility at this time.   Distance in Feet ~50 ft   PT Discharge Planning   PT Plan Progress ambulation w/ platform walker vs without an AD, repeated sit>stands, formal balance assessment   PT Discharge Recommendation (DC Rec) [facility]-Motivated patient will benefit from intensive, interdisciplinary therapy.  Anticipate will be able to tolerate 3 hours of therapy per day   PT Rationale for DC Rec Pt is moving significantly below baseline mobility, currently requiring Ax1 for all functiaonl mobility and benefitting from usage of platform walker for all functional mobility. Pt is typically INd with all ADL's and mobility at baseline without an AD. Recommend ARU upon discharge to further address deficits prior to returning home, if pt unable to go to ARU, would recommend TCU. Pt has supportive family and is motivated to improve.   PT Brief overview of current status Min Ax1 w/ platform walker; Goals of therapy will be to address safe mobility and make recs for d/c to next level of care. Pt and RN will continue to follow all falls risk precautions as documented by RN staff while hospitalized.   Total Session Time   Timed Code Treatment Minutes 25   Total Session Time (sum of timed and untimed services) 35

## 2024-10-07 NOTE — PLAN OF CARE
Reason for Admission: left frontoparietal SDH w/ left clavicle fracture    Cognitive/Mentation: A/Ox 3, disoriented to situation. Forgetful and poor new information recall.   Neuros/CMS: Intact ex right eye dysconjugate, RLE 3/5 in strength, LUE 2/5 due to pain from fractures, RLE 3/5 and LLE 4/5. Left arm stays in sling for comfort; okay to remove per ortho. Denies numbness/tingling, able to wiggle fingers in left arm cast.   VS: VSS. Tele < 150 throughout shift.   Tele: NSR.  /GI: Incontinent. Last BM today, 10/7. Purewick when in bed.    Pulmonary: LS diminished, but clear.  Pain: Denies pain when at rest. Reports sharp aching pain with movement and left shoulder is tender to touch. Able to turn onto right side with little difficulty. PRN Tylenol given x1      Drains/Lines: PIV SL  Skin: Blanchable redness to coccyx/buttocks. Large bruise to left anterior and posterior shoulder, scattered bruising otherwise.   Activity: Remained in bed throughout shift. PT/OT ordered for tomorrow. Turns/repo'd with assist x2.  Diet: Regular with thin liquids. Takes pills whole with water.     Therapies recs: TBD  Discharge: Pending recs, SW following    Aggression Stoplight Tool: Yellow for pleasant confusion    End of shift summary: Patient worked with PT today, up with minimal assist/SBA with sling on LUE. Continues to be forgetful throughout the shift. Family supportive at bedside.

## 2024-10-07 NOTE — PROGRESS NOTES
[facility]    Hospitalist Progress Note    Interval History   Patient is awake and alert.  Family at bedside.  Generalized weakness.  Forgetful.  Does not recall anything about her fall or how it happened.  Does endorse pain with her left shoulder and arm.    -Data reviewed today: I reviewed all new labs and imaging results over the last 24 hours. I personally reviewed the head CT image(s) showing stable and similar mixed density left frontoparietal convexity subdural hematoma measuring up to 17 mm in maximum thickness.  Stable left to right 3 mm midline shift. .    Physical Exam   Temp: 99  F (37.2  C) Temp src: Oral BP: 109/59 Pulse: 71   Resp: 14 SpO2: 97 % O2 Device: None (Room air)    Vitals:    10/06/24 1054 10/07/24 0806   Weight: 73.5 kg (162 lb) 76.3 kg (168 lb 3.4 oz)     Vital Signs with Ranges  Temp:  [97.5  F (36.4  C)-99  F (37.2  C)] 99  F (37.2  C)  Pulse:  [63-76] 71  Resp:  [10-18] 14  BP: (106-141)/(59-76) 109/59  SpO2:  [94 %-100 %] 97 %  No intake/output data recorded.    Physical Exam  Constitutional:       Appearance: She is ill-appearing.   Eyes:      Comments: Disconjugate gaze   Cardiovascular:      Rate and Rhythm: Normal rate and regular rhythm.      Pulses: Normal pulses.      Heart sounds: Normal heart sounds.   Pulmonary:      Effort: Pulmonary effort is normal. No respiratory distress.      Breath sounds: Normal breath sounds.   Abdominal:      General: Abdomen is flat. Bowel sounds are normal. There is no distension.      Tenderness: There is no abdominal tenderness. There is no guarding.   Musculoskeletal:      Comments: Left arm in sling   Skin:     General: Skin is warm and dry.   Neurological:      General: No focal deficit present.      Comments: Generalized weakness with 3 x 5 strength in lower extremities.  Gait not assessed.  No cranial nerve deficits seen.           Medications   Current Facility-Administered Medications   Medication Dose Route  Frequency Provider Last Rate Last Admin     Current Facility-Administered Medications   Medication Dose Route Frequency Provider Last Rate Last Admin    amLODIPine (NORVASC) tablet 2.5 mg  2.5 mg Oral Daily Maria Del Carmen Ramos MD   2.5 mg at 10/06/24 1552    donepezil (ARICEPT) tablet 5 mg  5 mg Oral Daily Wanda Wild PA-C        latanoprost (XALATAN) 0.005 % ophthalmic solution 1 drop  1 drop Both Eyes At Bedtime Wanda Wild PA-C   1 drop at 10/06/24 2207    Lidocaine (LIDOCARE) 4 % Patch 1 patch  1 patch Transdermal Q24H Wanda Wild PA-C   1 patch at 10/07/24 0925    [Held by provider] simvastatin (ZOCOR) tablet 20 mg  20 mg Oral At Bedtime Wanda Wild PA-C        sodium chloride (PF) 0.9% PF flush 3 mL  3 mL Intracatheter Q8H Wanda Wild PA-C           Data   Recent Labs   Lab 10/07/24  0728 10/06/24  1106   WBC 6.5 14.0*   HGB 11.9 12.7   MCV 94 94    252    140   POTASSIUM 4.1 4.0   CHLORIDE 108* 101   CO2 23 25   BUN 14.8 20.0   CR 0.64 0.74   ANIONGAP 9 14   ISAURO 8.5* 9.1   GLC 89 99   ALBUMIN 3.3*  --    PROTTOTAL 5.9*  --    BILITOTAL 0.7  --    ALKPHOS 94  --    ALT 21  --    AST 49*  --        Recent Results (from the past 24 hour(s))   CT Head w/o Contrast    Narrative    EXAM: CT HEAD W/O CONTRAST  LOCATION: [facility]  DATE: 10/6/2024    INDICATION: SDH  COMPARISON: CT head 10/6/2024  TECHNIQUE: Routine CT Head without IV contrast. Multiplanar reformats. Dose reduction techniques were used.    FINDINGS:  INTRACRANIAL CONTENTS: Stable and similar left frontoparietal subdural hematoma measures up to 17 mm in maximum thickness. The collection is mixed density with some hyperdense layering products in its dependent portion similar to prior. Mild mass effect,   left to right midline shift 3 mm similar to prior. No CT evidence of acute infarct. Mild presumed chronic small vessel ischemic changes. Mild generalized volume loss.  No hydrocephalus.     VISUALIZED ORBITS/SINUSES/MASTOIDS: No intraorbital abnormality. No paranasal sinus mucosal disease. No middle ear or mastoid effusion.    BONES/SOFT TISSUES: No acute abnormality.      Impression    IMPRESSION:  1.  Stable and similar mixed density left frontoparietal convexity subdural hematoma measures up to 17 mm in maximum thickness.  2.  Stable left-to-right 3 mm midline shift.         Assessment & Plan      Connie Greenwood is a 79 year old female with PMH of HTN, HLD, dementia who presented to the ED on 10/6/24 for evaluation after a fall at home.     Patient's son found patient on the ground this morning. He saw her last night and she was noted to be wearing the same clothing. Patient has underlying dementia and is unable to recall any details surrounding her fall or if she passed out. She has left shoulder pain and says she bumped her head, but denies any headache or any other pain. Denies chest pain, dyspnea, palpitations, fevers, chills, dysuria. She admits to visual hallucinations, unclear duration, per family relatively new today although she did have some increased confusion last hospitalization associated with narcotics. She has not received any narcotics today.     In the ED, VSS. CBC with WBC 14.0. BMP unremarkable. UA bland. CT head with new subdural hematoma along the left frontoparietal convexity with mild mass effect on the frontal and parietal lobe and 3 mm rightward midline shift. Left shoulder xray showed a non displaced fracture of the distal aspect of the left clavicle.     *Note, patient was recently hospitalized at [facility] 9/15/24 - 9/17/24 for fall with possible syncope. She sustained two acute left sided rib fractures, left distal radius fracture, scalp laceration. CT head NAD. Syncope was felt to possibly be vasovagal due to hot weather/dehydration.     Acute subdural hematoma  HTN  *CT head shows acute subdural hematoma along the left frontoparietal convexity  measuring up to 17 mm with 3 mm rightward midline shift. No neuro deficits on exam.  - Neurosurgery consult.  Appreciate recommendations  - Goal SBP <150, IV Hydralazine available PRN  - Given her recurrent falls, will stop Spironolactone and start Norvasc 2.5 mg daily  - Keep HOB >30 degrees  - Neurochecks and vitals q2h  - Repeat CT head in 6 hours continue to remain stable.  Conservative management per neurosurgery.  -Follow-up in neurosurgery clinic in 4 weeks with repeat head CT.  Also repeat head CT with any worsening symptoms including worsening right-sided weakness or mental status changes.  No blood thinners including aspirin or ibuprofen until follow-up.  Neurosurgery signed off.     Left clavicle fracture  - Sling to LUE  - Acetaminophen as needed  - Routine orthopedic consult  - PT/OT eval before discharge     Recurrent falls  Rhabdomyolysis  *Unclear etiology of fall or if any associated syncope. Note that patient was recently hospitalized for a syncopal episode 2 weeks ago, felt to be vasovagal due to hot weather and dehydration. Patient is unable to recall the events surrounding her most recent fall.  *Patient lives alone in independent living, currently had no  services. Her family checks in on her regularly.  *Echo 9/17/24 with EF 60-65%, mild tricuspid regurgitation  - Total CK level mildly elevated at 1,186 -> received 1 L IV fluid overnight.  Stopped IV fluids due to supply issues.  Recheck CK at 1190.  Continue to hold fluids for now and recheck in the evening.  If it is still elevated will consider restarting IV fluids.  Will encourage oral intake.  - Stop PTA Spironolactone, start Norvasc 2.5 mg daily for BP control  -Hold for SBP less than 120  - Continuous telemetry  - Check orthostatics once stable from neuro standpoint  - PT/OT evaluation prior to discharge  - SW consult for discharge planning  - Consider discharge with Zio patch     Increased confusion in the setting of  "dementia  *Oriented x 3 but forgetful at baseline. She is currently oriented to self and knew she was in the hospital, but not which. Disoriented to date. She has been having visual hallucinations for the past 4 weeks which may be related to her head injury and dementia.  *UA is bland. WBC 14 but no s/s infection, favor stress reaction.  - Continue PTA Aricept  - Delirium precautions     Recent left 7th and 8th rib fractures  *Diagnosed 9/15/24 after fall at home  - Lidoderm patch PRN  - Acetaminophen PRN     Recent distal left radius fracture  *Diagnosed 9/15/24 after a fall at home. Seen by orthopedics last admission and was placed in a splint, conservative management recommended.  - Continue OP orthopedics follow up     HLD  - Continue PTA Simvastatin       Clinically Significant Risk Factors          # Hypocalcemia: Lowest Ca = 8.5 mg/dL in last 2 days, will monitor and replace as appropriate     # Hypoalbuminemia: Lowest albumin = 3.3 g/dL at 10/7/2024  7:28 AM, will monitor as appropriate     # Hypertension: Noted on problem list    # Dementia: noted on problem list        # Overweight: Estimated body mass index is 27.15 kg/m  as calculated from the following:    Height as of this encounter: 1.676 m (5' 6\").    Weight as of this encounter: 76.3 kg (168 lb 3.4 oz)., PRESENT ON ADMISSION             DVT Prophylaxis: Pneumatic Compression Devices  Code Status: Full Code  Medically Ready for Discharge: Anticipated in 2-4 Days    Greater than 35 minutes spent on documentation review, imaging review, lab review, examining the patient and discussing care with family at bedside    Maria Del Carmen Ramos MD, MD  [phone](p)   "

## 2024-10-07 NOTE — PROGRESS NOTES
Neurosurgery progress note    Patient states she is feeling well today.  Denies headache.  Denies weakness in her right arm.  Left arm is in a sling.  Repeat head CT scan yesterday was stable.    Exam    Alert and in  no acute distress  Moving right upper extremity and bilateral lower extremities appropriate strength, left upper extremity in a sling  Finger-nose slow and accurate on the right, not tested on the left  Negative pronator drift on the right, not tested on the left  Extraocular movements intact  Pupils equal and reactive      Assessment    Status post fall    1.  Stable and similar mixed density left frontoparietal convexity subdural hematoma measures up to 17 mm in maximum thickness.  2.  Stable left-to-right 3 mm midline shift.    Plan    Recommend follow-up with neurosurgery clinic in 4 weeks with repeat head CT scan.  If the patient experiences any worsening symptoms in that time particular right-sided weakness, or mental status changes, she should contact our clinic or present to the emergency department for repeat head CT scan.    Patient should not take any blood thinning medications, ibuprofen or aspirin, until seen in follow-up in neurosurgery clinic.    Our clinic will help arrange follow-up    Neurosurgery will sign off    Reviewed with Dr. Lovelace

## 2024-10-07 NOTE — PROGRESS NOTES
[facility]    Neurosurgery Consultation     Date of Admission:  10/6/2024  Date of Consult (When I saw the patient): 10/06/24      ASSESSMENT/PLAN:     # Acute subdural hematoma   # Fall in home        Neurosurgery plan:   --Repeat head CT in six hours (ordered)   --HOB > 30 degrees   --SBP < 150 mmHg  --Neuro checks q2h  --No ASA or anti-inflammatory products   --Neurosurgery will formally consult this afternoon        Case reviewed with Dr. Lovelace   Thank you for having us be involved in the care of Connie Greenwood.       Aleksandra Gomez PA-C  [facility] Neurosurgery    Please page on-call service with additional questions or concerns.     ______________________________________________________________________    HPI:    Connie Greenwood is a pleasant 79 year old female who presented to [facility] Emergency Department this morning after a fall in her home. She is not on oral anticoagulation.     Neurosurgery consultation was requested for evaluation of subdural hematoma noted on imaging.     Ms. Greenwood was seen this afternoon with her son at bedside. They explain she must have fallen in closet at some point overnight as son and granddaughter found her down when they visited her this morning. She was confused but alert when they found her, subsequently bringing her into the ED. Connie does not recall the fall. She reports an occasional history of falls. She denies any headaches. She does have some left shoulder pain that she relates to clavicle fracture. They also mention some concerns of hallucinations that haven been going on for weeks-months. There have been slow known memory changes over recent months. There are no bowel or bladder changes.       Past Medical History:   Diagnosis Date    Hypertension     Sleep apnea     Wears a mouth piece       Past Surgical History:   Procedure Laterality Date    CHOLECYSTECTOMY      DAVINCI PELVIC PROCEDURE N/A 3/31/2023    Procedure: Xi Robotic  "Assisted Laparoscopic Sacral Colpopexy, Robotic Laparoscopic Supracervical Hysterectomy with Bilateral Salpingectomy-Oopherectomy, Lysis of Adhesions,  Robotic Laparoscopic Abdominal Enterocele Repair, Cystoscopy, and Midurethral Sling;  Surgeon: Sheryl Patiño MD;  Location: [facility] OR       No Known Allergies    Social History     Tobacco Use    Smoking status: Never    Smokeless tobacco: Never   Substance Use Topics    Alcohol use: Yes     Comment: occ       No family history on file.    Scheduled Medications  Current Facility-Administered Medications   Medication Dose Route Frequency Provider Last Rate Last Admin    amLODIPine (NORVASC) tablet 2.5 mg  2.5 mg Oral Daily Wanda Wild PA-C   2.5 mg at 10/06/24 1552    [START ON 10/7/2024] donepezil (ARICEPT) tablet 5 mg  5 mg Oral Daily Wanda Wild PA-C        latanoprost (XALATAN) 0.005 % ophthalmic solution 1 drop  1 drop Both Eyes At Bedtime Wanda Wild PA-C        [START ON 10/7/2024] Lidocaine (LIDOCARE) 4 % Patch 1 patch  1 patch Transdermal Q24H Wanda Wild PA-C        simvastatin (ZOCOR) tablet 20 mg  20 mg Oral At Bedtime Wanda Wild PA-C        sodium chloride (PF) 0.9% PF flush 3 mL  3 mL Intracatheter Q8H Wanda Wild PA-C           Home Medications  Reviewed. No blood thinners.       ROS: 10 point ROS neg other than the symptoms noted above in the HPI.    Vitals:  BP (!) 141/60 (BP Location: Right arm)   Pulse 75   Temp 97.9  F (36.6  C) (Oral)   Resp 16   Ht 1.676 m (5' 6\")   Wt 73.5 kg (162 lb)   SpO2 100%   BMI 26.15 kg/m    Body mass index is 26.15 kg/m .  No intake/output data recorded.    EXAM:   Pt in bed ED 11. She appears comfortable and in no apparent distress, moving all extremities.   CN II-XII intact, alert and appropriate with conversation and following commands. No facial asymmetry, tongue protrudes midline.   Oriented to self and location and year.  Finger to nose slow and " accurate.   Rapid hand movements intact.   Absent for upper extremity drift.   Bilateral upper extremities 5/5 strength. Sensation intact throughout.   Bilateral lower extremities 5/5 strength.Sensation intact throughout.   Negative for clonus. Negative babinski.        IMAGING:   *I personally reviewed images     Head CT WO   IMPRESSION:  1.  New subdural hematoma along the left frontoparietal convexity with mild mass effect on the frontal and parietal lobes and 3 mm rightward midline shift.     These findings were communicated by phone to Dr. Galeas on 10/6/2024 12:01 PM CDT       Available labs at time of consult:   Reviewed- hgb 12.7, platelet 252K

## 2024-10-07 NOTE — PLAN OF CARE
Reason for Admission: L. Fronto-Parietal SDH  L. Clavicular Fx, Recent L. Wrist and R. 7th - 8th Rib Fx's    Cognitive/Mentation: A/Ox3-4, disoriented to situation at times, forgetful  Neuros/CMS: Stable with LUE (2/5 d/t recent injury, cast and sling in place) and RLE (3/5) weakness, R. Eye dysconjugate at baseline, mild aphasia  VS: Stable on RA, keep SBP <150  Tele: NSR  GI/: Last BM 10/6, continent, PW in place  Pulmonary: LS clear, equal bilaterally  Pain: Denies    Drains/Lines: PIV SL  Skin: Scattered bruising present, blanchable redness to sacrum - mepilex applied  Activity: Ax2 with lift, not OOB this shift  Diet: Regular with thin liquids, takes medications whole    Aggression Stoplight Tool: Green  Plan: NSG following, repeat CT stable, PT/OT to evaluate, Ortho Surgery and SW consulted  Discharge: Pending medical readiness

## 2024-10-07 NOTE — PLAN OF CARE
Orthopedic Surgery    Connie Greenwood is a 80 y/o female with a left distal clavicle fracture. Xrays reviewed. Rec'd non-op treatment. Sling for comfort. ROM as tolerated. WBAT.    Patient may follow up outpatient 2-3 weeks from injury. Please call to schedule appointment.     Dr. Abdullahi's care coordinator is Cecile Dykes. Please contact her at [phone] to schedule an appointment.      Dr. Abdullahi sees patients at 2 clinic locations:  [facility] [address] [address]  [facility]   [address] , [address], [place] [address]    Miguel Abdullahi MD  [facility]

## 2024-10-07 NOTE — CONSULTS
[facility]    Orthopedic Consultation    Connie Greenwood MRN# [id number]   Age: 79 year old YOB: 1945     Date of Admission: 10/6/2024    Reason for consult: Left clavicle fracture       Requesting physician: Wanda Wild PA-C       Level of consult: One-time consult to assist in determining a diagnosis and to recommend an appropriate treatment plan           Assessment and Plan:   Assessment:   Acute, closed, minimally displaced, comminuted, extraarticular appearing left distal clavicle fracture  Acute subdural hematoma  Subacute, closed, comminuted, impacted, angulated left distal radius and distal ulna fractures      Plan:   The patient's history and clinical/diagnostic findings were reviewed with the on-call orthopedic trauma surgeon, Dr. Miguel Abdullahi. The patient has a history of recurrent falls and was found on the ground by her son on 10/6/24. Work-up here significant for an acute left distal clavicle fracture and a subdural hematoma (neurosurgery following). Patient also has a history of left distal radius and left distal ulna fractures (DOI: 9/15/24) that are being managed nonoperatively with immobilization. Patient was last seen by Dr. Chad You at [facility] on 10/4/24 and was advised to remain in her cast for the next 4 weeks with follow up at that time. Currently, the LUE is NVI. Reviewed that the left clavicle fracture should respond well to nonoperative management and would expect healing in the next 6-8 weeks. Patient understanding and agreeable to the following plan:    -Remain in left short arm cast at all times. This must remain clean and dry.  -Sling for comfort. Okay to remove for hygiene, skin checks, to apply cold compresses, to allow gentle traction by gravity to the fracture to aid in better alignment, and to complete ROM as below.   -No lifting more than a full coffee cup with the LUE. Okay for use of platform walker if  needed.  -Okay to progress to Codman's exercises of the left shoulder as pain allows, likely in the next several days to a week. Encourage frequent ROM of the elbow and digits to prevent stiffness.  -Physical and occupational therapy consults.  -Consider SW/care management consult for discharge planning.  -Pain regimen per medicine team. Encourage use of cold compresses and elevation of the left wrist.  -NV checks Q4H LUE while inpatient.  -Follow up with Dr. Chad You (message sent to Dr. You with update today) at [facility] in 4 weeks for repeat x-rays and reevaluation of the left clavicle and left wrist. Please call [phone] to schedule. All questions answered. Ortho trauma to sign off this hospitalization.    Please contact orthopedic trauma team if any questions or concerns arise.           Chief Complaint:   Acute left clavicle fracture         History of Present Illness:   Medical history obtained via chart review and discussion with the patient. Connie Greenwood is an extremely pleasant 79 year old right-hand dominant female with past medical history of HTN, HLD, dementia, and recurrent falls who was admitted on 10/6/24 after she was found down by her son earlier that day and the patient was found to have a subdural hematoma and acute left distal clavicle fracture. Patient notably was hospitalized at [facility] from 9/15/24-9/17/24 for left-sided rib fractures, left distal radius and distal ulna fractures, and a scalp laceration. She has been following with Dr. Chad You at [facility] and last saw his team on 10/4/24, at which time she was advised to remain in her left short arm cast for 4 more weeks.     Patient is unable to recall the events of her recent fall, but per chart review she was found down by her son on the morning of 10/6/24 and was wearing the same clothes as the night before. She noted pain to her left shoulder and that she bumped her head. She was taken  to [facility] ED and was found to have a left-sided SDH and left distal clavicle fracture. Patient was admitted to the neuro unit and the neurosurgery team is following. Currently, she notes minimal pain at rest to the left shoulder but that the application of pressure and movement increase her pain substantially. There is bruising to the area. Patient states that her left wrist feels good in the cast and has no issues. Denies numbness and tingling to the LUE. Patient does not recall prior injuries or surgeries to the left shoulder, but states she may have broken her right collarbone in the past. Patient lives in a house. She denies the use of a walker at baseline. No documented anticoagulants. She is tolerating PO intake.          Past Medical History:     Past Medical History:   Diagnosis Date    Hypertension     Sleep apnea     Wears a mouth piece             Past Surgical History:     Past Surgical History:   Procedure Laterality Date    CHOLECYSTECTOMY      DAVINCI PELVIC PROCEDURE N/A 3/31/2023    Procedure: Xi Robotic Assisted Laparoscopic Sacral Colpopexy, Robotic Laparoscopic Supracervical Hysterectomy with Bilateral Salpingectomy-Oopherectomy, Lysis of Adhesions,  Robotic Laparoscopic Abdominal Enterocele Repair, Cystoscopy, and Midurethral Sling;  Surgeon: Sheryl Patiño MD;  Location:  OR             Social History:     Social History     Tobacco Use    Smoking status: Never    Smokeless tobacco: Never   Substance Use Topics    Alcohol use: Yes     Comment: occ             Family History:   No family history on file.          Immunizations:     VACCINE/DOSE   Diptheria   DPT   DTAP   HBIG   Hepatitis A   Hepatitis B   HIB   Influenza   Measles   Meningococcal   MMR   Mumps   Pneumococcal   Polio   Rubella   Small Pox   TDAP   Varicella   Zoster             Allergies:   No Known Allergies          Medications:     Current Facility-Administered Medications   Medication Dose Route Frequency Provider  Last Rate Last Admin    acetaminophen (TYLENOL) tablet 650 mg  650 mg Oral Q4H PRN Wanda Wild PA-C   650 mg at 10/06/24 1900    Or    acetaminophen (TYLENOL) Suppository 650 mg  650 mg Rectal Q4H PRN Wanda Wild PA-C        amLODIPine (NORVASC) tablet 2.5 mg  2.5 mg Oral Daily Wanda Wild PA-C   2.5 mg at 10/06/24 1552    calcium carbonate (TUMS) chewable tablet 1,000 mg  1,000 mg Oral 4x Daily PRN Wanda Wild PA-C        donepezil (ARICEPT) tablet 5 mg  5 mg Oral Daily Wanda Wild PA-C        hydrALAZINE (APRESOLINE) injection 10 mg  10 mg Intravenous Q6H PRN Wanda Wild PA-C        latanoprost (XALATAN) 0.005 % ophthalmic solution 1 drop  1 drop Both Eyes At Bedtime Wanda Wild PA-C   1 drop at 10/06/24 2207    Lidocaine (LIDOCARE) 4 % Patch 1 patch  1 patch Transdermal Q24H Wanda Wild PA-C        lidocaine (LMX4) cream   Topical Q1H PRN Wanda Wild PA-C        lidocaine 1 % 0.1-1 mL  0.1-1 mL Other Q1H PRN Wanda Wild PA-C        ondansetron (ZOFRAN ODT) ODT tab 4 mg  4 mg Oral Q6H PRN Wanda Wild PA-C        Or    ondansetron (ZOFRAN) injection 4 mg  4 mg Intravenous Q6H PRN Wanda Wild PA-C        senna-docusate (SENOKOT-S/PERICOLACE) 8.6-50 MG per tablet 1 tablet  1 tablet Oral BID PRN Wanda Wild PA-C        Or    senna-docusate (SENOKOT-S/PERICOLACE) 8.6-50 MG per tablet 2 tablet  2 tablet Oral BID PRN Wanda Wild PA-C        [Held by provider] simvastatin (ZOCOR) tablet 20 mg  20 mg Oral At Bedtime Meigs, Wanda E, PA-C        sodium chloride (PF) 0.9% PF flush 3 mL  3 mL Intracatheter Q8H Wanda Wild PA-C        sodium chloride (PF) 0.9% PF flush 3 mL  3 mL Intracatheter q1 min prn Wanda Wild PA-C                 Review of Systems:   CV: NEGATIVE for chest pain, palpitations or peripheral edema  C: NEGATIVE for fever, chills, change in weight  E/M: NEGATIVE  "for ear, mouth and throat problems  R: NEGATIVE for significant cough or SOB          Physical Exam:   All vitals have been reviewed  Patient Vitals for the past 24 hrs:   BP Temp Temp src Pulse Resp SpO2 Height Weight   10/07/24 0806 -- -- -- -- -- -- -- 76.3 kg (168 lb 3.4 oz)   10/07/24 0739 122/69 98.7  F (37.1  C) Oral 74 18 98 % -- --   10/07/24 0558 117/64 98.1  F (36.7  C) Oral 63 14 94 % -- --   10/07/24 0409 119/65 97.8  F (36.6  C) Oral 65 14 98 % -- --   10/07/24 0206 119/59 -- -- 76 14 100 % -- --   10/07/24 0010 115/76 -- -- 73 10 100 % -- --   10/06/24 2200 121/72 -- -- -- 12 -- -- --   10/06/24 2000 -- -- -- 76 11 100 % -- --   10/1945 -- 97.5  F (36.4  C) Oral -- -- -- -- --   10/06/24 1549 (!) 141/60 -- -- 75 16 100 % -- --   10/06/24 1300 132/71 -- -- 71 14 99 % -- --   10/06/24 1054 (!) 148/74 97.9  F (36.6  C) Oral 72 17 100 % 1.676 m (5' 6\") 73.5 kg (162 lb)     No intake or output data in the 24 hours ending 10/07/24 0921    Constitutional: Pleasant, alert, appropriate, following commands, forgetful at times in the setting of dementia. NAD.  HEENT: Head atraumatic normocephalic. Pupils equal round and reactive.  Respiratory: Unlabored breathing no audible wheeze  Cardiovascular: Regular rate and rhythm per pulses.  GI: Abdomen is non-distended.  Lymph/Hematologic: No lymphadenopathy in areas examined.  Genitourinary: PureWick intact.  Skin: No rashes, no cyanosis, no edema.  Musculoskeletal: Left upper extremity: Short arm cast intact and appears well-fitting. Skin intact to the examined areas. Scattered ecchymosis to the left shoulder, mostly over the superior and posterior aspects. No erythema. Mild swelling to the left supraclavicular region and diffusely to the shoulder. No obvious deformity. Upper arm compartments are soft and compressible. Tender over the distal clavicular and diffusely to the posterior and superior shoulder at the site of probable contusion. Nontender over the " proximal humerus, biceps, triceps, medial and lateral epicondyles, olecranon, and exposed digits. No attempts made to range the left shoulder due to pain. Patient is able to actively flex and extend the elbow within the confines of her sling. Digital flexors, extensors, adductors, and abductors intact throughout. AIN and PIN intact. Brisk capillary refill. SILT A/R/M/U nerve distributions.  Neurologic: GCS 15, A&OX2-3, normal mood and affect          Data:   All laboratory data reviewed  Results for orders placed or performed during the hospital encounter of 10/06/24   CT Head w/o Contrast     Status: None    Narrative    EXAM: CT HEAD W/O CONTRAST  LOCATION: [facility]  DATE: 10/6/2024    INDICATION: fall  COMPARISON: 9/15/2024.  TECHNIQUE: Routine CT Head without IV contrast. Multiplanar reformats. Dose reduction techniques were used.    FINDINGS:  INTRACRANIAL CONTENTS: Left frontoparietal subdural hematoma measuring up to 17 mm in thickness, new compared to 9/15/2024. The hematoma is overall hypodense relative to the adjacent brain parenchyma, although there are more hyperdense layering blood   products posteriorly. Mild mass effect on the left frontal and parietal lobes with 3 mm rightward midline shift. No CT evidence of acute infarct. Mild presumed chronic small vessel ischemic changes. Mild generalized volume loss. No hydrocephalus. J Carlos   cisterna magna is incidentally noted.    VISUALIZED ORBITS/SINUSES/MASTOIDS: No intraorbital abnormality. No paranasal sinus mucosal disease. No middle ear or mastoid effusion.    BONES/SOFT TISSUES: No acute abnormality.      Impression    IMPRESSION:  1.  New subdural hematoma along the left frontoparietal convexity with mild mass effect on the frontal and parietal lobes and 3 mm rightward midline shift.    These findings were communicated by phone to Dr. Galeas on 10/6/2024 12:01 PM CDT.     XR Shoulder Left G/E 3 Views     Status: None     Narrative    EXAM: XR SHOULDER LEFT G/E 3 VIEWS  LOCATION: [facility]  DATE: 10/6/2024    INDICATION: left arm pain  COMPARISON: None.      Impression    IMPRESSION: There is a nondisplaced fracture of the distal aspect of the left clavicle. The AC joint is intact with mild degenerative change. The glenohumeral joint is negative for fracture or dislocation.   CT Head w/o Contrast     Status: None    Narrative    EXAM: CT HEAD W/O CONTRAST  LOCATION: [facility]  DATE: 10/6/2024    INDICATION: SDH  COMPARISON: CT head 10/6/2024  TECHNIQUE: Routine CT Head without IV contrast. Multiplanar reformats. Dose reduction techniques were used.    FINDINGS:  INTRACRANIAL CONTENTS: Stable and similar left frontoparietal subdural hematoma measures up to 17 mm in maximum thickness. The collection is mixed density with some hyperdense layering products in its dependent portion similar to prior. Mild mass effect,   left to right midline shift 3 mm similar to prior. No CT evidence of acute infarct. Mild presumed chronic small vessel ischemic changes. Mild generalized volume loss. No hydrocephalus.     VISUALIZED ORBITS/SINUSES/MASTOIDS: No intraorbital abnormality. No paranasal sinus mucosal disease. No middle ear or mastoid effusion.    BONES/SOFT TISSUES: No acute abnormality.      Impression    IMPRESSION:  1.  Stable and similar mixed density left frontoparietal convexity subdural hematoma measures up to 17 mm in maximum thickness.  2.  Stable left-to-right 3 mm midline shift.   [place] Draw     Status: None    Narrative    The following orders were created for panel order [place] Draw.  Procedure                               Abnormality         Status                     ---------                               -----------         ------                     Extra Blue Top Tube[[id number]]                              Final result               Extra Red Top Tube[[id number]]                                Final result               Extra Green Top (Lithium...[[id number]]                      Final result                 Please view results for these tests on the individual orders.   Basic metabolic panel     Status: Normal   Result Value Ref Range    Sodium 140 135 - 145 mmol/L    Potassium 4.0 3.4 - 5.3 mmol/L    Chloride 101 98 - 107 mmol/L    Carbon Dioxide (CO2) 25 22 - 29 mmol/L    Anion Gap 14 7 - 15 mmol/L    Urea Nitrogen 20.0 8.0 - 23.0 mg/dL    Creatinine 0.74 0.51 - 0.95 mg/dL    GFR Estimate 82 >60 mL/min/1.73m2    Calcium 9.1 8.8 - 10.4 mg/dL    Glucose 99 70 - 99 mg/dL   UA with Microscopic reflex to Culture     Status: Abnormal    Specimen: Urine, Catheter   Result Value Ref Range    Color Urine Light Yellow Colorless, Straw, Light Yellow, Yellow    Appearance Urine Clear Clear    Glucose Urine Negative Negative mg/dL    Bilirubin Urine Negative Negative    Ketones Urine Trace (A) Negative mg/dL    Specific Gravity Urine 1.010 1.003 - 1.035    Blood Urine Negative Negative    pH Urine 5.5 5.0 - 7.0    Protein Albumin Urine Negative Negative mg/dL    Urobilinogen Urine Normal Normal, 2.0 mg/dL    Nitrite Urine Negative Negative    Leukocyte Esterase Urine Negative Negative    Mucus Urine Present (A) None Seen /LPF    RBC Urine <1 <=2 /HPF    WBC Urine 1 <=5 /HPF    Squamous Epithelials Urine <1 <=1 /HPF    Narrative    Urine Culture not indicated   Extra Blue Top Tube     Status: None   Result Value Ref Range    Hold Specimen JIC    Extra Red Top Tube     Status: None   Result Value Ref Range    Hold Specimen 0    Extra Green Top (Lithium Heparin) ON ICE     Status: None   Result Value Ref Range    Hold Specimen 0    CBC with platelets and differential     Status: Abnormal   Result Value Ref Range    WBC Count 14.0 (H) 4.0 - 11.0 10e3/uL    RBC Count 3.96 3.80 - 5.20 10e6/uL    Hemoglobin 12.7 11.7 - 15.7 g/dL    Hematocrit 37.1 35.0 - 47.0 %    MCV 94 78 - 100 fL    MCH 32.1 26.5  - 33.0 pg    MCHC 34.2 31.5 - 36.5 g/dL    RDW 12.6 10.0 - 15.0 %    Platelet Count 252 150 - 450 10e3/uL    % Neutrophils 85 %    % Lymphocytes 6 %    % Monocytes 9 %    % Eosinophils 0 %    % Basophils 0 %    % Immature Granulocytes 0 %    NRBCs per 100 WBC 0 <1 /100    Absolute Neutrophils 12.0 (H) 1.6 - 8.3 10e3/uL    Absolute Lymphocytes 0.8 0.8 - 5.3 10e3/uL    Absolute Monocytes 1.2 0.0 - 1.3 10e3/uL    Absolute Eosinophils 0.0 0.0 - 0.7 10e3/uL    Absolute Basophils 0.0 0.0 - 0.2 10e3/uL    Absolute Immature Granulocytes 0.0 <=0.4 10e3/uL    Absolute NRBCs 0.0 10e3/uL   CK total     Status: Abnormal   Result Value Ref Range    CK 1,186 (HH) 26 - 192 U/L   CK total     Status: Abnormal   Result Value Ref Range    CK 1,190 (HH) 26 - 192 U/L   Comprehensive metabolic panel     Status: Abnormal   Result Value Ref Range    Sodium 140 135 - 145 mmol/L    Potassium 4.1 3.4 - 5.3 mmol/L    Carbon Dioxide (CO2) 23 22 - 29 mmol/L    Anion Gap 9 7 - 15 mmol/L    Urea Nitrogen 14.8 8.0 - 23.0 mg/dL    Creatinine 0.64 0.51 - 0.95 mg/dL    GFR Estimate 90 >60 mL/min/1.73m2    Calcium 8.5 (L) 8.8 - 10.4 mg/dL    Chloride 108 (H) 98 - 107 mmol/L    Glucose 89 70 - 99 mg/dL    Alkaline Phosphatase 94 40 - 150 U/L    AST 49 (H) 0 - 45 U/L    ALT 21 0 - 50 U/L    Protein Total 5.9 (L) 6.4 - 8.3 g/dL    Albumin 3.3 (L) 3.5 - 5.2 g/dL    Bilirubin Total 0.7 <=1.2 mg/dL   CBC with platelets and differential     Status: Abnormal   Result Value Ref Range    WBC Count 6.5 4.0 - 11.0 10e3/uL    RBC Count 3.67 (L) 3.80 - 5.20 10e6/uL    Hemoglobin 11.9 11.7 - 15.7 g/dL    Hematocrit 34.5 (L) 35.0 - 47.0 %    MCV 94 78 - 100 fL    MCH 32.4 26.5 - 33.0 pg    MCHC 34.5 31.5 - 36.5 g/dL    RDW 13.2 10.0 - 15.0 %    Platelet Count 219 150 - 450 10e3/uL    % Neutrophils 66 %    % Lymphocytes 22 %    % Monocytes 11 %    % Eosinophils 1 %    % Basophils 0 %    % Immature Granulocytes 0 %    NRBCs per 100 WBC 0 <1 /100    Absolute Neutrophils  4.3 1.6 - 8.3 10e3/uL    Absolute Lymphocytes 1.4 0.8 - 5.3 10e3/uL    Absolute Monocytes 0.7 0.0 - 1.3 10e3/uL    Absolute Eosinophils 0.0 0.0 - 0.7 10e3/uL    Absolute Basophils 0.0 0.0 - 0.2 10e3/uL    Absolute Immature Granulocytes 0.0 <=0.4 10e3/uL    Absolute NRBCs 0.0 10e3/uL   EKG 12-lead, tracing only     Status: None   Result Value Ref Range    Systolic Blood Pressure  mmHg    Diastolic Blood Pressure  mmHg    Ventricular Rate 68 BPM    Atrial Rate 68 BPM    AZ Interval 154 ms    QRS Duration 82 ms     ms    QTc 425 ms    P Axis 46 degrees    R AXIS 13 degrees    T Axis 67 degrees    Interpretation ECG       Sinus rhythm  Nonspecific T wave abnormality  Abnormal ECG  When compared with ECG of 15-Sep-2024 19:10,  Premature supraventricular complexes are no longer Present  Nonspecific T wave abnormality now evident in Lateral leads  Confirmed by GENERATED REPORT, COMPUTER ([id number]),  Alison Weaver ([id number]) on 10/6/2024 11:49:03 AM     CBC with platelets + differential     Status: Abnormal    Narrative    The following orders were created for panel order CBC with platelets + differential.  Procedure                               Abnormality         Status                     ---------                               -----------         ------                     CBC with platelets and d...[[id number]]  Abnormal            Final result                 Please view results for these tests on the individual orders.   CBC with platelets differential     Status: Abnormal    Narrative    The following orders were created for panel order CBC with platelets differential.  Procedure                               Abnormality         Status                     ---------                               -----------         ------                     CBC with platelets and d...[[id number]]  Abnormal            Final result                 Please view results for these tests on the individual orders.           Attestation:  I have reviewed today's vital signs, notes, medications, labs and imaging with Dr. Miguel Abdullahi.  Amount of time performed on this consult: 50 minutes.    Sophy Londono PA-C  [facility]

## 2024-10-08 ENCOUNTER — APPOINTMENT (OUTPATIENT)
Dept: OCCUPATIONAL THERAPY | Facility: CLINIC | Age: 79
DRG: 965 | End: 2024-10-08
Attending: HOSPITALIST
Payer: COMMERCIAL

## 2024-10-08 ENCOUNTER — APPOINTMENT (OUTPATIENT)
Dept: PHYSICAL THERAPY | Facility: CLINIC | Age: 79
DRG: 965 | End: 2024-10-08
Payer: COMMERCIAL

## 2024-10-08 PROCEDURE — 120N000001 HC R&B MED SURG/OB

## 2024-10-08 PROCEDURE — 97750 PHYSICAL PERFORMANCE TEST: CPT | Mod: GP

## 2024-10-08 PROCEDURE — 250N000013 HC RX MED GY IP 250 OP 250 PS 637: Performed by: INTERNAL MEDICINE

## 2024-10-08 PROCEDURE — 250N000013 HC RX MED GY IP 250 OP 250 PS 637: Performed by: PHYSICIAN ASSISTANT

## 2024-10-08 PROCEDURE — 97535 SELF CARE MNGMENT TRAINING: CPT | Mod: GO

## 2024-10-08 PROCEDURE — 97116 GAIT TRAINING THERAPY: CPT | Mod: GP

## 2024-10-08 PROCEDURE — 97165 OT EVAL LOW COMPLEX 30 MIN: CPT | Mod: GO

## 2024-10-08 PROCEDURE — 99232 SBSQ HOSP IP/OBS MODERATE 35: CPT | Performed by: HOSPITALIST

## 2024-10-08 PROCEDURE — 250N000013 HC RX MED GY IP 250 OP 250 PS 637: Performed by: HOSPITALIST

## 2024-10-08 RX ORDER — METHOCARBAMOL 500 MG/1
500 TABLET, FILM COATED ORAL EVERY 8 HOURS
Status: DISCONTINUED | OUTPATIENT
Start: 2024-10-08 | End: 2024-10-10 | Stop reason: HOSPADM

## 2024-10-08 RX ORDER — METHOCARBAMOL 500 MG/1
500 TABLET, FILM COATED ORAL 3 TIMES DAILY
Status: DISCONTINUED | OUTPATIENT
Start: 2024-10-08 | End: 2024-10-08

## 2024-10-08 RX ADMIN — DONEPEZIL HYDROCHLORIDE 5 MG: 5 TABLET, FILM COATED ORAL at 21:47

## 2024-10-08 RX ADMIN — LATANOPROST 1 DROP: 50 SOLUTION OPHTHALMIC at 21:48

## 2024-10-08 RX ADMIN — LIDOCAINE 1 PATCH: 4 PATCH TOPICAL at 09:36

## 2024-10-08 RX ADMIN — ACETAMINOPHEN 650 MG: 325 TABLET ORAL at 19:48

## 2024-10-08 RX ADMIN — AMLODIPINE BESYLATE 2.5 MG: 2.5 TABLET ORAL at 09:36

## 2024-10-08 RX ADMIN — METHOCARBAMOL 500 MG: 500 TABLET ORAL at 21:47

## 2024-10-08 RX ADMIN — ACETAMINOPHEN 650 MG: 325 TABLET ORAL at 09:36

## 2024-10-08 RX ADMIN — METHOCARBAMOL 500 MG: 500 TABLET ORAL at 12:57

## 2024-10-08 ASSESSMENT — ACTIVITIES OF DAILY LIVING (ADL)
ADLS_ACUITY_SCORE: 46
ADLS_ACUITY_SCORE: 54
ADLS_ACUITY_SCORE: 46
ADLS_ACUITY_SCORE: 54
ADLS_ACUITY_SCORE: 54
ADLS_ACUITY_SCORE: 46
ADLS_ACUITY_SCORE: 46
ADLS_ACUITY_SCORE: 54
ADLS_ACUITY_SCORE: 46
ADLS_ACUITY_SCORE: 54
ADLS_ACUITY_SCORE: 54
ADLS_ACUITY_SCORE: 46
ADLS_ACUITY_SCORE: 46
ADLS_ACUITY_SCORE: 54
ADLS_ACUITY_SCORE: 46
ADLS_ACUITY_SCORE: 54
ADLS_ACUITY_SCORE: 46
ADLS_ACUITY_SCORE: 46
ADLS_ACUITY_SCORE: 54
ADLS_ACUITY_SCORE: 48
ADLS_ACUITY_SCORE: 46

## 2024-10-08 NOTE — PLAN OF CARE
Reason for Admission: L frontoparietal SDH    Cognitive/Mentation: A/Ox 4  Neuros/CMS: Intact ex forgetful at times, mild aphasia, decreased ROM of LUE r/t fracture, generalized weakness, LUE 2/5, RUE/RLE/LLE 4/5  VS: VSS on RA; SBP< 150.   Tele: NSR.  /GI: INC/INC; Purewick in place when in bed  Pulmonary: LS clear.  Pain: 3/1o L shoulder pain consistently    Drains/Lines: PIV SL  Skin: L shoulder bruises  Activity: Assist x 1 GB + walker  Diet: Regular with thin liquids. Takes pills whole w/ water.     Therapies recs: TBD  Discharge: Recs for TCU    Aggression Stoplight Tool: Green

## 2024-10-08 NOTE — PROGRESS NOTES
Functional Gait Assessment (FGA): The FGA assesses postural stability during various walking tasks.     Gait assistive device used: None     Scores of <22 /30 have been correlated with predicting falls in community-dwelling older adults according to Dwain & Joaquin 2010.   Scores of <18 /30 have been correlated with increased risk for falls in patients with Parkinsons Disease according to Chon Knott Zhou et al 2014.  Minimal Detectable Change for patients with acute/chronic stroke = 4.2 according to Jossue & Ysabel 2009  Minimal Detectable Change for patients with vestibular disorder = 8 according to Dwain & Joaquin 2010     Assessment (rationale for performing, application to patient s function & care plan): Performed to assess balance with gait. Scored at 18/30 without an assistive device. Pt will benefit from continued IP PT and discharge to TCU to continue PT and OT in order to further address deficits prior to returning home. Also recommend pt use platform walker for all functional mobility at this time.   (Minutes billed as physical performance test): 15

## 2024-10-08 NOTE — PROGRESS NOTES
10/08/24 0824   Appointment Info   Signing Clinician's Name / Credentials (OT) Sulma Cooper, RAPHAEL   Living Environment   People in Home alone   Current Living Arrangements independent living facility   Home Accessibility no concerns   Transportation Anticipated family or friend will provide   Living Environment Comments Pt lives in an independent living facility, no stairs to navigate (does have stairs at children's home). Pt has a walk in shower and tub showe, standard height toilets.   Self-Care   Usual Activity Tolerance good   Current Activity Tolerance moderate   Regular Exercise Yes   Activity/Exercise Type walking   Fall history within last six months yes   Number of times patient has fallen within last six months 2   Activity/Exercise/Self-Care Comment Pt typically indep with funcitonal mobility and self cares, 2 falls in the last month resulting in injury. Per chart review, family states dementia is getting worse.   General Information   Onset of Illness/Injury or Date of Surgery 10/06/24   Referring Physician Maria Del Carmen Ramos MD   Additional Occupational Profile Info/Pertinent History of Current Problem Connie Greenwood is a 79 year old female with PMH of HTN, HLD, dementia who presented to the ED on 10/6/24 for evaluation after a fall at home.     Patient's son found patient on the ground this morning. He saw her last night and she was noted to be wearing the same clothing. Patient has underlying dementia and is unable to recall any details surrounding her fall or if she passed out. She has left shoulder pain and says she bumped her head, but denies any headache or any other pain. Denies chest pain, dyspnea, palpitations, fevers, chills, dysuria. She admits to visual hallucinations, unclear duration, per family relatively new today although she did have some increased confusion last hospitalization associated with narcotics. She has not received any narcotics today.     In the ED, VSS. CBC with WBC  14.0. BMP unremarkable. UA bland. CT head with new subdural hematoma along the left frontoparietal convexity with mild mass effect on the frontal and parietal lobe and 3 mm rightward midline shift. Left shoulder xray showed a non displaced fracture of the distal aspect of the left clavicle.     *Note, patient was recently hospitalized at [facility] 9/15/24 - 9/17/24 for fall with possible syncope. She sustained two acute left sided rib fractures, left distal radius fracture, scalp laceration. CT head NAD. Syncope was felt to possibly be vasovagal due to hot weather/dehydration.   Existing Precautions/Restrictions fall   Left Upper Extremity (Weight-bearing Status) non weight-bearing (NWB)  (can hold a cup of coffee, and WB through platform walker okay)   Cognitive Status Examination   Orientation Status person;place   Affect/Mental Status (Cognitive) confused   Follows Commands WFL   Safety Deficit minimal deficit;awareness of need for assistance;safety precautions follow-through/compliance   Memory Deficit moderate deficit   Cognitive Status Comments Dementia at baseline, family reports worsening symptoms. Pt unaware why she was in the hospital. Frequently asked how shoulder pain and what she can do to make it better, reminded pt she broke her clavicle.   Visual Perception   Visual Impairment/Limitations corrective lenses full-time   Sensory   Sensory Quick Adds sensation intact   Pain Assessment   Patient Currently in Pain Yes, see Vital Sign flowsheet   Posture   Posture forward head position;protracted shoulders   Range of Motion Comprehensive   General Range of Motion upper extremity range of motion deficits identified   Comment, General Range of Motion R UE WFL, L UE NT d/t precautions from clavicle fx and wrist fx   Strength Comprehensive (MMT)   Comment, General Manual Muscle Testing (MMT) Assessment L UE NT. R UE WFL   Coordination   Upper Extremity Coordination No deficits were identified   Bed Mobility   Bed  Mobility supine-sit   Supine-Sit Geneva (Bed Mobility) moderate assist (50% patient effort)   Transfers   Transfers sit-stand transfer   Transfer Comments min A   Activities of Daily Living   BADL Assessment/Intervention lower body dressing   Lower Body Dressing Assessment/Training   [place]eva Level (Lower Body Dressing) don;socks;moderate assist (50% patient effort)   Clinical Impression   Criteria for Skilled Therapeutic Interventions Met (OT) Yes, treatment indicated   OT Diagnosis impaired funcitonal mobility and self cares   OT Problem List-Impairments impacting ADL problems related to;cognition;coordination;range of motion (ROM);strength;pain;post-surgical precautions   Assessment of Occupational Performance 1-3 Performance Deficits   Identified Performance Deficits Impaired bathing, dressing, and mobility independence   Planned Therapy Interventions (OT) ADL retraining;cognition;home program guidelines;transfer training   Clinical Decision Making Complexity (OT) problem focused assessment/low complexity   Risk & Benefits of therapy have been explained evaluation/treatment results reviewed;care plan/treatment goals reviewed   OT Total Evaluation Time   OT Eval, Low Complexity Minutes (15918) 10   OT Goals   Therapy Frequency (OT) 5 times/week   OT Predicted Duration/Target Date for Goal Attainment 10/15/24   OT Goals Hygiene/Grooming;Toilet Transfer/Toileting   OT: Hygiene/Grooming supervision/stand-by assist;while standing   OT: Upper Body Dressing Supervision/stand-by assist;including orthotic   OT: Lower Body Dressing Supervision/stand-by assist;using adaptive equipment   OT: Lower Body Bathing Supervision/stand-by assist   OT: Toilet Transfer/Toileting Supervision/stand-by assist;toilet transfer;cleaning and garment management   OT: Cognitive Patient/caregiver will verbalize understanding of cognitive assessment results/recommendations as needed for safe discharge planning   Self-Care/Home  Management   Self-Care/Home Mgmt/ADL, Compensatory, Meal Prep Minutes (04848) 26   Symptoms Noted During/After Treatment (Meal Preparation/Planning Training) increased pain   Treatment Detail/Skilled Intervention Supine in bed, agrees to OT. Very forgetful but pleasant. Bed mob with mod A. Needing multiple cues to attend to fx and NWB to L UE. Pt forgetful about situation. Sit to stand with FWW and min A, use of platform walker. ASsist initially to bring FWW forward as pain with L UE. Min A for ambulation to BR. Min A for toilet tx and brief management. Set up for pericares. Assist with donning clean brief as unable to with only one hand. G/h at sink with SBA for standing balance, uses R UE only for tasks. Returns to bedside chair at end of session. Pt pleasantly forgetful, alarm on.   OT Discharge Planning   OT Plan ambulation with platform walker for ADLs, LB dressing with R UE only   OT Discharge Recommendation (DC Rec) Transitional Care Facility   OT Rationale for DC Rec Pt below baseline, limited by deficits in balance and precautions with clavicle fx. Pt pleasantly forgetful. Unable to return home alone. Anticipate TCU stay is needed for balance, strengthening and management of clavicle fx. Pt likely needing more supportive living environemnt post TCU stay d/t multiple falls with hospital stays and progressive dementia.   OT Brief overview of current status Goals of therapy will be to address safe mobility and make recs for d/c to next level of care. Pt and RN will continue to follow all falls risk precautions as documented by RN staff while hospitalized   Total Session Time   Timed Code Treatment Minutes 26   Total Session Time (sum of timed and untimed services) 36

## 2024-10-08 NOTE — CONSULTS
Care Management Initial Consult    General Information  Assessment completed with: Patient, Children, Patient and Haleigh (daughter)  Type of CM/SW Visit: Initial Assessment    Primary Care Provider verified and updated as needed: Yes   Readmission within the last 30 days: previous discharge plan unsuccessful, other (see comments)         Advance Care Planning: Advance Care Planning Reviewed: no concerns identified        Communication Assessment  Patient's communication style: spoken language (English or Bilingual)        Cognitive  Cognitive/Neuro/Behavioral: .WDL except, orientation  Level of Consciousness: alert, confused  Arousal Level: opens eyes spontaneously  Orientation: disoriented to, situation  Mood/Behavior: calm, cooperative  Best Language: 1 - Mild to moderate  Speech: clear, spontaneous, logical    Living Environment:   People in home: alone     Current living Arrangements: independent living facility, apartment      Able to return to prior arrangements: no     Family/Social Support:  Care provided by: self  Provides care for: no one  Marital Status:   Support system: Children, Sibling(s), Friend          Description of Support System: Supportive, Involved    Support Assessment: Adequate family and caregiver support, Adequate social supports    Current Resources:   Patient receiving home care services: No     Community Resources: None  Equipment currently used at home: none  Supplies currently used at home:      Employment/Financial:  Employment Status: retired     Financial Concerns: none   Referral to Financial Worker: No    Does the patient's insurance plan have a 3 day qualifying hospital stay waiver?  Yes   Which insurance plan 3 day waiver is available? Alternative insurance waiver  Will the waiver be used for post-acute placement? No    Lifestyle & Psychosocial Needs:  Social Determinants of Health     Food Insecurity: Low Risk  (9/16/2024)    Food Insecurity     Within the past 12  months, did you worry that your food would run out before you got money to buy more?: No     Within the past 12 months, did the food you bought just not last and you didn t have money to get more?: No   Depression: Not at risk (7/19/2023)    Received from [facility]    PHQ-2     PHQ-2 TOTAL SCORE: 0   Housing Stability: Low Risk  (9/16/2024)    Housing Stability     Do you have housing? : Yes     Are you worried about losing your housing?: No   Tobacco Use: Low Risk  (9/20/2024)    Received from [facility]    Patient History     Smoking Tobacco Use: Never     Smokeless Tobacco Use: Never     Passive Exposure: Not on file   Financial Resource Strain: Low Risk  (9/16/2024)    Financial Resource Strain     Within the past 12 months, have you or your family members you live with been unable to get utilities (heat, electricity) when it was really needed?: No   Alcohol Use: Not on file   Transportation Needs: Low Risk  (9/16/2024)    Transportation Needs     Within the past 12 months, has lack of transportation kept you from medical appointments, getting your medicines, non-medical meetings or appointments, work, or from getting things that you need?: No   Physical Activity: Not on file   Interpersonal Safety: Not on file   Stress: Not on file   Social Connections: Unknown (8/16/2023)    Received from [facility]    Social Connections     Frequency of Communication with Friends and Family: Not on file   Health Literacy: Not on file     Functional Status:  Prior to admission patient needed assistance:   Dependent ADLs:: Independent     Mental Health Status:  Mental Health Status: No Current Concerns     Chemical Dependency Status:  Chemical Dependency Status: No Current Concerns           Values/Beliefs:  Spiritual, Cultural Beliefs, Judaism Practices, Values that affect care: yes          Values/Beliefs Comment:  Restorationist    Discussed  Partnership in Safe Discharge Planning  document with patient/family: No    Additional Information:  AMY consulted for discharge planning and completed chart review. Per ED Provider notes, patient arrived via EMS to the emergency department after falling at home. AMY met with patient and daughter Haleigh that was at the bedside to introduce self/role, confirm information in the chart, and discuss discharge plans. Patient resides in a senior apartment called [facility] by herself. She has no services and this apartment does not offer any services. She has a very supportive and involved family who are now trying to find alternative housing in assisted living. They have already set up tours and meetings at assisted living places. SW provided them with a senior housing booklet.     Patient has recommendations to go to TCU and she and family are in agreement. SW provided a list of options and went through the options close to [facility]. Discussed medicare.gov for ratings and medicare coverage. The patient wants a private room and they are agreeable to any private pay fees for this. When the time comes, patient's daughter or son Av will transport.     VIKA Ngo, LGSW   Social Work   [facility]

## 2024-10-08 NOTE — PROGRESS NOTES
[facility]    Hospitalist Progress Note    Interval History   Patient is awake and alert.  Sitting up in chair.  Endorsing left shoulder and left wrist discomfort.  Pleasantly confused with baseline dementia.  No significant delirium.    -Data reviewed today: I reviewed all new labs and imaging results over the last 24 hours. I personally reviewed the head CT image(s) showing stable and similar mixed density left frontoparietal convexity subdural hematoma measuring up to 17 mm in maximum thickness.  Stable left to right 3 mm midline shift. .    Physical Exam   Temp: 97.7  F (36.5  C) Temp src: Oral BP: 118/65 Pulse: 81   Resp: 16 SpO2: 98 % O2 Device: None (Room air)    Vitals:    10/06/24 1054 10/07/24 0806   Weight: 73.5 kg (162 lb) 76.3 kg (168 lb 3.4 oz)     Vital Signs with Ranges  Temp:  [97.6  F (36.4  C)-99  F (37.2  C)] 97.7  F (36.5  C)  Pulse:  [71-81] 81  Resp:  [16] 16  BP: (113-146)/(60-89) 118/65  SpO2:  [93 %-99 %] 98 %  I/O last 3 completed shifts:  In: -   Out: 300 [Urine:300]    Physical Exam  Constitutional:       Appearance: She is ill-appearing.   Eyes:      Comments: Disconjugate gaze   Cardiovascular:      Rate and Rhythm: Normal rate and regular rhythm.      Pulses: Normal pulses.      Heart sounds: Normal heart sounds.   Pulmonary:      Effort: Pulmonary effort is normal. No respiratory distress.      Breath sounds: Normal breath sounds.   Abdominal:      General: Abdomen is flat. Bowel sounds are normal. There is no distension.      Tenderness: There is no abdominal tenderness. There is no guarding.   Musculoskeletal:      Comments: Left arm in sling   Skin:     General: Skin is warm and dry.   Neurological:      General: No focal deficit present.      Comments: Generalized weakness with 3 x 5 strength in lower extremities.  Gait not assessed.  No cranial nerve deficits seen.           Medications   Current Facility-Administered Medications   Medication Dose Route  Frequency Provider Last Rate Last Admin     Current Facility-Administered Medications   Medication Dose Route Frequency Provider Last Rate Last Admin    amLODIPine (NORVASC) tablet 2.5 mg  2.5 mg Oral Daily Maria Del Carmen Ramos MD   2.5 mg at 10/08/24 0936    donepezil (ARICEPT) tablet 5 mg  5 mg Oral Daily Wanda Wild PA-C   5 mg at 10/07/24 2155    latanoprost (XALATAN) 0.005 % ophthalmic solution 1 drop  1 drop Both Eyes At Bedtime Wanda Wild PA-C   1 drop at 10/07/24 2155    Lidocaine (LIDOCARE) 4 % Patch 1 patch  1 patch Transdermal Q24H Wanda Wild PA-C   1 patch at 10/08/24 0936    methocarbamol (ROBAXIN) tablet 500 mg  500 mg Oral Q8H Franklin Blanco DO   500 mg at 10/08/24 1257    [Held by provider] simvastatin (ZOCOR) tablet 20 mg  20 mg Oral At Bedtime Wanda Wild PA-C        sodium chloride (PF) 0.9% PF flush 3 mL  3 mL Intracatheter Q8H Wanda Wild PA-C   3 mL at 10/08/24 0344       Data   Recent Labs   Lab 10/07/24  0728 10/06/24  1106   WBC 6.5 14.0*   HGB 11.9 12.7   MCV 94 94    252    140   POTASSIUM 4.1 4.0   CHLORIDE 108* 101   CO2 23 25   BUN 14.8 20.0   CR 0.64 0.74   ANIONGAP 9 14   ISAURO 8.5* 9.1   GLC 89 99   ALBUMIN 3.3*  --    PROTTOTAL 5.9*  --    BILITOTAL 0.7  --    ALKPHOS 94  --    ALT 21  --    AST 49*  --        Recent Results (from the past 24 hour(s))   XR Chest 2 Views    Narrative    CHEST TWO VIEWS  10/7/2024 3:33 PM     HISTORY: 79-year-old woman with question of new rib fracture.       Impression    IMPRESSION: Since September 17, 2024, heart size is normal. No acute  cardiopulmonary process. Somewhat difficult to visualized rib  fracture. No obvious fracture identified.    JUAN ELIZALDE MD         SYSTEM ID:  [id number]         Assessment & Plan      Connie Greenwood is a 79 year old female with PMH of HTN, HLD, dementia who presented to the ED on 10/6/24 for evaluation after a fall at home.     Patient's son found  patient on the ground this morning. He saw her last night and she was noted to be wearing the same clothing. Patient has underlying dementia and is unable to recall any details surrounding her fall or if she passed out. She has left shoulder pain and says she bumped her head, but denies any headache or any other pain. Denies chest pain, dyspnea, palpitations, fevers, chills, dysuria. She admits to visual hallucinations, unclear duration, per family relatively new today although she did have some increased confusion last hospitalization associated with narcotics. She has not received any narcotics today.     In the ED, VSS. CBC with WBC 14.0. BMP unremarkable. UA bland. CT head with new subdural hematoma along the left frontoparietal convexity with mild mass effect on the frontal and parietal lobe and 3 mm rightward midline shift. Left shoulder xray showed a non displaced fracture of the distal aspect of the left clavicle.     *Note, patient was recently hospitalized at [facility] 9/15/24 - 9/17/24 for fall with possible syncope. She sustained two acute left sided rib fractures, left distal radius fracture, scalp laceration. CT head NAD. Syncope was felt to possibly be vasovagal due to hot weather/dehydration.     Acute subdural hematoma  HTN  *CT head shows acute subdural hematoma along the left frontoparietal convexity measuring up to 17 mm with 3 mm rightward midline shift. No neuro deficits on exam.  - Neurosurgery consult.  Appreciate recommendations  - Goal SBP <150, IV Hydralazine available PRN  - Given her recurrent falls, will stop Spironolactone and start Norvasc 2.5 mg daily  - Keep HOB >30 degrees  - Neurochecks and vitals q2h  - Repeat CT head in 6 hours continue to remain stable.  Conservative management per neurosurgery.  -Follow-up in neurosurgery clinic in 4 weeks with repeat head CT.  Also repeat head CT with any worsening symptoms including worsening right-sided weakness or mental status changes.  No  blood thinners including aspirin or ibuprofen until follow-up.  Neurosurgery signed off.     Left clavicle fracture  - Sling for comfort. Okay to remove for hygiene, skin checks, to apply cold compresses, to allow gentle traction by gravity to the fracture to aid in better alignment, and to complete ROM as below per ortho   - Acetaminophen as needed  - Routine orthopedic consult.  Appreciate recommendations  - PT/OT eval .  Recommending TCU.  Per Ortho  - No lifting more than a full coffee cup with the LUE. Okay for use of platform walker if needed.   - Okay to progress to Codman's exercises of the left shoulder as pain allows, likely in the next several days to a week. Encourage frequent ROM of the elbow and digits to prevent stiffness   -Neurovascular checks every 4 hours while inpatient.  -Follow-up with Dr. contreras at [facility] in 4 weeks with repeat x-rays and reevaluate left clavicle and rest.Please call [phone] to schedule.      Recurrent falls  Rhabdomyolysis  *Unclear etiology of fall or if any associated syncope. Note that patient was recently hospitalized for a syncopal episode 2 weeks ago, felt to be vasovagal due to hot weather and dehydration. Patient is unable to recall the events surrounding her most recent fall.  *Patient lives alone in independent living, currently had no HH services. Her family checks in on her regularly.  *Echo 9/17/24 with EF 60-65%, mild tricuspid regurgitation  - Total CK level mildly elevated at 1,186 -> received 1 L IV fluid overnight.  Stopped IV fluids due to supply issues.  Recheck CK at 1190.  Continue to hold fluids for now and recheck in the evening.  If it is still elevated will consider restarting IV fluids.  Will encourage oral intake.  - Stop PTA Spironolactone, start Norvasc 2.5 mg daily for BP control  -Hold for SBP less than 120  - Continuous telemetry  - Check orthostatics once stable from neuro standpoint  - PT/OT evaluation-recommending TCU.  - SW consult  "for discharge planning  - Consider discharge with Zio patch     Increased confusion in the setting of dementia  *Oriented x 3 but forgetful at baseline. She is currently oriented to self and knew she was in the hospital, but not which. Disoriented to date. She has been having visual hallucinations for the past 4 weeks which may be related to her head injury and dementia.  *UA is bland. WBC 14 but no s/s infection, favor stress reaction.  - Continue PTA Aricept  - Delirium precautions     Recent left 7th and 8th rib fractures  *Diagnosed 9/15/24 after fall at home  - Lidoderm patch PRN  - Acetaminophen PRN     Recent distal left radius fracture  *Diagnosed 9/15/24 after a fall at home. Seen by orthopedics last admission and was placed in a splint, conservative management recommended.  - Continue OP orthopedics follow up     HLD  - Continue PTA Simvastatin       Clinically Significant Risk Factors          # Hypocalcemia: Lowest Ca = 8.5 mg/dL in last 2 days, will monitor and replace as appropriate     # Hypoalbuminemia: Lowest albumin = 3.3 g/dL at 10/7/2024  7:28 AM, will monitor as appropriate     # Hypertension: Noted on problem list    # Dementia: noted on problem list        # Overweight: Estimated body mass index is 27.15 kg/m  as calculated from the following:    Height as of this encounter: 1.676 m (5' 6\").    Weight as of this encounter: 76.3 kg (168 lb 3.4 oz)., PRESENT ON ADMISSION             DVT Prophylaxis: Pneumatic Compression Devices  Code Status: Full Code  Medically Ready for Discharge: Ready Now  Pending TCU placement.     Greater than 35 minutes spent on documentation review, imaging review, lab review, examining the patient and discussing care with family at bedside    Maria Del Carmen Ramos MD, MD  [phone](p)   "

## 2024-10-08 NOTE — PLAN OF CARE
Goal Outcome Evaluation:      Plan of Care Reviewed With: patient    Overall Patient Progress: no changeOverall Patient Progress: no change         Pt here with L. Frontal parietal SDH following an unwitnessed fall. Pt also has broken clavicle - is in sling for comfort only; okay to remove. A&O x 3-4, d/o to situation. Neuros intact ex mild aphasia, forgetful, hx dementia, gen. Weakness - L side slightly weaker than R. VSS. Reg diet, thin liquids. Takes pills whole one at a time. Up with 1 & GB. Denies pain at rest, has pain with movement of LUE. Purewick in place. Pt scoring green on the Aggression Stop Light Tool. Plan TBD. Discharge pending.

## 2024-10-08 NOTE — PLAN OF CARE
Goal Outcome Evaluation:      Plan of Care Reviewed With: patient, family    Reason for Admission: Fall, L SDH    Cognitive/Mentation: A/Ox 2-3, disoriented to situation/place at times. Forgetful with lack of new information recall  Neuros/CMS: Intact ex forgetfulness/confusion. Unable to assess LUE due to shoulder pain.  VS: Stable on RA.   Tele: NSR  /GI: Continent, last BM PTA  Pulmonary: LS clear.  Pain: Complaining of L shoulder pain with movement, tylenol and robaxin given. Lidocaine patch in place as well.     Drains/Lines: PIV - SL  Skin: Intact. Bruising to L eye and L shoulder  Activity: Assist x 1 with GB/W.  Diet: Regular with thin liquids. Takes pills whole with water.     Therapies recs: TCU  Discharge: Pending medical readiness    Aggression Stoplight Tool: Yellow for confusion - room near nursing station.     End of shift summary: Stable today. Very forgetful, but very pleasant and directable. SW met with family today about TCU placement when medically stable.

## 2024-10-09 ENCOUNTER — APPOINTMENT (OUTPATIENT)
Dept: PHYSICAL THERAPY | Facility: CLINIC | Age: 79
DRG: 965 | End: 2024-10-09
Payer: COMMERCIAL

## 2024-10-09 ENCOUNTER — APPOINTMENT (OUTPATIENT)
Dept: OCCUPATIONAL THERAPY | Facility: CLINIC | Age: 79
DRG: 965 | End: 2024-10-09
Payer: COMMERCIAL

## 2024-10-09 LAB — CK SERPL-CCNC: 652 U/L (ref 26–192)

## 2024-10-09 PROCEDURE — 36415 COLL VENOUS BLD VENIPUNCTURE: CPT | Performed by: HOSPITALIST

## 2024-10-09 PROCEDURE — 250N000013 HC RX MED GY IP 250 OP 250 PS 637: Performed by: INTERNAL MEDICINE

## 2024-10-09 PROCEDURE — 97116 GAIT TRAINING THERAPY: CPT | Mod: GP

## 2024-10-09 PROCEDURE — 82550 ASSAY OF CK (CPK): CPT | Performed by: HOSPITALIST

## 2024-10-09 PROCEDURE — 97535 SELF CARE MNGMENT TRAINING: CPT | Mod: GO

## 2024-10-09 PROCEDURE — 250N000013 HC RX MED GY IP 250 OP 250 PS 637: Performed by: HOSPITALIST

## 2024-10-09 PROCEDURE — 120N000001 HC R&B MED SURG/OB

## 2024-10-09 PROCEDURE — 99232 SBSQ HOSP IP/OBS MODERATE 35: CPT | Performed by: HOSPITALIST

## 2024-10-09 PROCEDURE — 250N000013 HC RX MED GY IP 250 OP 250 PS 637: Performed by: PHYSICIAN ASSISTANT

## 2024-10-09 RX ADMIN — METHOCARBAMOL 500 MG: 500 TABLET ORAL at 04:26

## 2024-10-09 RX ADMIN — LIDOCAINE 1 PATCH: 4 PATCH TOPICAL at 08:17

## 2024-10-09 RX ADMIN — METHOCARBAMOL 500 MG: 500 TABLET ORAL at 12:25

## 2024-10-09 RX ADMIN — ACETAMINOPHEN 650 MG: 325 TABLET ORAL at 16:46

## 2024-10-09 RX ADMIN — ACETAMINOPHEN 650 MG: 325 TABLET ORAL at 08:05

## 2024-10-09 RX ADMIN — METHOCARBAMOL 500 MG: 500 TABLET ORAL at 20:39

## 2024-10-09 RX ADMIN — ACETAMINOPHEN 650 MG: 325 TABLET ORAL at 20:39

## 2024-10-09 RX ADMIN — AMLODIPINE BESYLATE 2.5 MG: 2.5 TABLET ORAL at 08:05

## 2024-10-09 RX ADMIN — ACETAMINOPHEN 650 MG: 325 TABLET ORAL at 12:25

## 2024-10-09 RX ADMIN — LATANOPROST 1 DROP: 50 SOLUTION OPHTHALMIC at 21:34

## 2024-10-09 RX ADMIN — DONEPEZIL HYDROCHLORIDE 5 MG: 5 TABLET, FILM COATED ORAL at 20:39

## 2024-10-09 ASSESSMENT — ACTIVITIES OF DAILY LIVING (ADL)
ADLS_ACUITY_SCORE: 46
ADLS_ACUITY_SCORE: 43
ADLS_ACUITY_SCORE: 46
ADLS_ACUITY_SCORE: 43
ADLS_ACUITY_SCORE: 46
ADLS_ACUITY_SCORE: 46
ADLS_ACUITY_SCORE: 44
ADLS_ACUITY_SCORE: 44
ADLS_ACUITY_SCORE: 43
ADLS_ACUITY_SCORE: 44
ADLS_ACUITY_SCORE: 43
ADLS_ACUITY_SCORE: 43
ADLS_ACUITY_SCORE: 44
ADLS_ACUITY_SCORE: 43
ADLS_ACUITY_SCORE: 44
ADLS_ACUITY_SCORE: 43
ADLS_ACUITY_SCORE: 43
ADLS_ACUITY_SCORE: 46
ADLS_ACUITY_SCORE: 44

## 2024-10-09 NOTE — PLAN OF CARE
Goal Outcome Evaluation:  A and O except to day of the month. Forgetful. Pain in L shoulder, taking prn Tylenol and using ice packs with fair relief. VSS. Up with 1, belt, and platform walker. Ambulated in arizmendi. Good appetite on regular diet. Daughter at bedside. Scoring green on aggression screening tool.

## 2024-10-09 NOTE — PROGRESS NOTES
[facility]    Hospitalist Progress Note    Interval History   Patient is awake and alert.  Undergoing physical therapy this morning and quite stable.  Daughter reports that patient was confused last night and called her at home and sounded very lost and confused.  This morning patient is very pleasant.  Does have some baseline dementia and forgetfulness.    -Data reviewed today: I reviewed all new labs and imaging results over the last 24 hours. I personally reviewed the head CT image(s) showing stable and similar mixed density left frontoparietal convexity subdural hematoma measuring up to 17 mm in maximum thickness.  Stable left to right 3 mm midline shift. .    Physical Exam   Temp: 98.1  F (36.7  C) Temp src: Oral BP: 123/64 Pulse: 67   Resp: 18 SpO2: 98 % O2 Device: None (Room air)    Vitals:    10/06/24 1054 10/07/24 0806   Weight: 73.5 kg (162 lb) 76.3 kg (168 lb 3.4 oz)     Vital Signs with Ranges  Temp:  [97.8  F (36.6  C)-98.4  F (36.9  C)] 98.1  F (36.7  C)  Pulse:  [67-87] 67  Resp:  [16-18] 18  BP: (119-139)/(54-74) 123/64  SpO2:  [96 %-98 %] 98 %  I/O last 3 completed shifts:  In: 590 [P.O.:590]  Out: 800 [Urine:800]    Physical Exam  Constitutional:       Appearance: She is ill-appearing.   Eyes:      Comments: Disconjugate gaze   Cardiovascular:      Rate and Rhythm: Normal rate and regular rhythm.      Pulses: Normal pulses.      Heart sounds: Normal heart sounds.   Pulmonary:      Effort: Pulmonary effort is normal. No respiratory distress.      Breath sounds: Normal breath sounds.   Abdominal:      General: Abdomen is flat. Bowel sounds are normal. There is no distension.      Tenderness: There is no abdominal tenderness. There is no guarding.   Musculoskeletal:      Comments: Left arm in sling   Skin:     General: Skin is warm and dry.   Neurological:      General: No focal deficit present.      Comments: Generalized weakness with 3 x 5 strength in lower extremities.   Gait not assessed.  No cranial nerve deficits seen.           Medications   Current Facility-Administered Medications   Medication Dose Route Frequency Provider Last Rate Last Admin     Current Facility-Administered Medications   Medication Dose Route Frequency Provider Last Rate Last Admin    amLODIPine (NORVASC) tablet 2.5 mg  2.5 mg Oral Daily Maria Del Carmen Ramos MD   2.5 mg at 10/09/24 0805    donepezil (ARICEPT) tablet 5 mg  5 mg Oral Daily Wanda Wild PA-C   5 mg at 10/08/24 2147    latanoprost (XALATAN) 0.005 % ophthalmic solution 1 drop  1 drop Both Eyes At Bedtime Wanda Wild PA-C   1 drop at 10/08/24 2148    Lidocaine (LIDOCARE) 4 % Patch 1 patch  1 patch Transdermal Q24H Wanda Wild PA-C   1 patch at 10/09/24 0817    methocarbamol (ROBAXIN) tablet 500 mg  500 mg Oral Q8H Franklin Blanco DO   500 mg at 10/09/24 0426    [Held by provider] simvastatin (ZOCOR) tablet 20 mg  20 mg Oral At Bedtime Wanda Wild PA-C        sodium chloride (PF) 0.9% PF flush 3 mL  3 mL Intracatheter Q8H Wanda Wild PA-C   3 mL at 10/09/24 0425       Data   Recent Labs   Lab 10/07/24  0728 10/06/24  1106   WBC 6.5 14.0*   HGB 11.9 12.7   MCV 94 94    252    140   POTASSIUM 4.1 4.0   CHLORIDE 108* 101   CO2 23 25   BUN 14.8 20.0   CR 0.64 0.74   ANIONGAP 9 14   ISAURO 8.5* 9.1   GLC 89 99   ALBUMIN 3.3*  --    PROTTOTAL 5.9*  --    BILITOTAL 0.7  --    ALKPHOS 94  --    ALT 21  --    AST 49*  --        No results found for this or any previous visit (from the past 24 hour(s)).        Assessment & Plan      Connie Greenwood is a 79 year old female with PMH of HTN, HLD, dementia who presented to the ED on 10/6/24 for evaluation after a fall at home.     Patient's son found patient on the ground this morning. He saw her last night and she was noted to be wearing the same clothing. Patient has underlying dementia and is unable to recall any details surrounding her fall or if she  passed out. She has left shoulder pain and says she bumped her head, but denies any headache or any other pain. Denies chest pain, dyspnea, palpitations, fevers, chills, dysuria. She admits to visual hallucinations, unclear duration, per family relatively new today although she did have some increased confusion last hospitalization associated with narcotics. She has not received any narcotics today.     In the ED, VSS. CBC with WBC 14.0. BMP unremarkable. UA bland. CT head with new subdural hematoma along the left frontoparietal convexity with mild mass effect on the frontal and parietal lobe and 3 mm rightward midline shift. Left shoulder xray showed a non displaced fracture of the distal aspect of the left clavicle.     *Note, patient was recently hospitalized at [facility] 9/15/24 - 9/17/24 for fall with possible syncope. She sustained two acute left sided rib fractures, left distal radius fracture, scalp laceration. CT head NAD. Syncope was felt to possibly be vasovagal due to hot weather/dehydration.     Acute subdural hematoma  HTN  *CT head shows acute subdural hematoma along the left frontoparietal convexity measuring up to 17 mm with 3 mm rightward midline shift. No neuro deficits on exam.  - Neurosurgery consult.  Appreciate recommendations  - Goal SBP <150, IV Hydralazine available PRN  - Given her recurrent falls, will stop Spironolactone and start Norvasc 2.5 mg daily  - Keep HOB >30 degrees  - Neurochecks and vitals q2h  - Repeat CT head in 6 hours continue to remain stable.  Conservative management per neurosurgery.  -Follow-up in neurosurgery clinic in 4 weeks with repeat head CT.  Also repeat head CT with any worsening symptoms including worsening right-sided weakness or mental status changes.  No blood thinners including aspirin or ibuprofen until follow-up.  Neurosurgery signed off.     Left clavicle fracture  - Sling for comfort. Okay to remove for hygiene, skin checks, to apply cold compresses, to  allow gentle traction by gravity to the fracture to aid in better alignment, and to complete ROM as below per ortho   - Acetaminophen as needed  - Routine orthopedic consult.  Appreciate recommendations  - PT/OT eval .  Recommending TCU.  Per Ortho  - No lifting more than a full coffee cup with the LUE. Okay for use of platform walker if needed.   - Okay to progress to Codman's exercises of the left shoulder as pain allows, likely in the next several days to a week. Encourage frequent ROM of the elbow and digits to prevent stiffness   -Neurovascular checks every 4 hours while inpatient.  -Follow-up with Dr. contreras at [facility] in 4 weeks with repeat x-rays and reevaluate left clavicle and rest.Please call [phone] to schedule.      Recurrent falls  Rhabdomyolysis  *Unclear etiology of fall or if any associated syncope. Note that patient was recently hospitalized for a syncopal episode 2 weeks ago, felt to be vasovagal due to hot weather and dehydration. Patient is unable to recall the events surrounding her most recent fall.  *Patient lives alone in independent living, currently had no  services. Her family checks in on her regularly.  *Echo 9/17/24 with EF 60-65%, mild tricuspid regurgitation  - Total CK level mildly elevated at 1,186 -> received 1 L IV fluid overnight.  Stopped IV fluids due to supply issues.  Recheck CK at 1190.  Continue to hold fluids for now and recheck in the evening.  If it is still elevated will consider restarting IV fluids.  Will encourage oral intake.  - Stop PTA Spironolactone, start Norvasc 2.5 mg daily for BP control  -Hold for SBP less than 120  - Continuous telemetry  - PT/OT evaluation-recommending TCU.  -  consult for discharge planning  - Consider discharge with Zio patch     Increased confusion in the setting of dementia  *Oriented x 3 but forgetful at baseline. She is currently oriented to self and knew she was in the hospital, but not which. Disoriented to date. She has  "been having visual hallucinations for the past 4 weeks which may be related to her head injury and dementia.  *UA is bland. WBC 14 but no s/s infection, favor stress reaction.  - Continue PTA Aricept  - Delirium precautions     Recent left 7th and 8th rib fractures  *Diagnosed 9/15/24 after fall at home  - Lidoderm patch PRN  - Acetaminophen PRN     Recent distal left radius fracture  *Diagnosed 9/15/24 after a fall at home. Seen by orthopedics last admission and was placed in a splint, conservative management recommended.  - Continue OP orthopedics follow up     HLD  - Continue PTA Simvastatin       Clinically Significant Risk Factors               # Hypoalbuminemia: Lowest albumin = 3.3 g/dL at 10/7/2024  7:28 AM, will monitor as appropriate     # Hypertension: Noted on problem list    # Dementia: noted on problem list        # Overweight: Estimated body mass index is 27.15 kg/m  as calculated from the following:    Height as of this encounter: 1.676 m (5' 6\").    Weight as of this encounter: 76.3 kg (168 lb 3.4 oz)., PRESENT ON ADMISSION     # Financial/Environmental Concerns: none          DVT Prophylaxis: Pneumatic Compression Devices  Code Status: Full Code  Medically Ready for Discharge: Ready Now  Pending TCU placement.     Greater than 35 minutes spent on documentation review, lab review, imaging review and discussing care with the patient    Maria Del Carmen Ramos MD, MD  [phone](p)   "

## 2024-10-09 NOTE — PROGRESS NOTES
Care Management Follow Up    Length of Stay (days): 3    Expected Discharge Date: 10/10/2024     Concerns to be Addressed: discharge planning     Patient plan of care discussed at interdisciplinary rounds: Yes    Anticipated Discharge Disposition: Transitional Care      Referrals Placed by CM/SW:  TCU  Private pay costs discussed: Not applicable    Discussed  Partnership in Safe Discharge Planning  document with patient/family: No     Handoff Completed: No, handoff not indicated or clinically appropriate    Additional Information:  Patient has many declines today for TCU. SW spoke to daughter to update her and several other preferences were picked out and SW sent referrals.     ADD: 1520  Patient accepted to [facility] TCU. She will be in the memory care unit in a private room, but will have TCU services come to her. Called daughter and she was very excited and grateful. Family will be at the hospital tomorrow at 1200 to pick patient up and transport to [facility] TCU. Hospitalist aware. Updated other pending facilities that patient is going to [place].    PAS-RR    D: Per DHS regulation, SW completed and submitted PAS-RR to [place] [employer] Direct Connect via the Senior Link[employer] Line.  PAS-RR confirmation # is : [id number]    P: Further questions may be directed to Senior Link[employer] Line at #[phone], option #4 for PAS-RR staff.    Adriane Glynn, MSW, LGSW   Social Work   [facility]

## 2024-10-09 NOTE — PLAN OF CARE
Reason for Admission: L frontoparietal SDH    Cognitive/Mentation: A/Ox 4  Neuros/CMS: Intact ex forgetful at times, mild aphasia, decreased ROM of LUE r/t fracture, generalized weakness, LUE 2/5, RUE/RLE/LLE 4/5  VS: VSS on RA; SBP< 150.   Tele: NSR.  /GI: INC/INC; Purewick in place when in bed  Pulmonary: LS clear.  Pain: 2/1o L shoulder pain consistently; Tylenol x1 given    Drains/Lines: PIV SL  Skin: L shoulder bruises  Activity: Assist x 1 GB + walker  Diet: Regular with thin liquids. Takes pills whole w/ water.     Therapies recs: TBD  Discharge: Recs for TCU, Family will be at the hospital tomorrow at 1200 to pick patient up and transport to [facility] TCU.     Aggression Stoplight Tool: Green

## 2024-10-09 NOTE — PLAN OF CARE
Reason for Admission: L frontoparietal SDH; fractured L clavicle    Cognitive/Mentation: A/Ox 3-4  Neuros/CMS: Intact ex mild to mod expressive aphasia, LUE 3/5, RUE and BLE 4/5; weaker LUE d/t fracture and with a sling  VS: VSS on RA. SBP <150   Tele: NSR.  /GI: Incontinent. Purewick in place.  Pulmonary: LS clear.  Pain: denies.     Drains/Lines: R PIV SL  Skin: Bruising on L shoulder, blanchable redness on sacrum/coccyx  Activity: Assist x 1 with GBW.  Diet: Reg with thin liquids. Takes pills whole.     Therapies recs: TCU  Discharge: Pending placement    Aggression Stoplight Tool: Green    End of shift summary: Fair sleep in between cares. Appears comfortable and calm. No complains of pain on L shoulder. Arm sling in place to support shoulder.

## 2024-10-10 ENCOUNTER — ORDERS ONLY (AUTO-RELEASED) (OUTPATIENT)
Dept: MEDSURG UNIT | Facility: CLINIC | Age: 79
End: 2024-10-10
Payer: COMMERCIAL

## 2024-10-10 VITALS
RESPIRATION RATE: 21 BRPM | BODY MASS INDEX: 27.03 KG/M2 | HEIGHT: 66 IN | TEMPERATURE: 98.2 F | SYSTOLIC BLOOD PRESSURE: 120 MMHG | WEIGHT: 168.21 LBS | OXYGEN SATURATION: 98 % | DIASTOLIC BLOOD PRESSURE: 66 MMHG | HEART RATE: 59 BPM

## 2024-10-10 DIAGNOSIS — S06.5XAA SUBDURAL HEMATOMA (H): ICD-10-CM

## 2024-10-10 PROCEDURE — 99239 HOSP IP/OBS DSCHRG MGMT >30: CPT | Performed by: HOSPITALIST

## 2024-10-10 PROCEDURE — 250N000013 HC RX MED GY IP 250 OP 250 PS 637: Performed by: HOSPITALIST

## 2024-10-10 PROCEDURE — 250N000013 HC RX MED GY IP 250 OP 250 PS 637: Performed by: PHYSICIAN ASSISTANT

## 2024-10-10 PROCEDURE — 250N000013 HC RX MED GY IP 250 OP 250 PS 637: Performed by: INTERNAL MEDICINE

## 2024-10-10 RX ORDER — METHOCARBAMOL 500 MG/1
500 TABLET, FILM COATED ORAL EVERY 8 HOURS
Qty: 45 TABLET | Refills: 1 | Status: SHIPPED | OUTPATIENT
Start: 2024-10-10

## 2024-10-10 RX ORDER — AMLODIPINE BESYLATE 2.5 MG/1
2.5 TABLET ORAL DAILY
DISCHARGE
Start: 2024-10-11 | End: 2024-10-10

## 2024-10-10 RX ORDER — AMLODIPINE BESYLATE 2.5 MG/1
2.5 TABLET ORAL DAILY
Qty: 60 TABLET | Refills: 3 | Status: SHIPPED | OUTPATIENT
Start: 2024-10-11

## 2024-10-10 RX ORDER — ACETAMINOPHEN 325 MG/1
650 TABLET ORAL EVERY 4 HOURS PRN
Qty: 60 TABLET | Refills: 0 | DISCHARGE
Start: 2024-10-10

## 2024-10-10 RX ORDER — ACETAMINOPHEN 325 MG/1
650 TABLET ORAL EVERY 4 HOURS PRN
DISCHARGE
Start: 2024-10-10 | End: 2024-10-10

## 2024-10-10 RX ORDER — METHOCARBAMOL 500 MG/1
500 TABLET, FILM COATED ORAL EVERY 8 HOURS
DISCHARGE
Start: 2024-10-10 | End: 2024-10-10

## 2024-10-10 RX ORDER — LIDOCAINE 4 G/G
1 PATCH TOPICAL EVERY 24 HOURS
Qty: 20 PATCH | Refills: 0 | Status: SHIPPED | OUTPATIENT
Start: 2024-10-10

## 2024-10-10 RX ORDER — ONDANSETRON 4 MG/1
4 TABLET, ORALLY DISINTEGRATING ORAL EVERY 6 HOURS PRN
Qty: 10 TABLET | Refills: 0 | DISCHARGE
Start: 2024-10-10

## 2024-10-10 RX ORDER — AMOXICILLIN 250 MG
1 CAPSULE ORAL 2 TIMES DAILY PRN
Qty: 30 TABLET | Refills: 2 | DISCHARGE
Start: 2024-10-10

## 2024-10-10 RX ORDER — LIDOCAINE 4 G/G
1 PATCH TOPICAL EVERY 24 HOURS
DISCHARGE
Start: 2024-10-10 | End: 2024-10-10

## 2024-10-10 RX ORDER — ONDANSETRON 4 MG/1
4 TABLET, ORALLY DISINTEGRATING ORAL EVERY 6 HOURS PRN
DISCHARGE
Start: 2024-10-10 | End: 2024-10-10

## 2024-10-10 RX ORDER — AMOXICILLIN 250 MG
1 CAPSULE ORAL 2 TIMES DAILY PRN
DISCHARGE
Start: 2024-10-10 | End: 2024-10-10

## 2024-10-10 RX ORDER — CALCIUM CARBONATE 500 MG/1
1 TABLET, CHEWABLE ORAL 4 TIMES DAILY PRN
DISCHARGE
Start: 2024-10-10

## 2024-10-10 RX ADMIN — ACETAMINOPHEN 650 MG: 325 TABLET ORAL at 00:14

## 2024-10-10 RX ADMIN — AMLODIPINE BESYLATE 2.5 MG: 2.5 TABLET ORAL at 07:53

## 2024-10-10 RX ADMIN — LIDOCAINE 1 PATCH: 4 PATCH TOPICAL at 07:53

## 2024-10-10 RX ADMIN — ACETAMINOPHEN 650 MG: 325 TABLET ORAL at 04:33

## 2024-10-10 RX ADMIN — METHOCARBAMOL 500 MG: 500 TABLET ORAL at 04:33

## 2024-10-10 RX ADMIN — ACETAMINOPHEN 650 MG: 325 TABLET ORAL at 08:45

## 2024-10-10 ASSESSMENT — ACTIVITIES OF DAILY LIVING (ADL)
ADLS_ACUITY_SCORE: 43

## 2024-10-10 NOTE — PROGRESS NOTES
Care Management Discharge Note    Discharge Date: 10/10/2024       Discharge Disposition: [facility] Transitional Care  Discharge Services:  therapies  Discharge Transportation: family     Private pay costs discussed: private room/amenity fees and transportation costs    Does the patient's insurance plan have a 3 day qualifying hospital stay waiver?  Yes   Which insurance plan 3 day waiver is available? Alternative insurance waiver  Will the waiver be used for post-acute placement? No    PAS Confirmation Code: [id number]  Patient/family educated on Medicare website which has current facility and service quality ratings:  yes    Education Provided on the Discharge Plan:  yes  Persons Notified of Discharge Plans: patient, family, bedside RN, charge, hospitalist  Patient/Family in Agreement with the Plan: yes    Handoff Referral Completed: No, handoff not indicated or clinically appropriate    Additional Information:  Patient is medically ready today to discharge to TCU. Exchanged some texts today with daughter Haleigh, who is expressing a lot of fear about moving her mom to TCU and the future. SW validated emotions and let her know we unfortunately don't have alternative placement or a different plan. [facility]. Orders faxed to facility via [employer] at 0855.    ADD: 1200  Received texts and calls from patient's [facility] She explains that the room is very dark and things seem rundown, but the people seemed very kind and welcoming. Discussed the entire appeal process, she said they are appealing. Spoke with her a bit later and explained that if they appeal, we do not have any other TCU bed. Discussed how to advocate for themselves in a SNF, making reports to cirilo, being involved at the SNF, and putting a lot of energy into finding [facility]/memory care. She said they are very determined and will  have a place in a few days, finances are no issue. Haleigh was emotional, tearful, and overwhelmed. Explained the full process for getting into [facility]/memory care which she found helpful. Gave Senior Linkage line info. She understands that it is best to go to TCU now then find [facility]. They will discharge.    VIKA Ngo, [facility]   Social Work   [facility]

## 2024-10-10 NOTE — PLAN OF CARE
Reason for Admission: L frontoparietal SDH s/p unwitnessed fall, L clavicular fracture  Cognitive/Mentation: A/Ox 3-4, forgetful  Neuros/CMS: Stable w/ mild aphasia, LUE weakness d/t fx, 2/5 strength.   VS: VSS on RA, SBP <150.   Tele: NSR  GI/: BS audible, + flatus, no BM. Purewick overnight per pt request.   Pulmonary: LS clear  Pain: L shoulder/rib pain- prn Tylenol and scheduled Robaxin given.   Drains/Lines: R PIV SL  Skin: Scattered bruising on L shoulder/ribs. LUE in cast w/ sling.    Activity: Assist x 1 with GB/W.  Diet: Regular diet with thin liquids. Takes pills whole.   Therapies recs: TCU  Discharge: Plan to discharge today at 1200.   Aggression Stoplight Tool: Green  End of shift summary: No changes this shift.

## 2024-10-10 NOTE — DISCHARGE SUMMARY
[facility]    Discharge Summary  Hospitalist    Date of Admission:  10/6/2024  Date of Discharge:  10/10/2024    Discharge Diagnoses      Subdural hematoma (H)  Fall, initial encounter  Closed nondisplaced fracture of left clavicle, unspecified part of clavicle, initial encounter  Traumatic rhabdomyolysis, initial encounter (H)    History of Present Illness   Connie Greenwood is an 79 year old female who presented with fall and subdural hematoma     Hospital Course   Connie Greenwood was admitted on 10/6/2024.  The following problems were addressed during her hospitalization:    Connie Greenwood is a 79 year old female with PMH of HTN, HLD, dementia who presented to the ED on 10/6/24 for evaluation after a fall at home.     Patient's son found patient on the ground this morning. He saw her last night and she was noted to be wearing the same clothing. Patient has underlying dementia and is unable to recall any details surrounding her fall or if she passed out. She has left shoulder pain and says she bumped her head, but denies any headache or any other pain. Denies chest pain, dyspnea, palpitations, fevers, chills, dysuria. She admits to visual hallucinations, unclear duration, per family relatively new today although she did have some increased confusion last hospitalization associated with narcotics. She has not received any narcotics today.     In the ED, VSS. CBC with WBC 14.0. BMP unremarkable. UA bland. CT head with new subdural hematoma along the left frontoparietal convexity with mild mass effect on the frontal and parietal lobe and 3 mm rightward midline shift. Left shoulder xray showed a non displaced fracture of the distal aspect of the left clavicle.     *Note, patient was recently hospitalized at [facility] 9/15/24 - 9/17/24 for fall with possible syncope. She sustained two acute left sided rib fractures, left distal radius fracture, scalp laceration. CT head NAD. Syncope was felt to  possibly be vasovagal due to hot weather/dehydration.     Acute subdural hematoma  HTN  *CT head shows acute subdural hematoma along the left frontoparietal convexity measuring up to 17 mm with 3 mm rightward midline shift. No neuro deficits on exam.  - Neurosurgery consult.  Appreciate recommendations  - Goal SBP <150, IV Hydralazine available PRN  - Given her recurrent falls, will stop Spironolactone and start Norvasc 2.5 mg daily  - Keep HOB >30 degrees  - Neurochecks and vitals q2h  - Repeat CT head in 6 hours continue to remain stable.  Conservative management per neurosurgery.  -Follow-up in neurosurgery clinic in 4 weeks with repeat head CT.  Also repeat head CT with any worsening symptoms including worsening right-sided weakness or mental status changes.  No blood thinners including aspirin or ibuprofen until follow-up.  Neurosurgery signed off.     Left clavicle fracture  - Sling for comfort. Okay to remove for hygiene, skin checks, to apply cold compresses, to allow gentle traction by gravity to the fracture to aid in better alignment, and to complete ROM as below per ortho   - Acetaminophen as needed  - Routine orthopedic consult.  Appreciate recommendations  - PT/OT eval .  Recommending TCU.  Per Ortho  - No lifting more than a full coffee cup with the LUE. Okay for use of platform walker if needed.   - Okay to progress to Codman's exercises of the left shoulder as pain allows, likely in the next several days to a week. Encourage frequent ROM of the elbow and digits to prevent stiffness   -Neurovascular checks every 4 hours while inpatient.  -Follow-up with Dr. contreras at [facility] in 4 weeks with repeat x-rays and reevaluate left clavicle and rest.Please call [phone] to schedule.      Recurrent falls  Rhabdomyolysis  *Unclear etiology of fall or if any associated syncope. Note that patient was recently hospitalized for a syncopal episode 2 weeks ago, felt to be vasovagal due to hot weather and  dehydration. Patient is unable to recall the events surrounding her most recent fall.  *Patient lives alone in independent living, currently had no HH services. Her family checks in on her regularly.  *Echo 9/17/24 with EF 60-65%, mild tricuspid regurgitation  - Total CK level mildly elevated at 1,186 -> received 1 L IV fluid overnight.  Stopped IV fluids due to supply issues.  Recheck CK at 1190.  Continue to hold fluids for now and recheck in the evening.  If it is still elevated will consider restarting IV fluids.  Will encourage oral intake.  - Stop PTA Spironolactone, start Norvasc 2.5 mg daily for BP control  -Hold for SBP less than 120  - Continuous telemetry  - PT/OT evaluation-recommending TCU.  -  consult for discharge planning  - Zio patch ordered on discharge      Increased confusion in the setting of dementia  *Oriented x 3 but forgetful at baseline. She is currently oriented to self and knew she was in the hospital, but not which. Disoriented to date. She has been having visual hallucinations for the past 4 weeks which may be related to her head injury and dementia.  *UA is bland. WBC 14 but no s/s infection, favor stress reaction.  - Continue PTA Aricept  - Delirium precautions     Recent left 7th and 8th rib fractures  *Diagnosed 9/15/24 after fall at home  - Lidoderm patch PRN  - Acetaminophen PRN  - robaxin tid      Recent distal left radius fracture  *Diagnosed 9/15/24 after a fall at home. Seen by orthopedics last admission and was placed in a splint, conservative management recommended.  - Continue OP orthopedics follow up     HLD  - Continue PTA Simvastatin        Clinically Significant Risk Factors               # Hypoalbuminemia: Lowest albumin = 3.3 g/dL at 10/7/2024  7:28 AM, will monitor as appropriate     # Hypertension: Noted on problem list    # Dementia: noted on problem list        # Overweight: Estimated body mass index is 27.15 kg/m  as calculated from the following:    Height as  "of this encounter: 1.676 m (5' 6\").    Weight as of this encounter: 76.3 kg (168 lb 3.4 oz)., PRESENT ON ADMISSION     # Financial/Environmental Concerns: none          Maria Del Carmen Ramos MD, MD        Code Status   Full Code       Primary Care Physician   Jae Mendiola    Physical Exam   Temp: 98.2  F (36.8  C) Temp src: Oral BP: 120/66 Pulse: 59   Resp: 21 SpO2: 98 % O2 Device: None (Room air)    Vitals:    10/06/24 1054 10/07/24 0806   Weight: 73.5 kg (162 lb) 76.3 kg (168 lb 3.4 oz)     Vital Signs with Ranges  Temp:  [97.8  F (36.6  C)-98.2  F (36.8  C)] 98.2  F (36.8  C)  Pulse:  [59-74] 59  Resp:  [17-21] 21  BP: (112-136)/(60-86) 120/66  SpO2:  [97 %-99 %] 98 %  I/O last 3 completed shifts:  In: 120 [P.O.:120]  Out: 700 [Urine:700]    Physical Exam  Constitutional:       Appearance: Normal appearance.   Cardiovascular:      Rate and Rhythm: Normal rate and regular rhythm.      Pulses: Normal pulses.      Heart sounds: Normal heart sounds.   Pulmonary:      Effort: Pulmonary effort is normal. No respiratory distress.      Breath sounds: Normal breath sounds.   Abdominal:      General: Abdomen is flat. Bowel sounds are normal. There is no distension.      Tenderness: There is no abdominal tenderness. There is no guarding.   Musculoskeletal:      Comments: Left shoulder in sling    Skin:     General: Skin is warm and dry.   Neurological:      General: No focal deficit present.           Discharge Disposition   Discharged to short-term care facility  Condition at discharge: Stable    Consultations This Hospital Stay   ORTHOPEDIC SURGERY IP CONSULT  CARE MANAGEMENT / SOCIAL WORK IP CONSULT  PHYSICAL THERAPY ADULT IP CONSULT  OCCUPATIONAL THERAPY ADULT IP CONSULT  PHYSICAL THERAPY ADULT IP CONSULT  OCCUPATIONAL THERAPY ADULT IP CONSULT    Time Spent on this Encounter   IMaria Del Carmen MD, personally saw the patient today and spent greater than 30 minutes discharging this patient.    Discharge Orders    "   CT Head w/o Contrast     Follow Up and recommended labs and tests    Please call as soon as possible to make an appointment to be seen in Dr. Chad You's clinic at [facility] in 4 weeks (~11/1/24) for repeat x-rays of the left clavicle and left wrist and possible cast removal.     Please call Dr. You's team at [phone] to schedule an appointment.       Dr. You sees patients at 3 clinic locations:  [facility]  [address], [place] [address]    [facility]  [address] [place] [address]    [facility]  [address], [place] [address]      Please call the on-call phone number [phone] during evenings, nights and weekends for any urgent needs.     Additional Discharge Instructions    -Remain in left short arm cast at all times. This must remain clean and dry.  -Sling for comfort. Okay to remove for hygiene, skin checks, to apply cold compresses, and to complete ROM as below.   -No lifting more than a full coffee cup with the LUE. Okay for use of platform walker if needed.  -Okay to progress to Codman's exercises of the left shoulder as pain allows, likely in the next several days to a week. Encourage frequent ROM of the elbow and digits to prevent stiffness.     General info for SNF    Length of Stay Estimate: Short Term Care: Estimated # of Days <30  Condition at Discharge: Stable  Level of care:skilled   Rehabilitation Potential: Good  Admission H&P remains valid and up-to-date: Yes  Recent Chemotherapy: N/A  Use Nursing Home Standing Orders: Yes     Mantoux instructions    Give two-step Mantoux (PPD) Per Facility Policy Yes     Reason for your hospital stay    Repeated falls     Activity - Up ad shayy    No lifting more than a full coffee cup with the LUE. Okay for use of platform walker if needed.   - Okay to progress to Codman's exercises of the left shoulder as pain  allows, likely in the next several days to a week. Encourage frequent ROM of the elbow and digits to prevent stiffness     Follow Up and recommended labs and tests    Follow up with custodial physician.  The following labs/tests are recommended: cbc, bmp in 1 week.  Follow-up with Dr. contreras at [facility] in 4 weeks with repeat x-rays and reevaluate left clavicle and rest.Please call [phone] to schedule.  Recommend follow-up with neurosurgery clinic in 4 weeks with repeat head CT scan.  If the patient experiences any worsening symptoms in that time particular right-sided weakness, or mental status changes, she should contact our clinic or present to the emergency department for repeat head CT scan.     Physical Therapy Adult Consult    Evaluate and treat as clinically indicated.    Reason:  weakness     Occupational Therapy Adult Consult    Evaluate and treat as clinically indicated.    Reason:  weakness     Fall precautions     Fall precautions     Wrist/Arm/Hand Bracing Supplies Order Sling; Left    Bracing Documentation:   Patient requires the use of the ordered bracing device due to following medical need/condition: left clavicle fracture.    I, the undersigned, certify that the above prescribed supplies are medically necessary for this patient and is both reasonable and necessary in reference to accepted standards of medical and necessary in reference to accepted standards of medical practice in the treatment of this patient's condition and is not prescribed as a convenience.     Diet    Follow this diet upon discharge: Current Diet:Orders Placed This Encounter      Room Service      Regular Diet Adult     Discharge Medications   Current Discharge Medication List        START taking these medications    Details   amLODIPine (NORVASC) 2.5 MG tablet Take 1 tablet (2.5 mg) by mouth daily.    Associated Diagnoses: Subdural hematoma (H)      calcium carbonate (TUMS) 500 MG chewable tablet Take 1 tablet (500  mg) by mouth 4 times daily as needed for heartburn.    Associated Diagnoses: Subdural hematoma (H)      Lidocaine (LIDOCARE) 4 % Patch Place 1 patch over 12 hours onto the skin every 24 hours. To prevent lidocaine toxicity, patient should be patch free for 12 hrs daily.    Associated Diagnoses: Subdural hematoma (H)      methocarbamol (ROBAXIN) 500 MG tablet Take 1 tablet (500 mg) by mouth every 8 hours.    Associated Diagnoses: Subdural hematoma (H)      ondansetron (ZOFRAN ODT) 4 MG ODT tab Take 1 tablet (4 mg) by mouth every 6 hours as needed for nausea or vomiting.    Associated Diagnoses: Subdural hematoma (H)      senna-docusate (SENOKOT-S/PERICOLACE) 8.6-50 MG tablet Take 1 tablet by mouth 2 times daily as needed for constipation.    Associated Diagnoses: Subdural hematoma (H)           CONTINUE these medications which have CHANGED    Details   acetaminophen (TYLENOL) 325 MG tablet Take 2 tablets (650 mg) by mouth every 4 hours as needed for mild pain or other (and adjunct with moderate or severe pain or per patient request).    Associated Diagnoses: Subdural hematoma (H)           CONTINUE these medications which have NOT CHANGED    Details   diphenhydrAMINE-acetaminophen (TYLENOL PM)  MG tablet Take 1 tablet by mouth nightly as needed for sleep.      donepezil (ARICEPT) 5 MG tablet Take 1 tablet (5 mg) by mouth daily.  Qty: 30 tablet, Refills: 0    Associated Diagnoses: Dementia without behavioral disturbance, psychotic disturbance, mood disturbance, or anxiety, unspecified dementia severity, unspecified dementia type (H)      latanoprost (XALATAN) 0.005 % ophthalmic solution Place 1 drop into both eyes At Bedtime      multivitamin w/minerals (THERA-VIT-M) tablet Take 1 tablet by mouth daily.      polyethylene glycol (MIRALAX) 17 GM/Dose powder Take 17 g by mouth daily as needed for constipation.      simvastatin (ZOCOR) 20 MG tablet Take 20 mg by mouth At Bedtime           STOP taking these  medications       ibuprofen (ADVIL/MOTRIN) 200 MG tablet Comments:   Reason for Stopping:         spironolactone (ALDACTONE) 25 MG tablet Comments:   Reason for Stopping:             Allergies   No Known Allergies  Data   Recent Labs   Lab Test 10/07/24  0728 10/06/24  1106 09/15/24  1915   WBC 6.5 14.0* 5.4   HGB 11.9 12.7 12.6   MCV 94 94 95    252 183      Recent Labs   Lab Test 10/07/24  0728 10/06/24  1106 09/17/24  0817    140 140   POTASSIUM 4.1 4.0 4.7   CHLORIDE 108* 101 107   CO2 23 25 26   BUN 14.8 20.0 10.7   CR 0.64 0.74 0.59   ANIONGAP 9 14 7   ISAURO 8.5* 9.1 8.4*   GLC 89 99 97         Results for orders placed or performed during the hospital encounter of 10/06/24   CT Head w/o Contrast    Narrative    EXAM: CT HEAD W/O CONTRAST  LOCATION: [facility]  DATE: 10/6/2024    INDICATION: fall  COMPARISON: 9/15/2024.  TECHNIQUE: Routine CT Head without IV contrast. Multiplanar reformats. Dose reduction techniques were used.    FINDINGS:  INTRACRANIAL CONTENTS: Left frontoparietal subdural hematoma measuring up to 17 mm in thickness, new compared to 9/15/2024. The hematoma is overall hypodense relative to the adjacent brain parenchyma, although there are more hyperdense layering blood   products posteriorly. Mild mass effect on the left frontal and parietal lobes with 3 mm rightward midline shift. No CT evidence of acute infarct. Mild presumed chronic small vessel ischemic changes. Mild generalized volume loss. No hydrocephalus. J Carlos   cisterna magna is incidentally noted.    VISUALIZED ORBITS/SINUSES/MASTOIDS: No intraorbital abnormality. No paranasal sinus mucosal disease. No middle ear or mastoid effusion.    BONES/SOFT TISSUES: No acute abnormality.      Impression    IMPRESSION:  1.  New subdural hematoma along the left frontoparietal convexity with mild mass effect on the frontal and parietal lobes and 3 mm rightward midline shift.    These findings were  communicated by phone to Dr. Galeas on 10/6/2024 12:01 PM CDT.     XR Shoulder Left G/E 3 Views    Narrative    EXAM: XR SHOULDER LEFT G/E 3 VIEWS  LOCATION: [facility]  DATE: 10/6/2024    INDICATION: left arm pain  COMPARISON: None.      Impression    IMPRESSION: There is a nondisplaced fracture of the distal aspect of the left clavicle. The AC joint is intact with mild degenerative change. The glenohumeral joint is negative for fracture or dislocation.   CT Head w/o Contrast    Narrative    EXAM: CT HEAD W/O CONTRAST  LOCATION: [facility]  DATE: 10/6/2024    INDICATION: SDH  COMPARISON: CT head 10/6/2024  TECHNIQUE: Routine CT Head without IV contrast. Multiplanar reformats. Dose reduction techniques were used.    FINDINGS:  INTRACRANIAL CONTENTS: Stable and similar left frontoparietal subdural hematoma measures up to 17 mm in maximum thickness. The collection is mixed density with some hyperdense layering products in its dependent portion similar to prior. Mild mass effect,   left to right midline shift 3 mm similar to prior. No CT evidence of acute infarct. Mild presumed chronic small vessel ischemic changes. Mild generalized volume loss. No hydrocephalus.     VISUALIZED ORBITS/SINUSES/MASTOIDS: No intraorbital abnormality. No paranasal sinus mucosal disease. No middle ear or mastoid effusion.    BONES/SOFT TISSUES: No acute abnormality.      Impression    IMPRESSION:  1.  Stable and similar mixed density left frontoparietal convexity subdural hematoma measures up to 17 mm in maximum thickness.  2.  Stable left-to-right 3 mm midline shift.   XR Chest 2 Views    Narrative    CHEST TWO VIEWS  10/7/2024 3:33 PM     HISTORY: 79-year-old woman with question of new rib fracture.       Impression    IMPRESSION: Since September 17, 2024, heart size is normal. No acute  cardiopulmonary process. Somewhat difficult to visualized rib  fracture. No obvious fracture  identified.    JUAN ELIZALDE MD         SYSTEM ID:  [id number]

## 2024-10-10 NOTE — PLAN OF CARE
Physical Therapy Discharge Summary    Reason for therapy discharge:    Discharged to transitional care facility.    Progress towards therapy goal(s). See goals on Care Plan in [facility] electronic health record for goal details.  Goals partially met.  Barriers to achieving goals:   discharge from facility.    Therapy recommendation(s):    Continued therapy is recommended.  Rationale/Recommendations:  Patient would benefit from continued skilled PT to progress activity tolerance and independence prior to discharging to home alone.    Goal Outcome Evaluation:

## 2024-10-10 NOTE — PLAN OF CARE
Goal Outcome Evaluation:      Plan of Care Reviewed With: patient, child          Outcome Evaluation: Patient discharging today to TCU.    VIKA Ngo, LGSW   Social Work   [facility]

## 2024-10-10 NOTE — PLAN OF CARE
Occupational Therapy Discharge Summary    Reason for therapy discharge:    Discharged to transitional care facility.    Progress towards therapy goal(s). See goals on Care Plan in [facility] electronic health record for goal details.  Goals partially met.  Barriers to achieving goals:   discharge from facility.    Therapy recommendation(s):      Continued therapy is recommended.  Rationale/Recommendations:   .OT Rationale for DC Rec: Pt below baseline, limited by deficits in balance and precautions with clavicle fx. Pt pleasantly forgetful. Unable to return home alone. Anticipate TCU stay is needed for balance, strengthening and management of clavicle fx. Pt likely needing more supportive living environemnt post TCU stay d/t multiple falls with hospital stays and progressive dementia.    Writing therapist did not treat pt, note written based on previous treating therapist notes and recommendations. Please see chart and flow sheet for additional details.

## 2024-10-10 NOTE — PLAN OF CARE
Goal Outcome Evaluation:           Overall Patient Progress: improvingOverall Patient Progress: improving         Neuros stable. Confused, disorientated to date. LUE weak due to fracture. Tylenol & ice for pain. On scheduled robaxin. Up to bathroom with assist 1, gait belt, walker. Discharge instructions reviewed and questions answered. Patient to discharge to TCU via family.

## 2024-10-11 ENCOUNTER — PATIENT OUTREACH (OUTPATIENT)
Dept: CARE COORDINATION | Facility: CLINIC | Age: 79
End: 2024-10-11
Payer: COMMERCIAL

## 2024-10-11 NOTE — PROGRESS NOTES
Connected Care Resource Center: [facility]    Background: Transitional Care Management program identified per system criteria and reviewed by [facility] team for possible outreach.    Assessment: Upon chart review, CCRC Team member will not proceed with patient outreach related to this episode of Transitional Care Management program due to reason below:    Non-[facility] TCU: CCRC team member noted patient discharged to TCU/ARU/LTACH. Patient is not established with a [facility] currently supported by Primary Care-Care Coordination therefore handoff to Primary Care-Care Coordination is not appropriate at this time.    Plan: Transitional Care Management episode addressed appropriately per reason noted above.      Alison Guzman MA  Connected Care Resource [place], [facility]    *Connected Care Resource Team does NOT follow patient ongoing. Referrals are identified based on internal discharge reports and the outreach is to ensure patient has an understanding of their discharge instructions.

## 2024-11-07 ENCOUNTER — ANCILLARY PROCEDURE (OUTPATIENT)
Dept: CT IMAGING | Facility: CLINIC | Age: 79
End: 2024-11-07
Attending: PHYSICIAN ASSISTANT
Payer: COMMERCIAL

## 2024-11-07 DIAGNOSIS — S06.5XAA SUBDURAL HEMATOMA (H): ICD-10-CM

## 2024-11-07 PROCEDURE — 70450 CT HEAD/BRAIN W/O DYE: CPT

## 2024-11-08 ENCOUNTER — TELEPHONE (OUTPATIENT)
Dept: NEUROSURGERY | Facility: CLINIC | Age: 79
End: 2024-11-08
Payer: COMMERCIAL

## 2024-11-08 DIAGNOSIS — S06.5XAA SUBDURAL HEMATOMA (H): Primary | ICD-10-CM

## 2024-11-08 PROCEDURE — 93248 EXT ECG>7D<15D REV&INTERPJ: CPT | Performed by: INTERNAL MEDICINE

## 2024-11-08 NOTE — TELEPHONE ENCOUNTER
Per provider, let the patient know the following     Head CT is stable, unchanged from prior.    Recommended repeat head CT scan in 6 weeks from now. Patient was not on anticoagulation prior.    Should not be on any antiplatelet medications or aspirin, also should avoid ibuprofen.    We can call with results at that time     Called pt to relay   Reviewed results and rec's regarding medication  Ordered CT   Scheduling will call to schedule

## 2024-11-09 NOTE — RESULT ENCOUNTER NOTE
Posthospitalization Zio results reviewed-here for subdural hematoma-sinus with 166 runs of SVT.  Forwarded for further review to Dr. Rusty Arriaza, [facility] with pending appointment 11/15

## 2024-11-14 ENCOUNTER — DOCUMENTATION ONLY (OUTPATIENT)
Dept: GERIATRICS | Facility: CLINIC | Age: 79
End: 2024-11-14
Payer: COMMERCIAL

## 2024-11-18 ENCOUNTER — ASSISTED LIVING VISIT (OUTPATIENT)
Dept: GERIATRICS | Facility: CLINIC | Age: 79
End: 2024-11-18
Payer: COMMERCIAL

## 2024-11-18 VITALS
DIASTOLIC BLOOD PRESSURE: 70 MMHG | WEIGHT: 162 LBS | HEART RATE: 72 BPM | TEMPERATURE: 96.5 F | RESPIRATION RATE: 18 BRPM | BODY MASS INDEX: 26.15 KG/M2 | SYSTOLIC BLOOD PRESSURE: 123 MMHG

## 2024-11-18 RX ORDER — DONEPEZIL HYDROCHLORIDE 5 MG/1
5 TABLET, FILM COATED ORAL
COMMUNITY

## 2024-11-18 RX ORDER — ACETAMINOPHEN 500 MG
1000 TABLET ORAL EVERY 6 HOURS PRN
COMMUNITY

## 2024-11-18 NOTE — LETTER
11/18/2024      Connie Greenwood  [address]  [facility] [address]        [facility] GERIATRICS    PRIMARY CARE PROVIDER AND CLINIC:  KATHI Ferreira [facility], [address] [place] [address]***  Chief Complaint   Patient presents with    [facility] Medical Record Number:  [id number]  Place of Service where encounter took place:  [facility] ([facility]) [[id number]]    Connie Greenwood  is a 79 year old  (1945), {fgsinitialoption:896358}.   HPI:    ***    CODE STATUS/ADVANCE DIRECTIVES DISCUSSION:  Prior  {CODE STATUS:217589}  ALLERGIES: No Known Allergies   PAST MEDICAL HISTORY:   Past Medical History:   Diagnosis Date    Hypertension     Sleep apnea     Wears a mouth piece      PAST SURGICAL HISTORY:   has a past surgical history that includes Cholecystectomy and Davinci pelvic procedure (N/A, 3/31/2023).  FAMILY HISTORY: family history is not on file.  SOCIAL HISTORY:   reports that she has never smoked. She has never used smokeless tobacco. She reports current alcohol use. She reports that she does not use drugs.  Patient's living condition: {LIVES WITH (NURSING HOME):837608}    Post Discharge Medication Reconciliation Status:   MED REC REQUIRED{TIP  Click the link below to document or use med rec list, use list to pull in response :939861}  Post Medication Reconciliation Status: {MED REC LIST:643963}       Current Outpatient Medications   Medication Sig Dispense Refill    acetaminophen (TYLENOL) 325 MG tablet Take 2 tablets (650 mg) by mouth every 4 hours as needed for mild pain or other (and adjunct with moderate or severe pain or per patient request). 60 tablet 0    amLODIPine (NORVASC) 2.5 MG tablet Take 1 tablet (2.5 mg) by mouth daily. 60 tablet 3    calcium carbonate (TUMS) 500 MG chewable tablet Take 1 tablet (500 mg) by mouth 4 times daily as needed for heartburn.      diphenhydrAMINE-acetaminophen (TYLENOL PM)  MG tablet  Take 1 tablet by mouth nightly as needed for sleep.      latanoprost (XALATAN) 0.005 % ophthalmic solution Place 1 drop into both eyes At Bedtime      Lidocaine (LIDOCARE) 4 % Patch Place 1 patch over 12 hours onto the skin every 24 hours. To prevent lidocaine toxicity, patient should be patch free for 12 hrs daily. 20 patch 0    methocarbamol (ROBAXIN) 500 MG tablet Take 1 tablet (500 mg) by mouth every 8 hours. 45 tablet 1    multivitamin w/minerals (THERA-VIT-M) tablet Take 1 tablet by mouth daily.      ondansetron (ZOFRAN ODT) 4 MG ODT tab Take 1 tablet (4 mg) by mouth every 6 hours as needed for nausea or vomiting. 10 tablet 0    polyethylene glycol (MIRALAX) 17 GM/Dose powder Take 17 g by mouth daily as needed for constipation.      senna-docusate (SENOKOT-S/PERICOLACE) 8.6-50 MG tablet Take 1 tablet by mouth 2 times daily as needed for constipation. 30 tablet 2    simvastatin (ZOCOR) 20 MG tablet Take 20 mg by mouth At Bedtime       No current facility-administered medications for this visit.       ROS:  {ROS FGS:451199}    Vitals:  There were no vitals taken for this visit.  Exam:  {long term physical exam :355318}    Lab/Diagnostic data:  {fgslab:207552}    ASSESSMENT/PLAN:    {FGS DX2:003549}    Orders:  {fgsorders:774283}  ***    Electronically signed by:  Bibiana Knott ***                     Sincerely,        KATHI Ferreira CNP

## 2024-11-18 NOTE — PROGRESS NOTES
[facility]    PRIMARY CARE PROVIDER AND CLINIC:  Hero Good, APRN CNP, [address] / [facility] [address]***  Chief Complaint   Patient presents with    [facility] Medical Record Number:  [id number]  Place of Service where encounter took place:  [facility] [place] [facility] ([facility]) [[id number]]    Connie Greenwood  is a 79 year old  (1945), admitted to the above facility from home due to care needs ***. .   HPI:    ***    CODE STATUS/ADVANCE DIRECTIVES DISCUSSION:  Prior  CPR/Full code   ALLERGIES: No Known Allergies   PAST MEDICAL HISTORY:   Past Medical History:   Diagnosis Date    Hypertension     Sleep apnea     Wears a mouth piece      PAST SURGICAL HISTORY:   has a past surgical history that includes Cholecystectomy and Davinci pelvic procedure (N/A, 3/31/2023).  FAMILY HISTORY: family history is not on file.  SOCIAL HISTORY:   reports that she has never smoked. She has never used smokeless tobacco. She reports current alcohol use. She reports that she does not use drugs.  Patient's living condition: {LIVES WITH (NURSING HOME):528080}    Post Discharge Medication Reconciliation Status:   MED REC REQUIRED{TIP  Click the link below to document or use med rec list, use list to pull in response :620655}  Post Medication Reconciliation Status: {MED REC LIST:249580}       Current Outpatient Medications   Medication Sig Dispense Refill    acetaminophen (TYLENOL) 500 MG tablet Take 1,000 mg by mouth every 6 hours as needed for mild pain.      donepezil (ARICEPT) 5 MG tablet Take 5 mg by mouth at bedtime.      acetaminophen (TYLENOL) 325 MG tablet Take 2 tablets (650 mg) by mouth every 4 hours as needed for mild pain or other (and adjunct with moderate or severe pain or per patient request). (Patient not taking: Reported on 11/18/2024) 60 tablet 0    amLODIPine (NORVASC) 2.5 MG tablet Take 1 tablet (2.5 mg) by mouth daily. 60 tablet 3    calcium carbonate  (TUMS) 500 MG chewable tablet Take 1 tablet (500 mg) by mouth 4 times daily as needed for heartburn. (Patient not taking: Reported on 11/18/2024)      diphenhydrAMINE-acetaminophen (TYLENOL PM)  MG tablet Take 1 tablet by mouth nightly as needed for sleep.      latanoprost (XALATAN) 0.005 % ophthalmic solution Place 1 drop into both eyes At Bedtime      Lidocaine (LIDOCARE) 4 % Patch Place 1 patch over 12 hours onto the skin every 24 hours. To prevent lidocaine toxicity, patient should be patch free for 12 hrs daily. (Patient not taking: Reported on 11/18/2024) 20 patch 0    methocarbamol (ROBAXIN) 500 MG tablet Take 1 tablet (500 mg) by mouth every 8 hours. (Patient not taking: Reported on 11/18/2024) 45 tablet 1    multivitamin w/minerals (THERA-VIT-M) tablet Take 1 tablet by mouth daily.      ondansetron (ZOFRAN ODT) 4 MG ODT tab Take 1 tablet (4 mg) by mouth every 6 hours as needed for nausea or vomiting. (Patient not taking: Reported on 11/18/2024) 10 tablet 0    polyethylene glycol (MIRALAX) 17 GM/Dose powder Take 17 g by mouth daily as needed for constipation.      senna-docusate (SENOKOT-S/PERICOLACE) 8.6-50 MG tablet Take 1 tablet by mouth 2 times daily as needed for constipation. 30 tablet 2    simvastatin (ZOCOR) 20 MG tablet Take 20 mg by mouth At Bedtime       No current facility-administered medications for this visit.       ROS:  {ROS FGS:801611}    Vitals:  /70   Pulse 72   Temp (!) 96.5  F (35.8  C)   Resp 18   Wt 73.5 kg (162 lb)   BMI 26.15 kg/m    Exam:  {Nursing home physical exam :998960}    Lab/Diagnostic data:  {fgslab:350591}    ASSESSMENT/PLAN:    {FGS DX2:833125}    Orders:  {fgsorders:365020}  ***    Electronically signed by:  Bibiana Knott ***

## 2024-11-22 PROBLEM — S52.502D CLOSED FRACTURE OF DISTAL END OF LEFT RADIUS WITH ROUTINE HEALING, UNSPECIFIED FRACTURE MORPHOLOGY, SUBSEQUENT ENCOUNTER: Status: ACTIVE | Noted: 2024-09-15

## 2024-11-25 ENCOUNTER — LAB REQUISITION (OUTPATIENT)
Dept: LAB | Facility: CLINIC | Age: 79
End: 2024-11-25
Payer: COMMERCIAL

## 2024-11-25 ENCOUNTER — ASSISTED LIVING VISIT (OUTPATIENT)
Dept: GERIATRICS | Facility: CLINIC | Age: 79
End: 2024-11-25
Payer: COMMERCIAL

## 2024-11-25 VITALS
BODY MASS INDEX: 25.66 KG/M2 | DIASTOLIC BLOOD PRESSURE: 72 MMHG | HEART RATE: 62 BPM | RESPIRATION RATE: 16 BRPM | SYSTOLIC BLOOD PRESSURE: 128 MMHG | WEIGHT: 159 LBS

## 2024-11-25 DIAGNOSIS — S52.502D CLOSED FRACTURE OF DISTAL END OF LEFT RADIUS WITH ROUTINE HEALING, UNSPECIFIED FRACTURE MORPHOLOGY, SUBSEQUENT ENCOUNTER: ICD-10-CM

## 2024-11-25 DIAGNOSIS — F03.B0 MODERATE DEMENTIA WITHOUT BEHAVIORAL DISTURBANCE, PSYCHOTIC DISTURBANCE, MOOD DISTURBANCE, OR ANXIETY, UNSPECIFIED DEMENTIA TYPE (H): ICD-10-CM

## 2024-11-25 DIAGNOSIS — S42.002D CLOSED NONDISPLACED FRACTURE OF LEFT CLAVICLE WITH ROUTINE HEALING, UNSPECIFIED PART OF CLAVICLE, SUBSEQUENT ENCOUNTER: ICD-10-CM

## 2024-11-25 DIAGNOSIS — E55.9 VITAMIN D DEFICIENCY, UNSPECIFIED: ICD-10-CM

## 2024-11-25 DIAGNOSIS — I10 PRIMARY HYPERTENSION: ICD-10-CM

## 2024-11-25 DIAGNOSIS — S06.5XAA SUBDURAL HEMATOMA (H): Primary | ICD-10-CM

## 2024-11-25 DIAGNOSIS — M85.89 OSTEOPENIA OF MULTIPLE SITES: ICD-10-CM

## 2024-11-25 DIAGNOSIS — Z71.89 ADVANCED DIRECTIVES, COUNSELING/DISCUSSION: ICD-10-CM

## 2024-11-25 DIAGNOSIS — S42.215D CLOSED NONDISPLACED FRACTURE OF SURGICAL NECK OF LEFT HUMERUS WITH ROUTINE HEALING, UNSPECIFIED FRACTURE MORPHOLOGY, SUBSEQUENT ENCOUNTER: ICD-10-CM

## 2024-11-25 PROCEDURE — 99350 HOME/RES VST EST HIGH MDM 60: CPT | Performed by: NURSE PRACTITIONER

## 2024-11-25 RX ORDER — OXYCODONE HYDROCHLORIDE 5 MG/1
5 TABLET ORAL EVERY 6 HOURS PRN
COMMUNITY
End: 2024-11-27

## 2024-11-25 RX ORDER — VITAMIN B COMPLEX
1 TABLET ORAL DAILY
Qty: 30 TABLET | Refills: 11 | Status: SHIPPED | OUTPATIENT
Start: 2024-11-25

## 2024-11-25 RX ORDER — ACETAMINOPHEN 500 MG
1000 TABLET ORAL 2 TIMES DAILY
COMMUNITY

## 2024-11-25 NOTE — PROGRESS NOTES
[facility] GERIATRICS    PRIMARY CARE PROVIDER AND CLINIC:  Hero Good, APRN CNP, [address] [facility] [address]  Chief Complaint   Patient presents with    Hospital F/U      [place] Medical Record Number:  [id number]  Place of Service where encounter took place:  [facility] ([facility]) [[id number]]    Connie Greenwood  is a 79 year old  (1945), returned to the above facility from  [facility] . Hospital stay 11/18/24 - 11/20/24.     HPI:    She moved to this facility 11/1/2024 for a higher level of care due to progressive dementia and frequent falls.   Medical history significant for dementia, HTN, hyperlipidemia, KING, glaucoma.   She is followed by [facility]. Initial consult 8/1/2023, MMSE was 25/30. MRI brain showed mild chronic microvascular changes. Neuropsych testing 10/2023 showed mild cognitive impairment with significant deficits in learning and delayed memory, mild to mod impairment in semantic fluency. Findings were concerning for early neurodegenerative process. Donepezil was started 10/2023.     She was hospitalized at [facility] 9/15-9/17/2024 after falling outside with LOC, possible syncope. She had left sided rib fractures, left distal radius fracture and a scalp laceration that required sutures. Wrist fracture treated nonoperatively with a splint. CT head 9/15/2024 no acute intracranial process. No arrhythmia on telemetry. Discharged home with therapies.   Hospitalized again at [facility] 10/6-10/10/2024 with an unwitnessed fall. Son found her on the floor in the morning, after seeing her the night before. CT head showed acute subdural hematoma.Neurosurgery consulted and recommended conservative management. Spironolactone was dc'd and amlodipine started. She had a left clavicle fracture, managed with a sling for comfort. Hospital course was complicated by delirium and visual hallucinations. She discharged home with family, while  "they looked for AL placement.     She was hospitalized at [facility] 11/18/2024, after she fell while walking outside alone. A passerby picked her up and drove her to the ED. CT head showed acute on chronic subdural hematoma. XR showed a new left humeral fracture and new left radial fracture. Repeat CT head showed stability. Neurosurgery consulted, no intervention indicated. Ortho consulted, fractures managed with a sling and NWB of LUE.     She is a fair historian. Reports she fell while walking to the bank a couple of blocks away, \"got into some construction\" and lost her footing. She had immediate pain of her left shoulder. A man driving by stopped, \"carried me to his  car and drove me to the hospital. I don't even know his name.\" She does not find it odd that she got in a car with a stranger, instead of asking him to call for help. Reports LUE pain is managed with tylenol. Denies other injuries. Denies headache. Reports she's able to get dressed and manage on her own. She frequently laughs and has difficulty with word finding.   Spoke with her daughter Haleigh Johnson, who has noticed cognitive decline over the past several months, including verbal skills. Her mother lacks insight re: her deficits and still wants to drive. She has also declined help from AL staff. Family would like repeat Neuropsych testing.       CODE STATUS/ADVANCE DIRECTIVES DISCUSSION:  Full Code  CPR/Full code   ALLERGIES: No Known Allergies   PAST MEDICAL HISTORY:   Past Medical History:   Diagnosis Date    Dementia (H)     Hyperlipidemia     Hypertension     Sleep apnea     Wears a mouth piece    Subdural hematoma (H)       PAST SURGICAL HISTORY:   has a past surgical history that includes Cholecystectomy and Davinci pelvic procedure (N/A, 3/31/2023).  FAMILY HISTORY: family history includes Endocarditis in her father.  SOCIAL HISTORY:   reports that she has never smoked. She has never used smokeless tobacco. She reports that she does not " currently use alcohol. She reports that she does not use drugs.  Patient's living condition: lives in an assisted living facility    Post Discharge Medication Reconciliation Status:   MED REC REQUIRED  Post Medication Reconciliation Status: discharge medications reconciled and changed, per note/orders       Current Outpatient Medications   Medication Sig Dispense Refill    acetaminophen (TYLENOL) 500 MG tablet Take 1,000 mg by mouth 2 times daily.      acetaminophen (TYLENOL) 500 MG tablet Take 1,000 mg by mouth every 6 hours as needed for mild pain.      amLODIPine (NORVASC) 2.5 MG tablet Take 1 tablet (2.5 mg) by mouth daily. 60 tablet 3    calcium carbonate-vitamin D (OSCAL) 500-5 MG-MCG tablet Take 1 tablet by mouth 2 times daily. 60 tablet 11    donepezil (ARICEPT) 5 MG tablet Take 5 mg by mouth daily (with breakfast).      latanoprost (XALATAN) 0.005 % ophthalmic solution Place 1 drop into both eyes At Bedtime      multivitamin w/minerals (THERA-VIT-M) tablet Take 1 tablet by mouth daily.      oxyCODONE (ROXICODONE) 5 MG tablet Take 5 mg by mouth every 6 hours as needed for severe pain.      polyethylene glycol (MIRALAX) 17 GM/Dose powder Take 17 g by mouth daily as needed for constipation.      senna-docusate (SENOKOT-S/PERICOLACE) 8.6-50 MG tablet Take 1 tablet by mouth 2 times daily as needed for constipation. 30 tablet 2    simvastatin (ZOCOR) 20 MG tablet Take 20 mg by mouth At Bedtime      Vitamin D3 (CHOLECALCIFEROL) 25 mcg (1000 units) tablet Take 1 tablet (25 mcg) by mouth daily. 30 tablet 11     No current facility-administered medications for this visit.       ROS:  4 point ROS including Respiratory, CV, GI and , other than that noted in the HPI,  is negative    Vitals:  /72   Pulse 62   Resp 16   Wt 72.1 kg (159 lb)   BMI 25.66 kg/m    Exam:  GENERAL APPEARANCE:  Alert, in no distress  ENT:  [place]   EYES:  conjunctiva and lids normal  RESP:  lungs clear to auscultation   CV:  regular rate  and rhythm, no murmur, trace ankle edema  ABDOMEN:  soft, non-tender, no distension   M/S:   gait steady. Left wrist in a splint, +CMS fingers. Sling LUE. Good strength of extremities. No joint inflammation   SKIN:  ecchymosis of left upper arm, skin intact  PSYCH:  oriented X 2-3, insight and memory impaired , affect and mood normal    Lab/Diagnostic data:  Recent labs in [facility] reviewed by me today.     ASSESSMENT / PLAN:  (S06.5XAA) Subdural hematoma (H)  (primary encounter diagnosis)  Comment: acute on chronic. Neuro status appears at baseline   Plan: Neurosurgery follow up as scheduled     (J72.244D) Closed fracture of distal end of left radius with routine healing, unspecified fracture morphology, subsequent encounter  (S42.002D) Closed nondisplaced fracture of left clavicle with routine healing, unspecified part of clavicle, subsequent encounter  Closed fracture of left proximal humerus   Comment: all fractures treated nonoperatively. Pain controlled   Plan: continue tylenol. Brace to left wrist, sling to LUE. Follow up [facility].     (M85.89) Osteopenia of multiple sites  Comment: fractures as above. Most recent DEXA was 9/19/2019 and showed osteopenia. Not on calcium-vit D.   Plan:  start calcium, vitamin D 1000 units daily. Check vitamin D level.     (F03.B0) Moderate dementia without behavioral disturbance, psychotic disturbance, mood disturbance, or anxiety, unspecified dementia type (H)  Comment: tends to mask her deficits by making jokes and laughing.   Plan: continue donepezil 5 mg daily. Discontinue tylenol pm due to potential side effects of benadryl. Cognitive testing per home therapies. AL staff assistance with cares, meals, activities, med admin. Daughter plans to follow up with [facility] for Neuropsych testing   Instructed patient that she should not leave the building without supervision-daughter in agreement.     (I10) Primary hypertension  Comment: controlled with BPs:  128/72, 123/70  HR: 62-72  Plan: continue amlodipine 2.5 mg daily     (Z71.89) Advanced directives, counseling/discussion  Comment: daughter confirms Full Code      Electronically signed by:  KATHI Ferreira CNP

## 2024-11-25 NOTE — LETTER
11/25/2024      Connie Greenwood  [address]  [facility] [address]        [facility] GERIATRICS    PRIMARY CARE PROVIDER AND CLINIC:  KATHI Ferreira [facility], [address]. [facility] [address]  Chief Complaint   Patient presents with     Hospital F/U      [facility] Medical Record Number:  [id number]  Place of Service where encounter took place:  [facility] ([facility]) [[id number]]    Connie Greenwood  is a 79 year old  (1945), returned to the above facility from  [facility] . Hospital stay 11/18/24 - 11/20/24.     HPI:    She moved to this facility 11/1/2024 for a higher level of care due to progressive dementia and frequent falls.   Medical history significant for dementia, HTN, hyperlipidemia, KING, glaucoma.   She is followed by [facility]. Initial consult 8/1/2023, MMSE was 25/30. MRI brain showed mild chronic microvascular changes. Neuropsych testing 10/2023 showed mild cognitive impairment with significant deficits in learning and delayed memory, mild to mod impairment in semantic fluency. Findings were concerning for early neurodegenerative process. Donepezil was started 10/2023.     She was hospitalized at [facility] 9/15-9/17/2024 after falling outside with LOC, possible syncope. She had left sided rib fractures, left distal radius fracture and a scalp laceration that required sutures. Wrist fracture treated nonoperatively with a splint. CT head 9/15/2024 no acute intracranial process. No arrhythmia on telemetry. Discharged home with therapies.   Hospitalized again at [facility] 10/6-10/10/2024 with an unwitnessed fall. Son found her on the floor in the morning, after seeing her the night before. CT head showed acute subdural hematoma.Neurosurgery consulted and recommended conservative management. Spironolactone was dc'd and amlodipine started. She had a left clavicle fracture, managed with a sling for comfort. Hospital course was  "complicated by delirium and visual hallucinations. She discharged home with family, while they looked for AL placement.     She was hospitalized at [facility] 11/18/2024, after she fell while walking outside alone. A passerby picked her up and drove her to the ED. CT head showed acute on chronic subdural hematoma. XR showed a new left humeral fracture and new left radial fracture. Repeat CT head showed stability. Neurosurgery consulted, no intervention indicated. Ortho consulted, fractures managed with a sling and NWB of MONAE.     She is a fair historian. Reports she fell while walking to the bank a couple of blocks away, \"got into some construction\" and lost her footing. She had immediate pain of her left shoulder. A man driving by stopped, \"carried me to his  car and drove me to the hospital. I don't even know his name.\" She does not find it odd that she got in a car with a stranger, instead of asking him to call for help. Reports LUE pain is managed with tylenol. Denies other injuries. Denies headache. Reports she's able to get dressed and manage on her own. She frequently laughs and has difficulty with word finding.   Spoke with her daughter Haleigh Johnson, who has noticed cognitive decline over the past several months, including verbal skills. Her mother lacks insight re: her deficits and still wants to drive. She has also declined help from AL staff. Family would like repeat Neuropsych testing.       CODE STATUS/ADVANCE DIRECTIVES DISCUSSION:  Full Code  CPR/Full code   ALLERGIES: No Known Allergies   PAST MEDICAL HISTORY:   Past Medical History:   Diagnosis Date     Dementia (H)      Hyperlipidemia      Hypertension      Sleep apnea     Wears a mouth piece     Subdural hematoma (H)       PAST SURGICAL HISTORY:   has a past surgical history that includes Cholecystectomy and Davinci pelvic procedure (N/A, 3/31/2023).  FAMILY HISTORY: family history includes Endocarditis in her father.  SOCIAL HISTORY:   reports that " she has never smoked. She has never used smokeless tobacco. She reports that she does not currently use alcohol. She reports that she does not use drugs.  Patient's living condition: lives in an assisted living facility    Post Discharge Medication Reconciliation Status:   MED REC REQUIRED  Post Medication Reconciliation Status: discharge medications reconciled and changed, per note/orders       Current Outpatient Medications   Medication Sig Dispense Refill     acetaminophen (TYLENOL) 500 MG tablet Take 1,000 mg by mouth 2 times daily.       acetaminophen (TYLENOL) 500 MG tablet Take 1,000 mg by mouth every 6 hours as needed for mild pain.       amLODIPine (NORVASC) 2.5 MG tablet Take 1 tablet (2.5 mg) by mouth daily. 60 tablet 3     calcium carbonate-vitamin D (OSCAL) 500-5 MG-MCG tablet Take 1 tablet by mouth 2 times daily. 60 tablet 11     donepezil (ARICEPT) 5 MG tablet Take 5 mg by mouth daily (with breakfast).       latanoprost (XALATAN) 0.005 % ophthalmic solution Place 1 drop into both eyes At Bedtime       multivitamin w/minerals (THERA-VIT-M) tablet Take 1 tablet by mouth daily.       oxyCODONE (ROXICODONE) 5 MG tablet Take 5 mg by mouth every 6 hours as needed for severe pain.       polyethylene glycol (MIRALAX) 17 GM/Dose powder Take 17 g by mouth daily as needed for constipation.       senna-docusate (SENOKOT-S/PERICOLACE) 8.6-50 MG tablet Take 1 tablet by mouth 2 times daily as needed for constipation. 30 tablet 2     simvastatin (ZOCOR) 20 MG tablet Take 20 mg by mouth At Bedtime       Vitamin D3 (CHOLECALCIFEROL) 25 mcg (1000 units) tablet Take 1 tablet (25 mcg) by mouth daily. 30 tablet 11     No current facility-administered medications for this visit.       ROS:  4 point ROS including Respiratory, CV, GI and , other than that noted in the HPI,  is negative    Vitals:  /72   Pulse 62   Resp 16   Wt 72.1 kg (159 lb)   BMI 25.66 kg/m    Exam:  GENERAL APPEARANCE:  Alert, in no  distress  ENT:  [place]   EYES:  conjunctiva and lids normal  RESP:  lungs clear to auscultation   CV:  regular rate and rhythm, no murmur, trace ankle edema  ABDOMEN:  soft, non-tender, no distension   M/S:   gait steady. Left wrist in a splint, +CMS fingers. Sling LUE. Good strength of extremities. No joint inflammation   SKIN:  ecchymosis of left upper arm, skin intact  PSYCH:  oriented X 2-3, insight and memory impaired , affect and mood normal    Lab/Diagnostic data:  Recent labs in [facility] reviewed by me today.     ASSESSMENT / PLAN:  (S06.5XAA) Subdural hematoma (H)  (primary encounter diagnosis)  Comment: acute on chronic. Neuro status appears at baseline   Plan: Neurosurgery follow up as scheduled     (D12.502D) Closed fracture of distal end of left radius with routine healing, unspecified fracture morphology, subsequent encounter  (S42.002D) Closed nondisplaced fracture of left clavicle with routine healing, unspecified part of clavicle, subsequent encounter  Closed fracture of left proximal humerus   Comment: all fractures treated nonoperatively. Pain controlled   Plan: continue tylenol. Brace to left wrist, sling to LUE. Follow up [facility].     (M85.89) Osteopenia of multiple sites  Comment: fractures as above. Most recent DEXA was 9/19/2019 and showed osteopenia. Not on calcium-vit D.   Plan:  start calcium, vitamin D 1000 units daily. Check vitamin D level.     (F03.B0) Moderate dementia without behavioral disturbance, psychotic disturbance, mood disturbance, or anxiety, unspecified dementia type (H)  Comment: tends to mask her deficits by making jokes and laughing.   Plan: continue donepezil 5 mg daily. Discontinue tylenol pm due to potential side effects of benadryl. Cognitive testing per home therapies. AL staff assistance with cares, meals, activities, med admin. Daughter plans to follow up with [facility] for Neuropsych testing   Instructed patient that she should not leave the  building without supervision-daughter in agreement.     (I10) Primary hypertension  Comment: controlled with BPs: 128/72, 123/70  HR: 62-72  Plan: continue amlodipine 2.5 mg daily     (Z71.89) Advanced directives, counseling/discussion  Comment: daughter confirms Full Code      Electronically signed by:  KATHI Ferreira CNP 1945    Orders 2024:  Labs next routine lab day: vitamin D level (osteopenia)  Calcium-vit D 500-5 mg-mcg 1 po bid for osteopenia  Cholecalciferol 1000 units po daily for osteopenia   Sent to pharmacy  Discussed with her daughter    KATHI Ferreira CNP on 2024 at 4:26 PM        Sincerely,        KATHI Ferreira CNP

## 2024-11-25 NOTE — PROGRESS NOTES
Connie Greenwood   1945    Orders 2024:  Labs next routine lab day: vitamin D level (osteopenia)  Calcium-vit D 500-5 mg-mcg 1 po bid for osteopenia  Cholecalciferol 1000 units po daily for osteopenia   Sent to pharmacy  Discussed with her daughter    Hero Rutherford KATHI Good CNP on 2024 at 4:26 PM

## 2024-11-26 ENCOUNTER — TELEPHONE (OUTPATIENT)
Dept: NEUROSURGERY | Facility: CLINIC | Age: 79
End: 2024-11-26
Payer: COMMERCIAL

## 2024-11-26 LAB — VIT D+METAB SERPL-MCNC: 42 NG/ML (ref 20–50)

## 2024-11-26 NOTE — TELEPHONE ENCOUNTER
[facility] Call Center    Phone Message    May a detailed message be left on voicemail: yes     Reason for Call: Other: Patient was called by imaging to schedule CT scan, Samia from the imaging dept states that the patient had said she had 2 recent scans done and is wondering if this CT scan is needed. Please reach out to the patient to let her know if an additional CT scan is needed.      Action Taken: Message routed to:  Other: [facility]    Travel Screening: Not Applicable

## 2024-11-26 NOTE — CONFIDENTIAL NOTE
Admitted to [facility] for fall,SDH 10/6/24-10/10/24. Recs were Head CT in 4 weeks.    Head CT completed 11/7/24. Patient was called with results and was recc to rpt Head CT in 6 weeks so due around 12/20/24.     Patient had another fall and was admitted to [facility] 11/18-11/20. Head CT was done 11/18 and 11/19. Will call to request images. Reports in CE. Both Head CTs attached in PACS.     Patient was called by [facility] imaging scheduling and is questioning getting another Head CT if needed or not.     Enc routed to Reyna Del Castillo PA-C to review Head CTs from admission at [facility] and to clarify date for next Head CT (in 6 weeks as ordered or 2 weeks from last CT ~12/31/24).

## 2024-11-26 NOTE — CONFIDENTIAL NOTE
Returned call to [facility] imaging scheduling. Spoke to Nuzhat. Updated that Reyna Del Castillo PA-C would like the Head CT pushed out 6 weeks from 11/19 head CT so would be due on ~12/31/24. Nuzhat said they will give patient a call to get her scheduled.

## 2024-11-27 DIAGNOSIS — R52 PAIN: Primary | ICD-10-CM

## 2024-11-27 RX ORDER — OXYCODONE HYDROCHLORIDE 5 MG/1
5 TABLET ORAL EVERY 6 HOURS PRN
Qty: 20 TABLET | Refills: 0 | Status: SHIPPED | OUTPATIENT
Start: 2024-11-27

## 2024-12-10 ENCOUNTER — PATIENT OUTREACH (OUTPATIENT)
Dept: GERIATRIC MEDICINE | Facility: CLINIC | Age: 79
End: 2024-12-10
Payer: COMMERCIAL

## 2024-12-10 NOTE — PROGRESS NOTES
[facility] Care Coordination Contact    Member became effective with [facility] on 12/1/2024 with [employer].     Welcome letter sent to daughter Haleigh BATES.     Elizabeth Ferrer  Care Management Specialist   [facility]   [phone]

## 2024-12-10 NOTE — LETTER
December 10, 2024      DAHLIA CARRERA  C/O ZAK ZAMBRANO  [address] [place] [address]      Dear Dahlia:    William, my name is Emmie Brennan RN. You are eligible for [facility] Case Management program through your enrollment in [employer]. We think you may benefit from this program. I am writing to invite you to be in our Case Management program.     The following are a few things the Case Management program can help you with:  Select or change your primary care doctor or primary care clinic  Find a specialist, if needed, near your home  Receive preventive care, such as flu shots  Join programs offered by [facility] that interest you, like wellness programs    As your , I will do the following to enroll you in the Case Management Program:  Schedule a telephone call with you to answer any questions you may have about case management  Conduct an assessment by phone to identify needs case management can help you with  Develop a care plan to address those needs  Help you obtain available care and resources as needed    I will call you soon to discuss your interest in this program and your health care needs.    Being in the Case Management program is voluntary and offered to you at no cost. If you accept being in the Case Management program, you can stop any time by calling me at [phone].    Sincerely,      Emmie Brennan RN    E-mail: [email or web address]  Phone: [phone]      [facility]    [id number]                                     (01/2020)          [facility] [place] [address]  [phone]  fax [phone]  [facility]

## 2024-12-11 ENCOUNTER — PATIENT OUTREACH (OUTPATIENT)
Dept: GERIATRIC MEDICINE | Facility: CLINIC | Age: 79
End: 2024-12-11
Payer: COMMERCIAL

## 2024-12-11 NOTE — PROGRESS NOTES
[facility] Care Coordination Contact    [employer] Care Coordination    Member enrolled in [facility] with [employer] effective 12/1/24.     Reviewed Epic notes. Member had a hospitalization in November which prompted the move to assisted living. Family had noted cognitive decline as well. Established care with onsite NP. Member receives daily assistance, including med management, from assisted living. Has informal support from family.     Will continue to follow via EMR.     Emmie Brennan RN  [facility]  Cell: [phone]

## 2024-12-16 ENCOUNTER — OFFICE VISIT (OUTPATIENT)
Dept: URGENT CARE | Facility: URGENT CARE | Age: 79
End: 2024-12-16
Payer: COMMERCIAL

## 2024-12-16 VITALS
HEART RATE: 63 BPM | TEMPERATURE: 98.7 F | SYSTOLIC BLOOD PRESSURE: 132 MMHG | RESPIRATION RATE: 16 BRPM | BODY MASS INDEX: 26.63 KG/M2 | DIASTOLIC BLOOD PRESSURE: 79 MMHG | WEIGHT: 165 LBS | OXYGEN SATURATION: 99 %

## 2024-12-16 DIAGNOSIS — S81.812A LACERATION OF LEG, LEFT, INITIAL ENCOUNTER: Primary | ICD-10-CM

## 2024-12-16 PROCEDURE — 12001 RPR S/N/AX/GEN/TRNK 2.5CM/<: CPT | Performed by: FAMILY MEDICINE

## 2024-12-16 NOTE — PROGRESS NOTES
SUBJECTIVE: @[email or web address] who presents to the clinic with a laceration on the lower leg sustained hour(s) ago.    This is a accidental injury.    Mechanism of injury: blunt trauma (contusion).  Associated symptoms: Denies numbness, weakness, or loss of function  Last tetanus booster within 10 years: yes    Past Medical History:   Diagnosis Date    Dementia (H)     Hyperlipidemia     Hypertension     Sleep apnea     Wears a mouth piece    Subdural hematoma (H)      No Known Allergies  Social History     Tobacco Use    Smoking status: Never    Smokeless tobacco: Never   Substance Use Topics    Alcohol use: Not Currently     Comment: occ       ROS:  Neuro: good distal sensation  Motor: normal rom and strenght  Hem: capillary refill < 2 sec    EXAM: The patient appears today in alert,no apparent distress distressVITALS:   /79 (BP Location: Right arm, Patient Position: Sitting, Cuff Size: Adult Regular)   Pulse 63   Temp 98.7  F (37.1  C) (Oral)   Resp 16   Wt 74.8 kg (165 lb)   SpO2 99%   BMI 26.63 kg/m    Size of laceration: 2 centimeters  Characteristics of the laceration: clean and straight  Tendon function intact: yes  Sensation to light touch intact: yes  Pulses/Capillary refill intact: yes      ICD-10-CM    1. Laceration of leg, left, initial encounter  S81.812A REPAIR SUPERFICIAL, WOUND BODY < =2.5CM          Procedure Note:Wound injected with 1 cc's of Lidocaine 1% with epinephrine  Good anesthesia was obtainedPrepped and draped in the usual sterile fashion  Wound cleaned with sterile waterLaceration was closed using 5 4-0 nylon interrupted sutures  After care instructions:Keep wound clean   Sutures out in 10 days  Signs of infection discussed today

## 2024-12-23 ENCOUNTER — ASSISTED LIVING VISIT (OUTPATIENT)
Dept: GERIATRICS | Facility: CLINIC | Age: 79
End: 2024-12-23
Payer: COMMERCIAL

## 2024-12-23 VITALS
BODY MASS INDEX: 25.49 KG/M2 | WEIGHT: 157.9 LBS | SYSTOLIC BLOOD PRESSURE: 120 MMHG | TEMPERATURE: 97.6 F | HEART RATE: 82 BPM | DIASTOLIC BLOOD PRESSURE: 70 MMHG | RESPIRATION RATE: 16 BRPM

## 2024-12-23 DIAGNOSIS — S81.812D LACERATION OF LEFT LOWER LEG, SUBSEQUENT ENCOUNTER: Primary | ICD-10-CM

## 2024-12-23 DIAGNOSIS — S06.5XAA SUBDURAL HEMATOMA (H): ICD-10-CM

## 2024-12-23 DIAGNOSIS — M85.89 OSTEOPENIA OF MULTIPLE SITES: ICD-10-CM

## 2024-12-23 DIAGNOSIS — S42.002D CLOSED NONDISPLACED FRACTURE OF LEFT CLAVICLE WITH ROUTINE HEALING, UNSPECIFIED PART OF CLAVICLE, SUBSEQUENT ENCOUNTER: ICD-10-CM

## 2024-12-23 DIAGNOSIS — F03.B0 MODERATE DEMENTIA WITHOUT BEHAVIORAL DISTURBANCE, PSYCHOTIC DISTURBANCE, MOOD DISTURBANCE, OR ANXIETY, UNSPECIFIED DEMENTIA TYPE (H): ICD-10-CM

## 2024-12-23 DIAGNOSIS — S42.215D CLOSED NONDISPLACED FRACTURE OF SURGICAL NECK OF LEFT HUMERUS WITH ROUTINE HEALING, UNSPECIFIED FRACTURE MORPHOLOGY, SUBSEQUENT ENCOUNTER: ICD-10-CM

## 2024-12-23 DIAGNOSIS — R29.6 FREQUENT FALLS: ICD-10-CM

## 2024-12-23 DIAGNOSIS — S52.502D CLOSED FRACTURE OF DISTAL END OF LEFT RADIUS WITH ROUTINE HEALING, UNSPECIFIED FRACTURE MORPHOLOGY, SUBSEQUENT ENCOUNTER: ICD-10-CM

## 2024-12-23 DIAGNOSIS — I10 PRIMARY HYPERTENSION: ICD-10-CM

## 2024-12-23 PROCEDURE — 99349 HOME/RES VST EST MOD MDM 40: CPT | Performed by: NURSE PRACTITIONER

## 2024-12-23 NOTE — LETTER
12/23/2024      Connie Greenwood  C/o Haleigh Johnson  [address] [place] [address]        [facility]ATR[facility]    Chief Complaint   Patient presents with     RECHECK     The health plan new enrollment has happened. I have reviewed the  MDS, the preventative needs,  and facility care plan. The level of care is appropriate. I have reviewed the code status/advanced directives.       HPI:  Connie Greenwood is a 79 year old  (1945), who is being seen today for an episodic care visit at: [facility] [[id number]].   She admitted to this facility 11/1/2024 for a higher level of care due to progressive dementia and frequent falls.   Medical history significant for dementia, HTN, hyperlipidemia, KING, glaucoma.   Followed by [facility]. MMSE 25/30 on initial eval 8/2023. MRI brain showed mild chronic microvascular changes. Neuropsych testing 10/2023 showed mild cognitive impairment with significant deficits in learning and delayed memory, mild to mod impairment in semantic fluency. Findings were concerning for early neurodegenerative process. Donepezil was started 10/2023.   She was hospitalized at [facility] 9/15-9/17/2024 after falling outside with possible syncope. She had left sided rib fractures, left distal radius fracture and a scalp laceration that required sutures. Wrist fracture treated nonoperatively with a splint. CT head 9/15/2024 no acute intracranial process. Discharged home with therapies.   Hospitalized again at [facility] 10/6-10/10/2024 with an unwitnessed fall. Son found her on the floor in the morning, after seeing her the night before. CT head showed acute subdural hematoma.Neurosurgery consulted and recommended conservative management. Spironolactone was dc'd and amlodipine started. She had a left clavicle fracture, managed with a sling.  Hospital course was complicated by delirium and visual hallucinations. She discharged home with family, then moved  "to this facility.   She was hospitalized at [facility] 11/18-11/20/2024 after she fell while walking outside alone. A passerby picked her up and drove her to the ED. CT head showed acute on chronic subdural hematoma. XR showed a new left humeral fracture and new left radial fracture. Repeat CT head showed stability. Neurosurgery consulted, no intervention indicated. Ortho consulted, fractures managed with a sling and NWB of LUE.     Today's concern is:      Laceration of left lower leg, subsequent encounter  Closed fracture of distal end of left radius with routine healing, unspecified fracture morphology, subsequent encounter  Closed nondisplaced fracture of left clavicle with routine healing, unspecified part of clavicle, subsequent encounter  Closed nondisplaced fracture of surgical neck of left humerus with routine healing, unspecified fracture morphology, subsequent encounter  Osteopenia of multiple sites  Subdural hematoma (H)  Moderate dementia without behavioral disturbance, psychotic disturbance, mood disturbance, or anxiety, unspecified dementia type (H)  Primary hypertension  Frequent falls    She is seen for follow up of multiple medical issues. She sustained a laceration of her left lower leg 12/16/2024 when a picture fell and landed on her leg. Family took her to Urgent Care, where she received 5 sutures.   History is limited by her impaired verbal skills. She has word finding difficulty and frequently laughs when she can't find the words. Initially said that she caught her leg on furniture, then said \"something\" fell and hit her leg. Denies pain of her leg. Feeling well. Reports her left arm is \"tender but ok.\" Feels that she is adjusting to the building. Mood is good. No recent falls. Working with home therapies. PT recommends a cane when out of her apartment. Independent with cares. Meds are administered by staff.     Allergies, and PMH/PSH reviewed in [facility] today      REVIEW OF SYSTEMS:  4 point ROS " including Respiratory, CV, GI and , other than that noted in the HPI,  is negative    Objective:   /70   Pulse 82   Temp 97.6  F (36.4  C)   Resp 16   Wt 71.6 kg (157 lb 14.4 oz)   BMI 25.49 kg/m    GENERAL APPEARANCE:  Alert, in no distress  ENT:  [place]   EYES:  conjunctiva and lids normal  RESP:  lungs clear to auscultation   CV:  regular rate and rhythm, no murmur, trace ankle edema  ABDOMEN:  soft, non-tender, no distension   M/S:   gait steady Decreased ROM left shoulder, no tenderness to palpation. No joint inflammation   SKIN:  approx 2.5 cm L shaped laceration left lower leg with 5 sutures intact, mild bruising and erythema, no drainage, no warmth.   PSYCH:  oriented X 2-3, insight and memory impaired , affect and mood normal      Recent labs in [facility] reviewed by me today.       ASSESSMENT / PLAN:  (V85.296G) Laceration of left lower leg, subsequent encounter  (primary encounter diagnosis)  Comment: healing without signs of infection. Minimal pain   Plan: remove sutures next week during rounds. Continue tylenol.   Message left for her son Av Greenwood, who messaged back with info re: the injury.     (S52.502D) Closed fracture of distal end of left radius with routine healing, unspecified fracture morphology, subsequent encounter  (S42.002D) Closed nondisplaced fracture of left clavicle with routine healing, unspecified part of clavicle, subsequent encounter  (S42.215D) Closed nondisplaced fracture of surgical neck of left humerus with routine healing, unspecified fracture morphology, subsequent encounter  Comment: all fractures managed non operatively. Pain and ROM LUE improving.   Oxycodone last used 11/23/2024  Plan: continue tylenol. Continue home therapies. Follow up [facility] as scheduled.     (M62.) Osteopenia of multiple sites  Comment: multiple fractures. Vitamin D level 42 on 11/26/2024  Most recent DEXA was 9/2019 and showed osteopenia   Plan: continue calcium, vitamin  D    (S06.5XAA) Subdural hematoma (H)  Comment: acute on chronic. Cognition appears at baseline   Plan: follow up CT and Neurosurgery as scheduled     (F03.B0) Moderate dementia without behavioral disturbance, psychotic disturbance, mood disturbance, or anxiety, unspecified dementia type (H)  Comment: appears to be adjusting well to the facility. Mood is good.   Plan: continue donepezil. AL staff assistance with cares, meals, activities, med admin. Neurology follow up with repeat Neuropsych testing as scheduled.     (I10) Primary hypertension  Comment: controlled with BPs: 120/70, 128/72, 123/70  HR: 66-72  Plan: continue amlodipine 2.5 mg daily,     Frequent falls  Comment: progressing in therapies. She can be impulsive with poor safety awareness.   Plan: continue PHYSICAL THERAPY/OT with [facility]          Electronically signed by: KATHI Ferreira CNP           Sincerely,        KATHI Ferreira CNP

## 2024-12-23 NOTE — PROGRESS NOTES
[facility] GERIATRICS    Chief Complaint   Patient presents with    RECHECK     The health plan new enrollment has happened. I have reviewed the  MDS, the preventative needs,  and facility care plan. The level of care is appropriate. I have reviewed the code status/advanced directives.       HPI:  Connie Greenwood is a 79 year old  (1945), who is being seen today for an [place]odic [place] visit [place] [facility] [[id number]].   She admitted to this facility 11/1/2024 for a higher level of care due to progressive dementia and frequent falls.   Medical history significant for dementia, HTN, hyperlipidemia, KING, glaucoma.   Followed by [facility]. MMSE 25/30 on initial eval 8/2023. MRI brain showed mild chronic microvascular changes. Neuropsych testing 10/2023 showed mild cognitive impairment with significant deficits in learning and delayed memory, mild to mod impairment in semantic fluency. Findings were concerning for early neurodegenerative process. Donepezil was started 10/2023.   She was hospitalized at [facility] 9/15-9/17/2024 after falling outside with possible syncope. She had left sided rib fractures, left distal radius fracture and a scalp laceration that required sutures. Wrist fracture treated nonoperatively with a splint. CT head 9/15/2024 no acute intracranial process. Discharged home with therapies.   Hospitalized again at [facility] 10/6-10/10/2024 with an unwitnessed fall. Son found her on the floor in the morning, after seeing her the night before. CT head showed acute subdural hematoma.Neurosurgery consulted and recommended conservative management. Spironolactone was dc'd and amlodipine started. She had a left clavicle fracture, managed with a sling.  Hospital course was complicated by delirium and visual hallucinations. She discharged home with family, then moved to this facility.   She was hospitalized at [facility] 11/18-11/20/2024 after she fell while walking outside  "alone. A passerby picked her up and drove her to the ED. CT head showed acute on chronic subdural hematoma. XR showed a new left humeral fracture and new left radial fracture. Repeat CT head showed stability. Neurosurgery consulted, no intervention indicated. Ortho consulted, fractures managed with a sling and NWB of MONAE.     Today's concern is:      Laceration of left lower leg, subsequent encounter  Closed fracture of distal end of left radius with routine healing, unspecified fracture morphology, subsequent encounter  Closed nondisplaced fracture of left clavicle with routine healing, unspecified part of clavicle, subsequent encounter  Closed nondisplaced fracture of surgical neck of left humerus with routine healing, unspecified fracture morphology, subsequent encounter  Osteopenia of multiple sites  Subdural hematoma (H)  Moderate dementia without behavioral disturbance, psychotic disturbance, mood disturbance, or anxiety, unspecified dementia type (H)  Primary hypertension  Frequent falls    She is seen for follow up of multiple medical issues. She sustained a laceration of her left lower leg 12/16/2024 when a picture fell and landed on her leg. Family took her to Urgent Care, where she received 5 sutures.   History is limited by her impaired verbal skills. She has word finding difficulty and frequently laughs when she can't find the words. Initially said that she caught her leg on furniture, then said \"something\" fell and hit her leg. Denies pain of her leg. Feeling well. Reports her left arm is \"tender but ok.\" Feels that she is adjusting to the building. Mood is good. No recent falls. Working with home therapies. PT recommends a cane when out of her apartment. Independent with cares. Meds are administered by staff.     Allergies, and PMH/PSH reviewed in [facility] today      REVIEW OF SYSTEMS:  4 point ROS including Respiratory, CV, GI and , other than that noted in the HPI,  is negative    Objective:   BP " 120/70   Pulse 82   Temp 97.6  F (36.4  C)   Resp 16   Wt 71.6 kg (157 lb 14.4 oz)   BMI 25.49 kg/m    GENERAL APPEARANCE:  Alert, in no distress  ENT:  [place]   EYES:  conjunctiva and lids normal  RESP:  lungs clear to auscultation   CV:  regular rate and rhythm, no murmur, trace ankle edema  ABDOMEN:  soft, non-tender, no distension   M/S:   gait steady Decreased ROM left shoulder, no tenderness to palpation. No joint inflammation   SKIN:  approx 2.5 cm L shaped laceration left lower leg with 5 sutures intact, mild bruising and erythema, no drainage, no warmth.   PSYCH:  oriented X 2-3, insight and memory impaired , affect and mood normal      Recent labs in [facility] reviewed by me today.       ASSESSMENT / PLAN:  (S81.692D) Laceration of left lower leg, subsequent encounter  (primary encounter diagnosis)  Comment: healing without signs of infection. Minimal pain   Plan: remove sutures next week during rounds. Continue tylenol.   Message left for her son Av Greenwood, who messaged back with info re: the injury.     (S52.502D) Closed fracture of distal end of left radius with routine healing, unspecified fracture morphology, subsequent encounter  (S42.002D) Closed nondisplaced fracture of left clavicle with routine healing, unspecified part of clavicle, subsequent encounter  (S42.215D) Closed nondisplaced fracture of surgical neck of left humerus with routine healing, unspecified fracture morphology, subsequent encounter  Comment: all fractures managed non operatively. Pain and ROM LUE improving.   Oxycodone last used 11/23/2024  Plan: continue tylenol. Continue home therapies. Follow up [facility] as scheduled.     (M85.89) Osteopenia of multiple sites  Comment: multiple fractures. Vitamin D level 42 on 11/26/2024  Most recent DEXA was 9/2019 and showed osteopenia   Plan: continue calcium, vitamin D    (S06.5XAA) Subdural hematoma (H)  Comment: acute on chronic. Cognition appears at baseline   Plan: follow up  CT and Neurosurgery as scheduled     (F03.B0) Moderate dementia without behavioral disturbance, psychotic disturbance, mood disturbance, or anxiety, unspecified dementia type (H)  Comment: appears to be adjusting well to the facility. Mood is good.   Plan: continue donepezil. AL staff assistance with cares, meals, activities, med admin. Neurology follow up with repeat Neuropsych testing as scheduled.     (I10) Primary hypertension  Comment: controlled with BPs: 120/70, 128/72, 123/70  HR: 66-72  Plan: continue amlodipine 2.5 mg daily,     Frequent falls  Comment: progressing in therapies. She can be impulsive with poor safety awareness.   Plan: continue PHYSICAL THERAPY/OT with [facility]          Electronically signed by: KATHI Ferreira CNP

## 2024-12-27 ENCOUNTER — TRANSFERRED RECORDS (OUTPATIENT)
Dept: HEALTH INFORMATION MANAGEMENT | Facility: CLINIC | Age: 79
End: 2024-12-27
Payer: COMMERCIAL

## 2024-12-30 ENCOUNTER — ASSISTED LIVING VISIT (OUTPATIENT)
Dept: GERIATRICS | Facility: CLINIC | Age: 79
End: 2024-12-30
Payer: COMMERCIAL

## 2024-12-30 VITALS
RESPIRATION RATE: 18 BRPM | SYSTOLIC BLOOD PRESSURE: 135 MMHG | DIASTOLIC BLOOD PRESSURE: 56 MMHG | WEIGHT: 157.9 LBS | TEMPERATURE: 97.5 F | HEART RATE: 74 BPM | BODY MASS INDEX: 25.49 KG/M2

## 2024-12-30 DIAGNOSIS — S52.502D CLOSED FRACTURE OF DISTAL END OF LEFT RADIUS WITH ROUTINE HEALING, UNSPECIFIED FRACTURE MORPHOLOGY, SUBSEQUENT ENCOUNTER: ICD-10-CM

## 2024-12-30 DIAGNOSIS — F03.B0 MODERATE DEMENTIA WITHOUT BEHAVIORAL DISTURBANCE, PSYCHOTIC DISTURBANCE, MOOD DISTURBANCE, OR ANXIETY, UNSPECIFIED DEMENTIA TYPE (H): ICD-10-CM

## 2024-12-30 DIAGNOSIS — R29.6 FREQUENT FALLS: ICD-10-CM

## 2024-12-30 DIAGNOSIS — I10 PRIMARY HYPERTENSION: ICD-10-CM

## 2024-12-30 DIAGNOSIS — S42.215D CLOSED NONDISPLACED FRACTURE OF SURGICAL NECK OF LEFT HUMERUS WITH ROUTINE HEALING, UNSPECIFIED FRACTURE MORPHOLOGY, SUBSEQUENT ENCOUNTER: ICD-10-CM

## 2024-12-30 DIAGNOSIS — S81.812D LACERATION OF LEFT LOWER LEG, SUBSEQUENT ENCOUNTER: Primary | ICD-10-CM

## 2024-12-30 DIAGNOSIS — S42.002D CLOSED NONDISPLACED FRACTURE OF LEFT CLAVICLE WITH ROUTINE HEALING, UNSPECIFIED PART OF CLAVICLE, SUBSEQUENT ENCOUNTER: ICD-10-CM

## 2024-12-30 DIAGNOSIS — S06.5XAA SUBDURAL HEMATOMA (H): ICD-10-CM

## 2024-12-30 PROCEDURE — 99349 HOME/RES VST EST MOD MDM 40: CPT | Performed by: NURSE PRACTITIONER

## 2024-12-30 NOTE — LETTER
12/30/2024      Connie Greenwood  C/o Haleigh Johnson  [address] [place] [address]        [facility] GERIATRICS    Chief Complaint   Patient presents with     RECHECK       HPI:  Connie Greenwood is a 79 year old  (1945), who is being seen today for an episodic care visit at: [facility]) [[id number]].   She admitted to this facility 11/1/2024 for a higher level of care due to progressive dementia and frequent falls.   Medical history significant for dementia, HTN, hyperlipidemia, KING, glaucoma.   Followed by [facility]. MMSE 25/30 on initial eval 8/2023. MRI brain showed mild chronic microvascular changes. Neuropsych testing 10/2023 showed mild cognitive impairment with significant deficits in learning and delayed memory, mild to mod impairment in semantic fluency. Findings were concerning for early neurodegenerative process. Donepezil was started 10/2023.   She was hospitalized at [facility] 9/15-9/17/2024 after falling outside with possible syncope. She had left sided rib fractures, left distal radius fracture and a scalp laceration that required sutures. Wrist fracture treated nonoperatively with a splint. CT head 9/15/2024 no acute intracranial process. Discharged home with therapies.   Hospitalized again at [facility] 10/6-10/10/2024 with an unwitnessed fall. Son found her on the floor in the morning, after seeing her the night before. CT head showed acute subdural hematoma.Neurosurgery consulted and recommended conservative management. Spironolactone was dc'd and amlodipine started. She had a left clavicle fracture, managed with a sling.  Hospital course was complicated by delirium and visual hallucinations. She discharged home with family, then moved to this facility.   She was hospitalized at [facility] 11/18-11/20/2024 after she fell while walking outside alone. A passerby picked her up and drove her to the ED. CT head showed acute on chronic subdural hematoma.  "XR showed a new left humeral fracture and new left radial fracture. Repeat CT head showed stability. Neurosurgery consulted, no intervention indicated. Ortho consulted, fractures managed with a sling and NWB of MONAE.   She was seen in Urgent Care 12/16/2024 for a laceration of her left lower leg after a picture fell and landed on her leg. Laceration was repaired with 5 sutures.   Donepezil was increased 12/27/2024 per Neurology.     Today's concern is:      Laceration of left lower leg, subsequent encounter  Moderate dementia without behavioral disturbance, psychotic disturbance, mood disturbance, or anxiety, unspecified dementia type (H)  Subdural hematoma (H)  Closed fracture of distal end of left radius with routine healing, unspecified fracture morphology, subsequent encounter  Closed nondisplaced fracture of left clavicle with routine healing, unspecified part of clavicle, subsequent encounter  Closed nondisplaced fracture of surgical neck of left humerus with routine healing, unspecified fracture morphology, subsequent encounter  Primary hypertension  Frequent falls  She is seen today to remove sutures of the left leg laceration. Pleasant and talkative, speech is more fluent today. Feeling well, but has been \"worried\" about having the sutures removed. Denies pain of the area. Reports her LUE is feeling better with minimal pain and improved ROM. Working with home therapies. Up ad shayy and independent with cares. No recent falls. She saw Neurology 12/27/2024 and reports the appointment didn't go well. \"It made me feel like I've done something wrong.\" She denies feeling depressed or anxious and feels that she has adjusted well to the facility. A grandson has started working in the kitchen and visits after his shift, which she really enjoys.   Spoke with her daughter Haleigh Johnson, who agrees the neurology appointment didn't go well. Patient lacks insight re: her deficits, which makes it difficult to discuss " management of her dementia. Family has been concerned about anxiety and possible depression.       Allergies, and PMH/PSH reviewed in [facility] today      REVIEW OF SYSTEMS:  4 point ROS including Respiratory, CV, GI and , other than that noted in the HPI,  is negative    Objective:   /56   Pulse 74   Temp 97.5  F (36.4  C)   Resp 18   Wt 71.6 kg (157 lb 14.4 oz)   BMI 25.49 kg/m    GENERAL APPEARANCE:  Alert, in no distress  ENT:  [place]   EYES:  conjunctiva and lids normal  RESP:  lungs clear to auscultation   CV:  regular rate and rhythm, no murmur, no edema   ABDOMEN:  soft, non-tender, no distension   M/S:   gait steady Decreased ROM left shoulder, no tenderness to palpation. No joint inflammation   SKIN: approx 2.5 cm L shaped laceration left shin clean,dry, intact, mild bruising, no erythema or warmth   PSYCH:  oriented X 2-3, insight and memory impaired , affect and mood normal      Recent labs in [facility] reviewed by me today.       ASSESSMENT / PLAN:  (S81.322D) Laceration of left lower leg, subsequent encounter  (primary encounter diagnosis)  Comment: sutures removed without difficulty   Plan: keep clean and dry, monitor for healing     (F03.B0) Moderate dementia without behavioral disturbance, psychotic disturbance, mood disturbance, or anxiety, unspecified dementia type (H)  Comment: mood appears good and she's adjusting well to the facility.   Discussed possibility of starting citalopram with her daughter-if/when indicated.   Plan: continue donepezil 10 mg daily. AL staff assistance with cares, meals, activities, med admin. Neuropsych testing 4/2025 and Neurology follow up as scheduled.     (S06.5XAA) Subdural hematoma (H)  Comment: neuro status appears at baseline   Plan: follow up CT and Neurosurgery as scheduled     (C42.882D) Closed fracture of distal end of left radius with routine healing, unspecified fracture morphology, subsequent encounter  (S42.002D) Closed nondisplaced fracture of left  clavicle with routine healing, unspecified part of clavicle, subsequent encounter  (N78.286K) Closed nondisplaced fracture of surgical neck of left humerus with routine healing, unspecified fracture morphology, subsequent encounter  Comment: all fractures managed non operatively. ROM improving, minimal pain   Plan: continue tylenol. Home therapies. Follow up with [facility] as scheduled     (I10) Primary hypertension  Comment: controlled with BPs: 135/56, 120/70    Plan: continue amlodipine 2.5 mg daily     (R29.6) Frequent falls  Comment: making progress in therapies. No recent falls   Plan: continue PHYSICAL THERAPY/OT with [facility]             Electronically signed by: KATHI Ferreira CNP             Sincerely,        KATHI Ferreira CNP    Electronically signed

## 2024-12-30 NOTE — PROGRESS NOTES
[facility] GERIATRICS    Chief Complaint   Patient presents with    RECHECK       HPI:  Connie Greenwood is a 79 year old  (1945), who is being seen today for an episodic care visit at: [facility]) [[id number]].   She admitted to this facility 11/1/2024 for a higher level of care due to progressive dementia and frequent falls.   Medical history significant for dementia, HTN, hyperlipidemia, KING, glaucoma.   Followed by [facility]. MMSE 25/30 on initial eval 8/2023. MRI brain showed mild chronic microvascular changes. Neuropsych testing 10/2023 showed mild cognitive impairment with significant deficits in learning and delayed memory, mild to mod impairment in semantic fluency. Findings were concerning for early neurodegenerative process. Donepezil was started 10/2023.   She was hospitalized at [facility] 9/15-9/17/2024 after falling outside with possible syncope. She had left sided rib fractures, left distal radius fracture and a scalp laceration that required sutures. Wrist fracture treated nonoperatively with a splint. CT head 9/15/2024 no acute intracranial process. Discharged home with therapies.   Hospitalized again at [facility] 10/6-10/10/2024 with an unwitnessed fall. Son found her on the floor in the morning, after seeing her the night before. CT head showed acute subdural hematoma.Neurosurgery consulted and recommended conservative management. Spironolactone was dc'd and amlodipine started. She had a left clavicle fracture, managed with a sling.  Hospital course was complicated by delirium and visual hallucinations. She discharged home with family, then moved to this facility.   She was hospitalized at [facility] 11/18-11/20/2024 after she fell while walking outside alone. A passerby picked her up and drove her to the ED. CT head showed acute on chronic subdural hematoma. XR showed a new left humeral fracture and new left radial fracture. Repeat CT head showed stability.  "Neurosurgery consulted, no intervention indicated. Ortho consulted, fractures managed with a sling and NWB of LUE.   She was seen in Urgent Care 12/16/2024 for a laceration of her left lower leg after a picture fell and landed on her leg. Laceration was repaired with 5 sutures.   Donepezil was increased 12/27/2024 per Neurology.     Today's concern is:      Laceration of left lower leg, subsequent encounter  Moderate dementia without behavioral disturbance, psychotic disturbance, mood disturbance, or anxiety, unspecified dementia type (H)  Subdural hematoma (H)  Closed fracture of distal end of left radius with routine healing, unspecified fracture morphology, subsequent encounter  Closed nondisplaced fracture of left clavicle with routine healing, unspecified part of clavicle, subsequent encounter  Closed nondisplaced fracture of surgical neck of left humerus with routine healing, unspecified fracture morphology, subsequent encounter  Primary hypertension  Frequent falls  She is seen today to remove sutures of the left leg laceration. Pleasant and talkative, speech is more fluent today. Feeling well, but has been \"worried\" about having the sutures removed. Denies pain of the area. Reports her LUE is feeling better with minimal pain and improved ROM. Working with home therapies. Up ad shayy and independent with cares. No recent falls. She saw Neurology 12/27/2024 and reports the appointment didn't go well. \"It made me feel like I've done something wrong.\" She denies feeling depressed or anxious and feels that she has adjusted well to the facility. A grandson has started working in the kitchen and visits after his shift, which she really enjoys.   Spoke with her daughter Haleigh Johnson, who agrees the neurology appointment didn't go well. Patient lacks insight re: her deficits, which makes it difficult to discuss management of her dementia. Family has been concerned about anxiety and possible depression. "       Allergies, and PMH/PSH reviewed in [facility] today      REVIEW OF SYSTEMS:  4 point ROS including Respiratory, CV, GI and , other than that noted in the HPI,  is negative    Objective:   /56   Pulse 74   Temp 97.5  F (36.4  C)   Resp 18   Wt 71.6 kg (157 lb 14.4 oz)   BMI 25.49 kg/m    GENERAL APPEARANCE:  Alert, in no distress  ENT:  [place]   EYES:  conjunctiva and lids normal  RESP:  lungs clear to auscultation   CV:  regular rate and rhythm, no murmur, no edema   ABDOMEN:  soft, non-tender, no distension   M/S:   gait steady Decreased ROM left shoulder, no tenderness to palpation. No joint inflammation   SKIN: approx 2.5 cm L shaped laceration left shin clean,dry, intact, mild bruising, no erythema or warmth   PSYCH:  oriented X 2-3, insight and memory impaired , affect and mood normal      Recent labs in [facility] reviewed by me today.       ASSESSMENT / PLAN:  (S81.302D) Laceration of left lower leg, subsequent encounter  (primary encounter diagnosis)  Comment: sutures removed without difficulty   Plan: keep clean and dry, monitor for healing     (F03.B0) Moderate dementia without behavioral disturbance, psychotic disturbance, mood disturbance, or anxiety, unspecified dementia type (H)  Comment: mood appears good and she's adjusting well to the facility.   Discussed possibility of starting citalopram with her daughter-if/when indicated.   Plan: continue donepezil 10 mg daily. AL staff assistance with cares, meals, activities, med admin. Neuropsych testing 4/2025 and Neurology follow up as scheduled.     (S06.5XAA) Subdural hematoma (H)  Comment: neuro status appears at baseline   Plan: follow up CT and Neurosurgery as scheduled     (S52.502D) Closed fracture of distal end of left radius with routine healing, unspecified fracture morphology, subsequent encounter  (S42.002D) Closed nondisplaced fracture of left clavicle with routine healing, unspecified part of clavicle, subsequent encounter  (S42.215D)  Closed nondisplaced fracture of surgical neck of left humerus with routine healing, unspecified fracture morphology, subsequent encounter  Comment: all fractures managed non operatively. ROM improving, minimal pain   Plan: continue tylenol. Home therapies. Follow up with [facility] as scheduled     (I10) Primary hypertension  Comment: controlled with BPs: 135/56, 120/70    Plan: continue amlodipine 2.5 mg daily     (R29.6) Frequent falls  Comment: making progress in therapies. No recent falls   Plan: continue PHYSICAL THERAPY/OT with [facility]             Electronically signed by: KATHI Ferreira CNP

## 2025-01-27 ENCOUNTER — ASSISTED LIVING VISIT (OUTPATIENT)
Dept: GERIATRICS | Facility: CLINIC | Age: 80
End: 2025-01-27
Payer: COMMERCIAL

## 2025-01-27 VITALS
WEIGHT: 160.5 LBS | BODY MASS INDEX: 25.79 KG/M2 | OXYGEN SATURATION: 98 % | SYSTOLIC BLOOD PRESSURE: 100 MMHG | HEART RATE: 54 BPM | HEIGHT: 66 IN | TEMPERATURE: 95.1 F | RESPIRATION RATE: 12 BRPM | DIASTOLIC BLOOD PRESSURE: 69 MMHG

## 2025-01-27 DIAGNOSIS — R29.6 FREQUENT FALLS: ICD-10-CM

## 2025-01-27 DIAGNOSIS — R41.9 NEUROCOGNITIVE DISORDER: Primary | ICD-10-CM

## 2025-01-27 DIAGNOSIS — F03.B0 MODERATE DEMENTIA WITHOUT BEHAVIORAL DISTURBANCE, PSYCHOTIC DISTURBANCE, MOOD DISTURBANCE, OR ANXIETY, UNSPECIFIED DEMENTIA TYPE (H): ICD-10-CM

## 2025-01-27 DIAGNOSIS — G47.33 OSA (OBSTRUCTIVE SLEEP APNEA): ICD-10-CM

## 2025-01-27 DIAGNOSIS — S42.215D CLOSED NONDISPLACED FRACTURE OF SURGICAL NECK OF LEFT HUMERUS WITH ROUTINE HEALING, UNSPECIFIED FRACTURE MORPHOLOGY, SUBSEQUENT ENCOUNTER: ICD-10-CM

## 2025-01-27 DIAGNOSIS — S52.502D CLOSED FRACTURE OF DISTAL END OF LEFT RADIUS WITH ROUTINE HEALING, UNSPECIFIED FRACTURE MORPHOLOGY, SUBSEQUENT ENCOUNTER: ICD-10-CM

## 2025-01-27 DIAGNOSIS — I10 PRIMARY HYPERTENSION: ICD-10-CM

## 2025-01-27 DIAGNOSIS — E78.5 HYPERLIPIDEMIA, UNSPECIFIED HYPERLIPIDEMIA TYPE: ICD-10-CM

## 2025-01-27 DIAGNOSIS — S06.5XAA SUBDURAL HEMATOMA (H): ICD-10-CM

## 2025-01-27 DIAGNOSIS — S42.002D CLOSED NONDISPLACED FRACTURE OF LEFT CLAVICLE WITH ROUTINE HEALING, UNSPECIFIED PART OF CLAVICLE, SUBSEQUENT ENCOUNTER: ICD-10-CM

## 2025-01-27 PROCEDURE — 99349 HOME/RES VST EST MOD MDM 40: CPT | Performed by: INTERNAL MEDICINE

## 2025-01-27 NOTE — LETTER
1/27/2025      Connie Greenwood  C/o Haleigh Johnson  [address] [place]  [facility] [address]        Connie Greenwood is a 79 year old female seen January 27, 2025 at [facility] where she has resided for 3 months (admit 10/2024) seen to follow up memory loss and history of falls   Pt is seen in her apartment, up ambulating without device.   Reports she is feeling well, denies any pain or other symptoms.  Her arm is better, demonstrates good range of motion.   No CV or GI symptoms         By chart review, pt has had a progressive cognitive decline over the past couple of years.   She saw Neurology and underwent Neuropsychiatric testing in the fall of 2023    Testing showed significant deficits in verbal learning and memory, impaired delayed recall and moderate deficits in processing.   She was started on donepezil at that time, although not compliant with taking it when living on her own.      Pt had a [facility] hospitalization in September 2024 following an episode of LOC with a fall in which she suffered a scalp laceration, left side 7th and 8th rib fractures and comminuted and angulated distal left radius and ulna fractures    She discharged home, than had another [facility] hospitalization in October 2024 after being found down by her son.   Imaging showed a SDH with increased confusion and hallucinations during her stay.   Pt had another fall in November 2024 and was hospitalized at [facility] with an acute left surgical neck of the proximal humerus fracture and new left radial fracture.   She was managed non operatively and returned to [place]    Pt had been out walking alone and was found down outside by a passerby, got in the car with him (a stranger) to go to ED.      Past Medical History:   Diagnosis Date     Dementia (H)      Hyperlipidemia      Hypertension      Sleep apnea     Wears a mouth piece     Subdural hematoma (H)        Past Surgical History:   Procedure Laterality Date     CHOLECYSTECTOMY        "DAVINCI PELVIC PROCEDURE N/A 3/31/2023    Procedure: Xi Robotic Assisted Laparoscopic Sacral Colpopexy, Robotic Laparoscopic Supracervical Hysterectomy with Bilateral Salpingectomy-Oopherectomy, Lysis of Adhesions,  Robotic Laparoscopic Abdominal Enterocele Repair, Cystoscopy, and Midurethral Sling;  Surgeon: Sheryl Patiño MD;  Location:  OR     :  [facility]  Son Av   Daughter Connie  Four grandchildren  Sister and brother-in-law are visiting today.   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   Non smoker     ROS:  MMSE 25/30 in Aug 2023   MoCA 20/30 in Dec 2024   CPT 5.0      Wt Readings from Last 5 Encounters:   01/27/25 72.8 kg (160 lb 8 oz)   12/30/24 71.6 kg (157 lb 14.4 oz)   12/23/24 71.6 kg (157 lb 14.4 oz)   12/16/24 74.8 kg (165 lb)   11/25/24 72.1 kg (159 lb)      GENERAL APPEARANCE: alert and no distress  /69   Pulse 54   Temp (!) 95.1  F (35.1  C)   Resp 12   Ht 1.674 m (5' 5.9\")   Wt 72.8 kg (160 lb 8 oz)   SpO2 98%   BMI 25.98 kg/m     HEENT: normocephalic, no lesion or abnormalities  Mild kyphosis  NECK: no adenopathy, no asymmetry, masses, or scars and thyroid normal to palpation  RESP: lungs clear to auscultation - no rales, rhonchi or wheezes  CV: regular rate and rhythm, normal S1 S2  ABDOMEN:  soft, nontender, no HSM or masses and bowel sounds normal  MS: extremities with trace edema  SKIN:  Chronic skin changes of LEs, left shin with a healing scab.     NEURO: Normal strength and tone, sensory exam grossly normal, and speech normal  PSYCH: affect okay, pleasant and smiling  LYMPHATICS: No cervical,  or supraclavicular nodes     Lab Results   Component Value Date     10/07/2024    POTASSIUM 4.1 10/07/2024    CHLORIDE 108 (H) 10/07/2024    CO2 23 10/07/2024    ANIONGAP 9 10/07/2024    GLC 89 10/07/2024    BUN 14.8 10/07/2024    CR 0.64 10/07/2024    GFRESTIMATED 90 10/07/2024    ISAURO 8.5 " (L) 10/07/2024     Lab Results   Component Value Date    AST 49 10/07/2024      ALBUMIN 3.3 10/07/2024      ALKPHOS 94 10/07/2024      Lab Results   Component Value Date    WBC 6.5 10/07/2024      HGB 11.9 10/07/2024      MCV 94 10/07/2024       10/07/2024      CT head 11/19/2024.  No interval change in the mixed density left convexity subdural hematoma measuring up to 13 mm in thickness. Mild effacement of the underlying frontal lobe sulci. No significant midline shift.  No new intracranial hemorrhage. Similar mild diffuse parenchymal volume loss. The ventricles are unchanged in caliber. The gray-white matter interface is preserved. There is similar scattered hypoattenuation within the supratentorial white matter typical of chronic small vessel ischemic changes.  No calvarial fracture. Evidence of prior cataract surgery. The paranasal sinuses and mastoid air cells are clear.  IMPRESSION:  1. No interval change since 11/18/2024.  2. Mixed density left convexity subdural hematoma with mild effacement of the underlying frontal lobe sulci and no significant midline shift.  3. Similar mild diffuse parenchymal volume loss and presumed chronic small vessel ischemic changes.     Two views of the left clavicle 11/19/2025    Three piece fracture of the lateral left clavicle with mild inferior angulation of the lateral fragments with respect the larger medial fragment some callus formation noted. AC joint intact.     Left humerus x-ray 11/18/2025   Surgical neck fracture of the left proximal humerus without significant displacement, likely minimal impaction.   Displaced fracture of the distal left clavicle with the distal most fracture fragment displaced inferiorly by approximately 1 bone width. Additionally, there is acromioclavicular joint separation with the clavicle displaced superiorly by approximately 1.5 to 2.5 centimeters.      ECHO 9/2024   1. Normal biventricular size and function. Left ventricular ejection  "fraction of 60-65%.  2. No segmental wall motion abnormalities noted.  3. There is mild (1+) tricuspid regurgitation.  No prior study for comparison. Technically adequate study.      IMP/PLAN:   (R41.9) Neurocognitive disorder   (F03.B0) Moderate dementia without behavioral disturbance, psychotic disturbance, mood disturbance, or anxiety, unspecified dementia type (H)  Comment: progressive course consistent with an Alzheimer's dementia   Pt lacks awareness of her functional decline, today reports she's \"going to get her car back\"     Plan: AL support for med admin, meals, activity        (S06.5XAA) Subdural hematoma (H)  Comment: as above   Plan: following with Neurosurgery     (S52.502D) Closed fracture of distal end of left radius with routine healing, unspecified fracture morphology, subsequent encounter  (S42.002D) Closed nondisplaced fracture of left clavicle with routine healing, unspecified part of clavicle, subsequent encounter  (S42.215D) Closed nondisplaced fracture of surgical neck of left humerus with routine healing, unspecified fracture morphology, subsequent encounter  Comment: multiple falls    Plan: acetaminophen 1000 mg bid and PRN for pain     Will discontinue oxycodone as pt is not using that      Follow up with Orthopedics as scheduled     (R29.6) Frequent falls  Comment: poor safety awareness, deconditioning  Plan: vit D 25 mcg/day, calcium supplement for bone health      [facility] Occupational Therapy for balance, gait, strengthening     (G47.33) KING (obstructive sleep apnea)  Comment: not using her CPAP   Plan: follow up with Sleep Clinic PRN     (I10) Primary hypertension  Comment:   BP Readings from Last 3 Encounters:   01/27/25 100/69   12/30/24 135/56   12/23/24 120/70      Plan: amlodipine 2.5 mg/day   Follow bps    (E78.5) Hyperlipidemia, unspecified hyperlipidemia type  Comment: no h/o CV event  Plan: simvastatin 20 mg/day for primary prevention      Dulce Yu MD "       Sincerely,        Dulce Yu MD    Electronically signed

## 2025-02-05 DIAGNOSIS — Z53.9 DIAGNOSIS NOT YET DEFINED: Primary | ICD-10-CM

## 2025-02-05 PROCEDURE — G0179 MD RECERTIFICATION HHA PT: HCPCS | Performed by: NURSE PRACTITIONER

## 2025-02-10 NOTE — PROGRESS NOTES
Connie Greenwood is a 79 year old female seen January 27, 2025 at [facility] where she has resided for 3 months (admit 10/2024) seen to follow up memory loss and history of falls   Pt is seen in her apartment, up ambulating without device.   Reports she is feeling well, denies any pain or other symptoms.  Her arm is better, demonstrates good range of motion.   No CV or GI symptoms         By chart review, pt has had a progressive cognitive decline over the past couple of years.   She saw Neurology and underwent Neuropsychiatric testing in the fall of 2023    Testing showed significant deficits in verbal learning and memory, impaired delayed recall and moderate deficits in processing.   She was started on donepezil at that time, although not compliant with taking it when living on her own.      Pt had a [facility] hospitalization in September 2024 following an episode of LOC with a fall in which she suffered a scalp laceration, left side 7th and 8th rib fractures and comminuted and angulated distal left radius and ulna fractures    She discharged home, than had another [facility] hospitalization in October 2024 after being found down by her son.   Imaging showed a SDH with increased confusion and hallucinations during her stay.   Pt had another fall in November 2024 and was hospitalized at [facility] with an acute left surgical neck of the proximal humerus fracture and new left radial fracture.   She was managed non operatively and returned to [place]    Pt had been out walking alone and was found down outside by a passerby, got in the car with him (a stranger) to go to ED.      Past Medical History:   Diagnosis Date    Dementia (H)     Hyperlipidemia     Hypertension     Sleep apnea     Wears a mouth piece    Subdural hematoma (H)        Past Surgical History:   Procedure Laterality Date    CHOLECYSTECTOMY      DAVINCI PELVIC PROCEDURE N/A 3/31/2023    Procedure: Xi Robotic Assisted Laparoscopic Sacral Colpopexy, Robotic  "Laparoscopic Supracervical Hysterectomy with Bilateral Salpingectomy-Oopherectomy, Lysis of Adhesions,  Robotic Laparoscopic Abdominal Enterocele Repair, Cystoscopy, and Midurethral Sling;  Surgeon: Sheryl Patiño MD;  Location:  OR     :  L[place] alone in [facility]  Son Av   Daughter Connie  Four grandchildren  Sister and brother-in-law are visiting today.   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   Non smoker     ROS:  MMSE 25/30 in Aug 2023   MoCA 20/30 in Dec 2024   CPT 5.0      Wt Readings from Last 5 Encounters:   01/27/25 72.8 kg (160 lb 8 oz)   12/30/24 71.6 kg (157 lb 14.4 oz)   12/23/24 71.6 kg (157 lb 14.4 oz)   12/16/24 74.8 kg (165 lb)   11/25/24 72.1 kg (159 lb)      GENERAL APPEARANCE: alert and no distress  /69   Pulse 54   Temp (!) 95.1  F (35.1  C)   Resp 12   Ht 1.674 m (5' 5.9\")   Wt 72.8 kg (160 lb 8 oz)   SpO2 98%   BMI 25.98 kg/m     HEENT: normocephalic, no lesion or abnormalities  Mild kyphosis  NECK: no adenopathy, no asymmetry, masses, or scars and thyroid normal to palpation  RESP: lungs clear to auscultation - no rales, rhonchi or wheezes  CV: regular rate and rhythm, normal S1 S2  ABDOMEN:  soft, nontender, no HSM or masses and bowel sounds normal  MS: extremities with trace edema  SKIN:  Chronic skin changes of LEs, left shin with a healing scab.     NEURO: Normal strength and tone, sensory exam grossly normal, and speech normal  PSYCH: affect okay, pleasant and smiling  LYMPHATICS: No cervical,  or supraclavicular nodes     Lab Results   Component Value Date     10/07/2024    POTASSIUM 4.1 10/07/2024    CHLORIDE 108 (H) 10/07/2024    CO2 23 10/07/2024    ANIONGAP 9 10/07/2024    GLC 89 10/07/2024    BUN 14.8 10/07/2024    CR 0.64 10/07/2024    GFRESTIMATED 90 10/07/2024    ISAURO 8.5 (L) 10/07/2024     Lab Results   Component Value Date    AST 49 10/07/2024      ALBUMIN 3.3 10/07/2024      " ALKPHOS 94 10/07/2024      Lab Results   Component Value Date    WBC 6.5 10/07/2024      HGB 11.9 10/07/2024      MCV 94 10/07/2024       10/07/2024      CT head 11/19/2024.  No interval change in the mixed density left convexity subdural hematoma measuring up to 13 mm in thickness. Mild effacement of the underlying frontal lobe sulci. No significant midline shift.  No new intracranial hemorrhage. Similar mild diffuse parenchymal volume loss. The ventricles are unchanged in caliber. The gray-white matter interface is preserved. There is similar scattered hypoattenuation within the supratentorial white matter typical of chronic small vessel ischemic changes.  No calvarial fracture. Evidence of prior cataract surgery. The paranasal sinuses and mastoid air cells are clear.  IMPRESSION:  1. No interval change since 11/18/2024.  2. Mixed density left convexity subdural hematoma with mild effacement of the underlying frontal lobe sulci and no significant midline shift.  3. Similar mild diffuse parenchymal volume loss and presumed chronic small vessel ischemic changes.     Two views of the left clavicle 11/19/2025    Three piece fracture of the lateral left clavicle with mild inferior angulation of the lateral fragments with respect the larger medial fragment some callus formation noted. AC joint intact.     Left humerus x-ray 11/18/2025   Surgical neck fracture of the left proximal humerus without significant displacement, likely minimal impaction.   Displaced fracture of the distal left clavicle with the distal most fracture fragment displaced inferiorly by approximately 1 bone width. Additionally, there is acromioclavicular joint separation with the clavicle displaced superiorly by approximately 1.5 to 2.5 centimeters.      ECHO 9/2024   1. Normal biventricular size and function. Left ventricular ejection fraction of 60-65%.  2. No segmental wall motion abnormalities noted.  3. There is mild (1+) tricuspid  "regurgitation.  No prior study for comparison. Technically adequate study.      IMP/PLAN:   (R41.9) Neurocognitive disorder   (F03.B0) Moderate dementia without behavioral disturbance, psychotic disturbance, mood disturbance, or anxiety, unspecified dementia type (H)  Comment: progressive course consistent with an Alzheimer's dementia   Pt lacks awareness of her functional decline, today reports she's \"going to get her car back\"     Plan: [place] support for med admin, meals, activity        (S06.5XAA) Subdural hematoma (H)  Comment: as above   Plan: following with Neurosurgery     (S52.502D) Closed fracture of distal end of left radius with routine healing, unspecified fracture morphology, subsequent encounter  (S42.002D) Closed nondisplaced fracture of left clavicle with routine healing, unspecified part of clavicle, subsequent encounter  (S42.215D) Closed nondisplaced fracture of surgical neck of left humerus with routine healing, unspecified fracture morphology, subsequent encounter  Comment: multiple falls    Plan: acetaminophen 1000 mg bid and PRN for pain     Will discontinue oxycodone as pt is not using that      Follow up with Orthopedics as scheduled     (R29.6) Frequent falls  Comment: poor safety awareness, deconditioning  Plan: vit D 25 mcg/day, calcium supplement for bone health      [facility] and Occupational Therapy for balance, gait, strengthening     (G47.33) KING (obstructive sleep apnea)  Comment: not using her CPAP   Plan: follow up with Sleep Clinic PRN     (I10) Primary hypertension  Comment:   BP Readings from Last 3 Encounters:   01/27/25 100/69   12/30/24 135/56   12/23/24 120/70      Plan: amlodipine 2.5 mg/day   Follow bps    (E78.5) Hyperlipidemia, unspecified hyperlipidemia type  Comment: no h/o CV event  Plan: simvastatin 20 mg/day for primary prevention      Dulce Yu MD   "

## 2025-03-03 DIAGNOSIS — H40.003 GLAUCOMA SUSPECT, BILATERAL: Primary | ICD-10-CM

## 2025-03-03 RX ORDER — LATANOPROST 50 UG/ML
SOLUTION/ DROPS OPHTHALMIC
Qty: 2.1 ML | Refills: 11 | Status: SHIPPED | OUTPATIENT
Start: 2025-03-03

## 2025-03-11 ENCOUNTER — HOSPITAL ENCOUNTER (OUTPATIENT)
Dept: MAMMOGRAPHY | Facility: CLINIC | Age: 80
Discharge: HOME | End: 2025-03-11
Attending: NURSE PRACTITIONER | Admitting: NURSE PRACTITIONER
Payer: COMMERCIAL

## 2025-03-11 DIAGNOSIS — Z12.31 VISIT FOR SCREENING MAMMOGRAM: ICD-10-CM

## 2025-03-11 PROCEDURE — 77063 BREAST TOMOSYNTHESIS BI: CPT

## 2025-03-19 DIAGNOSIS — E78.5 HYPERLIPIDEMIA, UNSPECIFIED HYPERLIPIDEMIA TYPE: Primary | ICD-10-CM

## 2025-03-19 RX ORDER — SIMVASTATIN 20 MG
TABLET ORAL
Qty: 30 TABLET | Refills: 11 | Status: SHIPPED | OUTPATIENT
Start: 2025-03-19

## 2025-04-02 ENCOUNTER — TRANSFERRED RECORDS (OUTPATIENT)
Dept: HEALTH INFORMATION MANAGEMENT | Facility: CLINIC | Age: 80
End: 2025-04-02
Payer: COMMERCIAL

## 2025-06-02 ENCOUNTER — ASSISTED LIVING VISIT (OUTPATIENT)
Dept: GERIATRICS | Facility: CLINIC | Age: 80
End: 2025-06-02
Payer: COMMERCIAL

## 2025-06-02 VITALS
WEIGHT: 104 LBS | OXYGEN SATURATION: 99 % | BODY MASS INDEX: 16.71 KG/M2 | RESPIRATION RATE: 17 BRPM | HEIGHT: 66 IN | SYSTOLIC BLOOD PRESSURE: 145 MMHG | TEMPERATURE: 97.5 F | DIASTOLIC BLOOD PRESSURE: 59 MMHG | HEART RATE: 90 BPM

## 2025-06-02 DIAGNOSIS — Z00.00 ENCOUNTER FOR MEDICARE ANNUAL WELLNESS EXAM: ICD-10-CM

## 2025-06-02 DIAGNOSIS — M85.89 OSTEOPENIA OF MULTIPLE SITES: ICD-10-CM

## 2025-06-02 DIAGNOSIS — R41.9 NEUROCOGNITIVE DISORDER: ICD-10-CM

## 2025-06-02 DIAGNOSIS — I10 PRIMARY HYPERTENSION: ICD-10-CM

## 2025-06-02 DIAGNOSIS — F03.B0 MODERATE DEMENTIA WITHOUT BEHAVIORAL DISTURBANCE, PSYCHOTIC DISTURBANCE, MOOD DISTURBANCE, OR ANXIETY, UNSPECIFIED DEMENTIA TYPE (H): Primary | ICD-10-CM

## 2025-06-02 NOTE — LETTER
6/2/2025      Connie Greenwood  C/o Haleigh Johnson  [address] [place] [address]        No notes on file      Sincerely,        Yasmin Dong NP    Electronically signed

## 2025-06-02 NOTE — PROGRESS NOTES
"[facility] GERIATRICS    Chief Complaint   Patient presents with    Wellness Visit     HPI:  Connie Greenwood is a 79 year old  (1945), who is being [place] today [place] [facility] [id number] Today's concern is: ***    Annual Wellness Visit   {Split Bill scripting  The purpose of this visit is to discuss your medical history and prevent health problems before you are sick. You may be responsible for a co-pay, coinsurance, or deductible if your visit today includes services such as checking on a sore throat, having an x-ray or lab test, or treating and evaluating a new or existing condition :963276}  Patient has been advised of split billing requirements and indicates understanding: {Yes and No:479088}   {Provider  Link to SmartSet :214308}      Health Care Directive  Patient does not have a Health Care Directive: {ADVANCE_DIRECTIVE_STATUS:534810}  In general, how would you rate your overall physical health? {Physical Health:940474}  Do you have a special diet?  {Diet:723040}  { Click here to complete exercise, social connection and stress questions After questions have been completed, refresh note to pull answers into note  :688663}       No data to display              Do you see a dentist two times every year?  {Dentist:765313}  Have you been more tired than usual lately?  {Energy level:532295}  If you drink alcohol do you typically have >3 drinks per day or >7 drinks per week? { :003883}  Do you have a current opioid prescription? { :350053}  Do you use any other controlled substances or medications that are not prescribed by a provider? {Substance Use :722063::\"None\"}  Social History     Tobacco Use    Smoking status: Never    Smokeless tobacco: Never   Vaping Use    Vaping status: Never Used   Substance Use Topics    Alcohol use: Not Currently     Comment: occ    Drug use: Never     {Provider  If there are gaps in the social history shown above, " "please follow the link to update and then refresh the note Link to Social and Substance History :957066}  Needs assistance for the following daily activities: { :589213}  Which of the following safety concerns are present in your home?  { :889978::\"none identified\"}   Do you (or your family members) have any concerns about your safety while driving?  {yes/no:346353}  Do you have any of the following hearing concerns?: { :138294}  In the past 6 months, have you been bothered by leaking of urine? {yes/no:796229}     {Fall Risk REQUIRED for AWV, if date of completion is not today  Click here for Fall Risk flowsheet then refresh note to pull results :013377}       No data to display                 {Rooming Staff Patient needs a PHQ as part of the AWV.  Use this link to complete and then refresh the note to pull results Link to PHQ2 Assessment :665726}  {USE TO PULL IN PHQ RESULTS FOR TODAY:202480}      3/11/2025   LAST FHS-7 RESULTS   1st degree relative breast or ovarian cancer No   Any relative bilateral breast cancer No   Any male have breast cancer No   Any ONE woman have BOTH breast AND ovarian cancer No   Any woman with breast cancer before 50yrs No   2 or more relatives with breast AND/OR ovarian cancer No   2 or more relatives with breast AND/OR bowel cancer No     {If any of the questions to the FHS7 are answered yes, consider referral for genetic counseling.    Additional indications for genetic referral include personal history of breast or ovarian cancer, genetic mutation in 1st degree relative which increases risk of breast cancer including BRCA1, BRCA2, NORMA, PALB 2, TP53, CHEK2, PTEN, CDH1, STK11 (per ACS) and/or 1st degree relative with history of pancreatic or high-risk prostate cancer (per NCCN):492865}   {Mammogram Decision Support (Optional):282079}    ASCVD Risk   The ASCVD Risk score (Dedra MONCADA, et al., 2019) failed to calculate for the following reasons:    Cannot find a previous HDL " lab    Cannot find a previous total cholesterol lab    {Link to Fracture Risk Assessment Tool (Optional):913066}    {Provider  REQUIRED FOR AWV Use the storyboard to review patient history, after sections have been marked as reviewed, refresh note to capture documentation:508941}  {Provider   REQUIRED AWV use this link to review and update sexual activity history  after section has been marked as reviewed, refresh note to capture documentation:703177}  Reviewed and updated as needed this visit by Provider                    {HISTORY OPTIONS (Optional):340962}    Current providers sharing in care for this patient include:  Patient Care Team:  Yasmin Dong NP as PCP - General (Geriatric Medicine)  Dulce Yu MD as MD (Internal Medicine)  [facility] as Case Management Specialist  Hero Good APRN CNP as Assigned PCP  Hero Good APRN CNP as Assigned Pain Medication Provider  Xiomara Valentin, RN as Lead Care Coordinator (Primary Care - CC)  Augustina Wood MA as Geriatric Services     The following health maintenance items are reviewed in Epic and correct as of today:  Health Maintenance   Topic Date Due    LIPID  Never done    FALL RISK ASSESSMENT  Never done    COVID-19 VACCINE (7 - 2024-25 season) 09/01/2024    PHQ-2 (once per calendar year)  Never done    MEDICARE ANNUAL WELLNESS VISIT  07/24/2025    INFLUENZA VACCINE (Season Ended) 09/01/2025    BMP  10/07/2025    DIABETES SCREENING  10/07/2027    ADVANCE CARE PLANNING  11/25/2029    DTAP/TDAP/TD VACCINE (2 - Td or Tdap) 09/15/2034    DEXA  09/19/2034    HEPATITIS C SCREENING  Completed    PNEUMOCOCCAL VACCINE 50+ YEARS  Completed    ZOSTER VACCINE  Completed    RSV VACCINE  Completed    HPV VACCINE  Aged Out    MENINGITIS VACCINE  Aged Out       Appropriate preventive services were discussed with this patient, including applicable screening as appropriate for fall  "prevention, nutrition, physical activity, Tobacco-use cessation, weight loss and cognition.  Checklist reviewing preventive services available has been given to the patient.      {Provider  The Mini-Cog is incomplete, use link to complete and refresh note Link to Mini-Cog :704519}       No data to display              {A Mini-Cog total score of 0-2 suggests the possibility of dementia, score of 3-5 suggests no dementia:311167}           Allergies, and PMH/PSH reviewed in EPIC today.  REVIEW OF SYSTEMS:  {nzhess87:541628}    Objective:   BP (!) 145/59   Pulse 90   Temp 97.5  F (36.4  C)   Resp 17   Ht 1.674 m (5' 5.9\")   Wt 47.2 kg (104 lb)   SpO2 99%   BMI 16.84 kg/m    {Nursing home physical exam :973308}    {fgslab:207521}    Assessment/Plan:  {FGS DX2:605575}    MED REC REQUIRED{TIP  Click the link below to document or use med rec list, use list to pull in response :231587}  Post Medication Reconciliation Status: {MED REC LIST:543918}      Orders:  {fgsorders:805959}  ***    Electronically signed by: Augustina Wood MA ***     " "including applicable screening as appropriate for fall prevention, nutrition, physical activity, Tobacco-use cessation, weight loss and cognition.  Checklist reviewing preventive services available has been given to the patient.             No data to display                         Allergies, and PMH/PSH reviewed in EPIC today.  REVIEW OF SYSTEMS:  10 point ROS of systems including Constitutional, Eyes, Respiratory, Cardiovascular, Gastroenterology, Genitourinary, Integumentary, Musculoskeletal, Psychiatric were all negative except for pertinent positives noted in my HPI.    Objective:   BP (!) 145/59   Pulse 90   Temp 97.5  F (36.4  C)   Resp 17   Ht 1.674 m (5' 5.9\")   Wt 47.2 kg (104 lb)   SpO2 99%   BMI 16.84 kg/m    A & O x 1-2, NAD. Lungs CTA, non labored. RRR, S1/S2 w/o murmur,gallop or rub.  edema.  Abdomen soft, nontender, +BT'S. No focal neurological deficits.        Recent labs in [facility] reviewed by me today.     Assessment/Plan:     Moderate dementia without behavioral disturbance, psychotic disturbance, mood disturbance, or anxiety, unspecified dementia type (H)  Neurocognitive disorder  Osteopenia of multiple sites  Primary hypertension  Continue with plan of care no changes at this time, adjustment as needed  The current medical regimen is effective; continue present plan and medications.  --Will follow up in 60 days and for acute concerns.     MED REC REQUIRED  Post Medication Reconciliation Status: patient was not discharged from an inpatient facility or TCU      Orders:  The current medical regimen is effective; continue present plan and medications.      Electronically signed by: Yasmin Dong NP       "

## 2025-06-24 ENCOUNTER — PATIENT OUTREACH (OUTPATIENT)
Dept: CARE COORDINATION | Facility: CLINIC | Age: 80
End: 2025-06-24
Payer: COMMERCIAL

## 2025-07-08 ENCOUNTER — PATIENT OUTREACH (OUTPATIENT)
Dept: CARE COORDINATION | Facility: CLINIC | Age: 80
End: 2025-07-08
Payer: COMMERCIAL

## 2025-07-10 ASSESSMENT — ACTIVITIES OF DAILY LIVING (ADL): CURRENT_FUNCTION: NEEDS ASSISTANCE

## 2025-07-11 NOTE — PATIENT INSTRUCTIONS
Patient Education   Preventive Care Advice   This is general advice given by our system to help you stay healthy. However, your care team may have specific advice just for you. Please talk to your care team about your preventive care needs.  Nutrition  Eat 5 or more servings of fruits and vegetables each day.  Try wheat bread, brown rice and whole grain pasta (instead of white bread, rice, and pasta).  Get enough calcium and vitamin D. Check the label on foods and aim for 100% of the RDA (recommended daily allowance).  Lifestyle  Exercise at least 150 minutes each week  (30 minutes a day, 5 days a week).  Do muscle strengthening activities 2 days a week. These help control your weight and prevent disease.  No smoking.  Wear sunscreen to prevent skin cancer.  Have a dental exam and cleaning every 6 months.  Yearly exams  See your health care team every year to talk about:  Any changes in your health.  Any medicines your care team has prescribed.  Preventive care, family planning, and ways to prevent chronic diseases.  Shots (vaccines)   HPV shots (up to age 26), if you've never had them before.  Hepatitis B shots (up to age 59), if you've never had them before.  COVID-19 shot: Get this shot when it's due.  Flu shot: Get a flu shot every year.  Tetanus shot: Get a tetanus shot every 10 years.  Pneumococcal, hepatitis A, and RSV shots: Ask your care team if you need these based on your risk.  Shingles shot (for age 50 and up)  General health tests  Diabetes screening:  Starting at age 35, Get screened for diabetes at least every 3 years.  If you are younger than age 35, ask your care team if you should be screened for diabetes.  Cholesterol test: At age 39, start having a cholesterol test every 5 years, or more often if advised.  Bone density scan (DEXA): At age 50, ask your care team if you should have this scan for osteoporosis (brittle bones).  Hepatitis C: Get tested at least once in your life.  STIs (sexually  transmitted infections)  Before age 24: Ask your care team if you should be screened for STIs.  After age 24: Get screened for STIs if you're at risk. You are at risk for STIs (including HIV) if:  You are sexually active with more than one person.  You don't use condoms every time.  You or a partner was diagnosed with a sexually transmitted infection.  If you are at risk for HIV, ask about PrEP medicine to prevent HIV.  Get tested for HIV at least once in your life, whether you are at risk for HIV or not.  Cancer screening tests  Cervical cancer screening: If you have a cervix, begin getting regular cervical cancer screening tests starting at age 21.  Breast cancer scan (mammogram): If you've ever had breasts, begin having regular mammograms starting at age 40. This is a scan to check for breast cancer.  Colon cancer screening: It is important to start screening for colon cancer at age 45.  Have a colonoscopy test every 10 years (or more often if you're at risk) Or, ask your provider about stool tests like a FIT test every year or Cologuard test every 3 years.  To learn more about your testing options, visit:   .  For help making a decision, visit:   https://bit.ly/ax51545.  Prostate cancer screening test: If you have a prostate, ask your care team if a prostate cancer screening test (PSA) at age 55 is right for you.  Lung cancer screening: If you are a current or former smoker ages 50 to 80, ask your care team if ongoing lung cancer screenings are right for you.  For informational purposes only. Not to replace the advice of your health care provider. Copyright   2023 [employer]. All rights reserved. Clinically reviewed by the [facility]. REPP 629702 - REV 01/24.

## 2025-08-18 ENCOUNTER — ASSISTED LIVING VISIT (OUTPATIENT)
Dept: GERIATRICS | Facility: CLINIC | Age: 80
End: 2025-08-18
Payer: COMMERCIAL

## 2025-08-18 VITALS
HEART RATE: 59 BPM | HEIGHT: 66 IN | SYSTOLIC BLOOD PRESSURE: 125 MMHG | BODY MASS INDEX: 25.91 KG/M2 | DIASTOLIC BLOOD PRESSURE: 65 MMHG | OXYGEN SATURATION: 96 % | RESPIRATION RATE: 19 BRPM | WEIGHT: 161.2 LBS | TEMPERATURE: 98.2 F

## 2025-08-18 DIAGNOSIS — G47.33 OSA (OBSTRUCTIVE SLEEP APNEA): ICD-10-CM

## 2025-08-18 DIAGNOSIS — M85.89 OSTEOPENIA OF MULTIPLE SITES: ICD-10-CM

## 2025-08-18 DIAGNOSIS — E78.5 HYPERLIPIDEMIA, UNSPECIFIED HYPERLIPIDEMIA TYPE: ICD-10-CM

## 2025-08-18 DIAGNOSIS — Z00.00 ENCOUNTER FOR MEDICARE ANNUAL WELLNESS EXAM: ICD-10-CM

## 2025-08-18 DIAGNOSIS — I10 PRIMARY HYPERTENSION: ICD-10-CM

## 2025-08-18 DIAGNOSIS — R41.9 NEUROCOGNITIVE DISORDER: ICD-10-CM

## 2025-08-18 DIAGNOSIS — F03.B0 MODERATE DEMENTIA WITHOUT BEHAVIORAL DISTURBANCE, PSYCHOTIC DISTURBANCE, MOOD DISTURBANCE, OR ANXIETY, UNSPECIFIED DEMENTIA TYPE (H): Primary | ICD-10-CM

## 2025-08-18 PROCEDURE — 99397 PER PM REEVAL EST PAT 65+ YR: CPT | Performed by: NURSE PRACTITIONER

## 2025-08-18 ASSESSMENT — ACTIVITIES OF DAILY LIVING (ADL): CURRENT_FUNCTION: NEEDS ASSISTANCE

## (undated) DEVICE — DAVINCI CONMED AIRSEAL CAP & OBTURATOR BLADELESS 8MM IAS8-DV

## (undated) DEVICE — DAVINCI HOT SHEARS TIP COVER  400180

## (undated) DEVICE — ENDO ACCESS PLATFORM GELPOINT SGL INCISION CNGL2

## (undated) DEVICE — SPONGE PACK VAGINAL 2"X9

## (undated) DEVICE — SU VICRYL 2-0 CT-2 27" J333H

## (undated) DEVICE — DAVINCI OBTURATOR 8MM BLADELESS 420023

## (undated) DEVICE — SU PDS II 2-0 CT-2 27"  Z333H

## (undated) DEVICE — LINEN ORTHO ACL PACK 5447

## (undated) DEVICE — DAVINCI XI DRAPE COLUMN 470341

## (undated) DEVICE — SUCTION MANIFOLD NEPTUNE 2 SYS 4 PORT 0702-020-000

## (undated) DEVICE — PREP CHLORHEXIDINE 4% 4OZ (HIBICLENS) 57504

## (undated) DEVICE — SPONGE RAY-TEC 3X3" 30-094

## (undated) DEVICE — SU VICRYL 0 CT-2 27" J334H

## (undated) DEVICE — SU VICRYL 4-0 PS-2 18" UND J496H

## (undated) DEVICE — SOL NACL 0.9% INJ 1000ML BAG 2B1324X

## (undated) DEVICE — GLOVE BIOGEL PI MICRO INDICATOR UNDERGLOVE SZ 6.5 48965

## (undated) DEVICE — PAD PERI INDIV WRAP 11" 2022A

## (undated) DEVICE — TUBING SUCTION 12"X1/4" N612

## (undated) DEVICE — TUBING IRRIG TUR Y TYPE 96" LF 6543-01

## (undated) DEVICE — SUCTION IRR STRYKERFLOW II W/TIP 250-070-520

## (undated) DEVICE — SYSTEM LAPAROVUE VISIBILITY LAPVUE10

## (undated) DEVICE — DRAPE VAGI BAG 18X9" 1072

## (undated) DEVICE — PROTECTOR ARM ONE-STEP TRENDELENBURG 40418

## (undated) DEVICE — NDL INSUFFLATION 13GA 120MM C2201

## (undated) DEVICE — SU MONOCRYL 0 CT-2 27" Y334H

## (undated) DEVICE — SPONGE RAY-TEC 4X8" 7318

## (undated) DEVICE — SU VICRYL 0 CT-1 27" J340H

## (undated) DEVICE — ESU GROUND PAD ADULT W/CORD E7507

## (undated) DEVICE — SOL WATER IRRIG 3000ML BAG 2B7117

## (undated) DEVICE — DRAPE UNDER BUTTOCK 89415

## (undated) DEVICE — DAVINCI XI OBTURATOR BLADELESS 8MM 470359

## (undated) DEVICE — DAVINCI CONMED AIRSEAL BIFURCATRED TUBE SET ASM-EVAC1-BI

## (undated) DEVICE — GLOVE BIOGEL PI MICRO INDICATOR UNDERGLOVE SZ 6.0 48960

## (undated) DEVICE — DAVINCI XI SEAL UNIVERSAL 5-8MM 470361

## (undated) DEVICE — SUCTION TIP YANKAUER W/O VENT K86

## (undated) DEVICE — PACK DAVINCI UROLOGY SBA15UDFSG

## (undated) DEVICE — CATH FOLEY 16FR 5ML LUBRICATH LATEX 0165L16

## (undated) DEVICE — ENDO POUCH UNIV RETRIEVAL SYSTEM INZII 10MM CD001

## (undated) DEVICE — DAVINCI XI DRAPE ARM 470015

## (undated) DEVICE — DRAPE MAYO STAND 23X54 8337

## (undated) DEVICE — ADH SKIN CLOSURE PREMIERPRO EXOFIN MICOR HV 0.5ML 3471

## (undated) DEVICE — KIT PATIENT POSITIONING PIGAZZI LATEX FREE 40580

## (undated) DEVICE — Device

## (undated) RX ORDER — BUPIVACAINE HYDROCHLORIDE 5 MG/ML
INJECTION, SOLUTION EPIDURAL; INTRACAUDAL
Status: DISPENSED
Start: 2023-03-31

## (undated) RX ORDER — FENTANYL CITRATE-0.9 % NACL/PF 10 MCG/ML
PLASTIC BAG, INJECTION (ML) INTRAVENOUS
Status: DISPENSED
Start: 2023-03-31

## (undated) RX ORDER — DEXAMETHASONE SODIUM PHOSPHATE 4 MG/ML
INJECTION, SOLUTION INTRA-ARTICULAR; INTRALESIONAL; INTRAMUSCULAR; INTRAVENOUS; SOFT TISSUE
Status: DISPENSED
Start: 2023-03-31

## (undated) RX ORDER — LIDOCAINE HYDROCHLORIDE 10 MG/ML
INJECTION, SOLUTION EPIDURAL; INFILTRATION; INTRACAUDAL; PERINEURAL
Status: DISPENSED
Start: 2023-03-31

## (undated) RX ORDER — CEFAZOLIN SODIUM/WATER 2 G/20 ML
SYRINGE (ML) INTRAVENOUS
Status: DISPENSED
Start: 2023-03-31

## (undated) RX ORDER — PROPOFOL 10 MG/ML
INJECTION, EMULSION INTRAVENOUS
Status: DISPENSED
Start: 2023-03-31

## (undated) RX ORDER — FENTANYL CITRATE 50 UG/ML
INJECTION, SOLUTION INTRAMUSCULAR; INTRAVENOUS
Status: DISPENSED
Start: 2023-03-31

## (undated) RX ORDER — ACETAMINOPHEN 325 MG/1
TABLET ORAL
Status: DISPENSED
Start: 2023-03-31

## (undated) RX ORDER — ONDANSETRON 2 MG/ML
INJECTION INTRAMUSCULAR; INTRAVENOUS
Status: DISPENSED
Start: 2023-03-31

## (undated) RX ORDER — LIDOCAINE HYDROCHLORIDE AND EPINEPHRINE 10; 10 MG/ML; UG/ML
INJECTION, SOLUTION INFILTRATION; PERINEURAL
Status: DISPENSED
Start: 2023-03-31

## (undated) RX ORDER — PHENAZOPYRIDINE HYDROCHLORIDE 200 MG/1
TABLET, FILM COATED ORAL
Status: DISPENSED
Start: 2023-03-31